# Patient Record
Sex: FEMALE | Race: WHITE | Employment: OTHER | URBAN - NONMETROPOLITAN AREA
[De-identification: names, ages, dates, MRNs, and addresses within clinical notes are randomized per-mention and may not be internally consistent; named-entity substitution may affect disease eponyms.]

---

## 2019-01-30 ENCOUNTER — OFFICE VISIT (OUTPATIENT)
Dept: CARDIOLOGY | Age: 74
End: 2019-01-30
Payer: MEDICARE

## 2019-01-30 VITALS
DIASTOLIC BLOOD PRESSURE: 64 MMHG | WEIGHT: 80 LBS | SYSTOLIC BLOOD PRESSURE: 132 MMHG | HEART RATE: 78 BPM | BODY MASS INDEX: 13.66 KG/M2 | HEIGHT: 64 IN

## 2019-01-30 DIAGNOSIS — R06.09 DOE (DYSPNEA ON EXERTION): ICD-10-CM

## 2019-01-30 DIAGNOSIS — R07.9 CHEST PAIN, UNSPECIFIED TYPE: Primary | ICD-10-CM

## 2019-01-30 DIAGNOSIS — R00.2 PALPITATIONS: ICD-10-CM

## 2019-01-30 DIAGNOSIS — I10 ESSENTIAL HYPERTENSION: ICD-10-CM

## 2019-01-30 DIAGNOSIS — Z98.890 HX OF MITRAL VALVE REPAIR: ICD-10-CM

## 2019-01-30 PROCEDURE — 3017F COLORECTAL CA SCREEN DOC REV: CPT | Performed by: NURSE PRACTITIONER

## 2019-01-30 PROCEDURE — 0296T PR EXT ECG > 48HR TO 21 DAY RCRD W/CONECT INTL RCRD: CPT | Performed by: NURSE PRACTITIONER

## 2019-01-30 PROCEDURE — 99214 OFFICE O/P EST MOD 30 MIN: CPT | Performed by: NURSE PRACTITIONER

## 2019-01-30 PROCEDURE — 4040F PNEUMOC VAC/ADMIN/RCVD: CPT | Performed by: NURSE PRACTITIONER

## 2019-01-30 PROCEDURE — G8484 FLU IMMUNIZE NO ADMIN: HCPCS | Performed by: NURSE PRACTITIONER

## 2019-01-30 PROCEDURE — G8427 DOCREV CUR MEDS BY ELIG CLIN: HCPCS | Performed by: NURSE PRACTITIONER

## 2019-01-30 PROCEDURE — G8400 PT W/DXA NO RESULTS DOC: HCPCS | Performed by: NURSE PRACTITIONER

## 2019-01-30 PROCEDURE — 1036F TOBACCO NON-USER: CPT | Performed by: NURSE PRACTITIONER

## 2019-01-30 PROCEDURE — 1090F PRES/ABSN URINE INCON ASSESS: CPT | Performed by: NURSE PRACTITIONER

## 2019-01-30 PROCEDURE — 1101F PT FALLS ASSESS-DOCD LE1/YR: CPT | Performed by: NURSE PRACTITIONER

## 2019-01-30 PROCEDURE — 1123F ACP DISCUSS/DSCN MKR DOCD: CPT | Performed by: NURSE PRACTITIONER

## 2019-01-30 PROCEDURE — G8419 CALC BMI OUT NRM PARAM NOF/U: HCPCS | Performed by: NURSE PRACTITIONER

## 2019-02-13 ENCOUNTER — HOSPITAL ENCOUNTER (OUTPATIENT)
Dept: NON INVASIVE DIAGNOSTICS | Age: 74
Discharge: HOME | End: 2019-02-13
Payer: MEDICARE

## 2019-02-13 VITALS
HEART RATE: 89 BPM | BODY MASS INDEX: 13.77 KG/M2 | WEIGHT: 80.25 LBS | SYSTOLIC BLOOD PRESSURE: 134 MMHG | DIASTOLIC BLOOD PRESSURE: 80 MMHG

## 2019-02-13 DIAGNOSIS — R06.09 DOE (DYSPNEA ON EXERTION): ICD-10-CM

## 2019-02-13 DIAGNOSIS — Z98.890 HX OF MITRAL VALVE REPAIR: ICD-10-CM

## 2019-02-13 DIAGNOSIS — R07.9 CHEST PAIN, UNSPECIFIED TYPE: ICD-10-CM

## 2019-02-13 LAB
LV EF: 60 %
LV EF: 60 %
LVEF MODALITY: NORMAL
LVEF MODALITY: NORMAL

## 2019-02-13 PROCEDURE — 6360000002 HC RX W HCPCS: Performed by: INTERNAL MEDICINE

## 2019-02-13 PROCEDURE — C8928 TTE W OR W/O FOL W/CON,STRES: HCPCS

## 2019-02-13 PROCEDURE — 93306 TTE W/DOPPLER COMPLETE: CPT

## 2019-02-13 PROCEDURE — 2580000003 HC RX 258: Performed by: INTERNAL MEDICINE

## 2019-02-13 PROCEDURE — 6360000004 HC RX CONTRAST MEDICATION: Performed by: INTERNAL MEDICINE

## 2019-02-13 RX ORDER — DOBUTAMINE HYDROCHLORIDE 200 MG/100ML
10 INJECTION INTRAVENOUS CONTINUOUS PRN
Status: ACTIVE | OUTPATIENT
Start: 2019-02-13 | End: 2019-02-14

## 2019-02-13 RX ORDER — SODIUM CHLORIDE 9 MG/ML
INJECTION, SOLUTION INTRAVENOUS
Status: COMPLETED | OUTPATIENT
Start: 2019-02-13 | End: 2019-02-13

## 2019-02-13 RX ADMIN — SODIUM CHLORIDE: 9 INJECTION, SOLUTION INTRAVENOUS at 10:00

## 2019-02-13 RX ADMIN — PERFLUTREN 1.65 MG: 6.52 INJECTION, SUSPENSION INTRAVENOUS at 10:00

## 2019-02-13 RX ADMIN — DOBUTAMINE HYDROCHLORIDE 10 MCG/KG/MIN: 200 INJECTION INTRAVENOUS at 10:00

## 2019-02-18 ENCOUNTER — TELEPHONE (OUTPATIENT)
Dept: CARDIOLOGY | Age: 74
End: 2019-02-18

## 2019-03-06 ENCOUNTER — OFFICE VISIT (OUTPATIENT)
Dept: CARDIOLOGY | Age: 74
End: 2019-03-06
Payer: MEDICARE

## 2019-03-06 VITALS
BODY MASS INDEX: 13.66 KG/M2 | HEIGHT: 64 IN | SYSTOLIC BLOOD PRESSURE: 162 MMHG | DIASTOLIC BLOOD PRESSURE: 82 MMHG | HEART RATE: 72 BPM | WEIGHT: 80 LBS

## 2019-03-06 DIAGNOSIS — I47.1 SVT (SUPRAVENTRICULAR TACHYCARDIA) (HCC): ICD-10-CM

## 2019-03-06 DIAGNOSIS — Z98.890 S/P MVR (MITRAL VALVE REPAIR): ICD-10-CM

## 2019-03-06 DIAGNOSIS — I34.1 MITRAL VALVE PROLAPSE: Primary | ICD-10-CM

## 2019-03-06 DIAGNOSIS — I10 ESSENTIAL HYPERTENSION: ICD-10-CM

## 2019-03-06 PROCEDURE — 4040F PNEUMOC VAC/ADMIN/RCVD: CPT | Performed by: NURSE PRACTITIONER

## 2019-03-06 PROCEDURE — 93000 ELECTROCARDIOGRAM COMPLETE: CPT | Performed by: NURSE PRACTITIONER

## 2019-03-06 PROCEDURE — G8427 DOCREV CUR MEDS BY ELIG CLIN: HCPCS | Performed by: NURSE PRACTITIONER

## 2019-03-06 PROCEDURE — 99213 OFFICE O/P EST LOW 20 MIN: CPT | Performed by: NURSE PRACTITIONER

## 2019-03-06 PROCEDURE — 1123F ACP DISCUSS/DSCN MKR DOCD: CPT | Performed by: NURSE PRACTITIONER

## 2019-03-06 PROCEDURE — 1101F PT FALLS ASSESS-DOCD LE1/YR: CPT | Performed by: NURSE PRACTITIONER

## 2019-03-06 PROCEDURE — 1090F PRES/ABSN URINE INCON ASSESS: CPT | Performed by: NURSE PRACTITIONER

## 2019-03-06 PROCEDURE — 1036F TOBACCO NON-USER: CPT | Performed by: NURSE PRACTITIONER

## 2019-03-06 PROCEDURE — G8484 FLU IMMUNIZE NO ADMIN: HCPCS | Performed by: NURSE PRACTITIONER

## 2019-03-06 PROCEDURE — G8419 CALC BMI OUT NRM PARAM NOF/U: HCPCS | Performed by: NURSE PRACTITIONER

## 2019-03-06 PROCEDURE — G8400 PT W/DXA NO RESULTS DOC: HCPCS | Performed by: NURSE PRACTITIONER

## 2019-03-06 PROCEDURE — 3017F COLORECTAL CA SCREEN DOC REV: CPT | Performed by: NURSE PRACTITIONER

## 2019-03-06 RX ORDER — LEVOTHYROXINE SODIUM 0.05 MG/1
50 TABLET ORAL DAILY
COMMUNITY
End: 2020-09-16

## 2019-03-26 SDOH — HEALTH STABILITY: MENTAL HEALTH: HOW OFTEN DO YOU HAVE A DRINK CONTAINING ALCOHOL?: NEVER

## 2019-03-29 ENCOUNTER — HOSPITAL ENCOUNTER (OUTPATIENT)
Age: 74
Setting detail: OUTPATIENT SURGERY
Discharge: HOME | End: 2019-03-29
Attending: ORTHOPAEDIC SURGERY | Admitting: ORTHOPAEDIC SURGERY
Payer: MEDICARE

## 2019-03-29 ENCOUNTER — ANESTHESIA (OUTPATIENT)
Dept: OPERATING ROOM | Age: 74
End: 2019-03-29
Payer: MEDICARE

## 2019-03-29 ENCOUNTER — ANESTHESIA EVENT (OUTPATIENT)
Dept: OPERATING ROOM | Age: 74
End: 2019-03-29
Payer: MEDICARE

## 2019-03-29 VITALS
RESPIRATION RATE: 23 BRPM | DIASTOLIC BLOOD PRESSURE: 51 MMHG | SYSTOLIC BLOOD PRESSURE: 121 MMHG | OXYGEN SATURATION: 100 % | TEMPERATURE: 98 F

## 2019-03-29 VITALS
TEMPERATURE: 97.1 F | HEART RATE: 58 BPM | SYSTOLIC BLOOD PRESSURE: 148 MMHG | WEIGHT: 80 LBS | BODY MASS INDEX: 13.66 KG/M2 | OXYGEN SATURATION: 96 % | DIASTOLIC BLOOD PRESSURE: 79 MMHG | HEIGHT: 64 IN | RESPIRATION RATE: 18 BRPM

## 2019-03-29 DIAGNOSIS — S63.439D TRAUMATIC RUPTURE OF VOLAR PLATE OF FINGER, SUBSEQUENT ENCOUNTER: Primary | ICD-10-CM

## 2019-03-29 PROBLEM — S63.439A: Status: ACTIVE | Noted: 2019-03-29

## 2019-03-29 PROCEDURE — 6360000002 HC RX W HCPCS: Performed by: NURSE ANESTHETIST, CERTIFIED REGISTERED

## 2019-03-29 PROCEDURE — 2580000003 HC RX 258: Performed by: NURSE ANESTHETIST, CERTIFIED REGISTERED

## 2019-03-29 PROCEDURE — 7100000010 HC PHASE II RECOVERY - FIRST 15 MIN: Performed by: ORTHOPAEDIC SURGERY

## 2019-03-29 PROCEDURE — 3700000000 HC ANESTHESIA ATTENDED CARE: Performed by: ORTHOPAEDIC SURGERY

## 2019-03-29 PROCEDURE — 7100000000 HC PACU RECOVERY - FIRST 15 MIN: Performed by: ORTHOPAEDIC SURGERY

## 2019-03-29 PROCEDURE — 6360000002 HC RX W HCPCS: Performed by: ORTHOPAEDIC SURGERY

## 2019-03-29 PROCEDURE — 2500000003 HC RX 250 WO HCPCS: Performed by: ORTHOPAEDIC SURGERY

## 2019-03-29 PROCEDURE — 7100000001 HC PACU RECOVERY - ADDTL 15 MIN: Performed by: ORTHOPAEDIC SURGERY

## 2019-03-29 PROCEDURE — 2500000003 HC RX 250 WO HCPCS: Performed by: NURSE ANESTHETIST, CERTIFIED REGISTERED

## 2019-03-29 PROCEDURE — 7100000011 HC PHASE II RECOVERY - ADDTL 15 MIN: Performed by: ORTHOPAEDIC SURGERY

## 2019-03-29 PROCEDURE — 6360000002 HC RX W HCPCS: Performed by: ANESTHESIOLOGY

## 2019-03-29 PROCEDURE — 2580000003 HC RX 258: Performed by: ANESTHESIOLOGY

## 2019-03-29 PROCEDURE — 3600000005 HC SURGERY LEVEL 5 BASE: Performed by: ORTHOPAEDIC SURGERY

## 2019-03-29 PROCEDURE — 2580000003 HC RX 258: Performed by: ORTHOPAEDIC SURGERY

## 2019-03-29 PROCEDURE — 2709999900 HC NON-CHARGEABLE SUPPLY: Performed by: ORTHOPAEDIC SURGERY

## 2019-03-29 PROCEDURE — 3700000001 HC ADD 15 MINUTES (ANESTHESIA): Performed by: ORTHOPAEDIC SURGERY

## 2019-03-29 PROCEDURE — 2780000010 HC IMPLANT OTHER: Performed by: ORTHOPAEDIC SURGERY

## 2019-03-29 PROCEDURE — 3600000015 HC SURGERY LEVEL 5 ADDTL 15MIN: Performed by: ORTHOPAEDIC SURGERY

## 2019-03-29 RX ORDER — APREPITANT 40 MG/1
40 CAPSULE ORAL ONCE
Status: COMPLETED | OUTPATIENT
Start: 2019-03-29 | End: 2019-03-29

## 2019-03-29 RX ORDER — SODIUM CHLORIDE, SODIUM LACTATE, POTASSIUM CHLORIDE, CALCIUM CHLORIDE 600; 310; 30; 20 MG/100ML; MG/100ML; MG/100ML; MG/100ML
INJECTION, SOLUTION INTRAVENOUS CONTINUOUS
Status: DISCONTINUED | OUTPATIENT
Start: 2019-03-29 | End: 2019-03-29 | Stop reason: HOSPADM

## 2019-03-29 RX ORDER — MORPHINE SULFATE 2 MG/ML
4 INJECTION, SOLUTION INTRAMUSCULAR; INTRAVENOUS EVERY 5 MIN PRN
Status: DISCONTINUED | OUTPATIENT
Start: 2019-03-29 | End: 2019-03-29 | Stop reason: HOSPADM

## 2019-03-29 RX ORDER — ONDANSETRON 2 MG/ML
INJECTION INTRAMUSCULAR; INTRAVENOUS PRN
Status: DISCONTINUED | OUTPATIENT
Start: 2019-03-29 | End: 2019-03-29 | Stop reason: SDUPTHER

## 2019-03-29 RX ORDER — DEXAMETHASONE SODIUM PHOSPHATE 10 MG/ML
INJECTION INTRAMUSCULAR; INTRAVENOUS PRN
Status: DISCONTINUED | OUTPATIENT
Start: 2019-03-29 | End: 2019-03-29 | Stop reason: SDUPTHER

## 2019-03-29 RX ORDER — MIDAZOLAM HYDROCHLORIDE 1 MG/ML
2 INJECTION INTRAMUSCULAR; INTRAVENOUS
Status: COMPLETED | OUTPATIENT
Start: 2019-03-29 | End: 2019-03-29

## 2019-03-29 RX ORDER — EPHEDRINE SULFATE 50 MG/ML
INJECTION, SOLUTION INTRAVENOUS PRN
Status: DISCONTINUED | OUTPATIENT
Start: 2019-03-29 | End: 2019-03-29 | Stop reason: SDUPTHER

## 2019-03-29 RX ORDER — PROPOFOL 10 MG/ML
INJECTION, EMULSION INTRAVENOUS PRN
Status: DISCONTINUED | OUTPATIENT
Start: 2019-03-29 | End: 2019-03-29 | Stop reason: SDUPTHER

## 2019-03-29 RX ORDER — LIDOCAINE HYDROCHLORIDE 10 MG/ML
1 INJECTION, SOLUTION EPIDURAL; INFILTRATION; INTRACAUDAL; PERINEURAL
Status: DISCONTINUED | OUTPATIENT
Start: 2019-03-29 | End: 2019-03-29 | Stop reason: HOSPADM

## 2019-03-29 RX ORDER — DIPHENHYDRAMINE HYDROCHLORIDE 50 MG/ML
12.5 INJECTION INTRAMUSCULAR; INTRAVENOUS
Status: DISCONTINUED | OUTPATIENT
Start: 2019-03-29 | End: 2019-03-29 | Stop reason: HOSPADM

## 2019-03-29 RX ORDER — MORPHINE SULFATE 2 MG/ML
2 INJECTION, SOLUTION INTRAMUSCULAR; INTRAVENOUS EVERY 5 MIN PRN
Status: DISCONTINUED | OUTPATIENT
Start: 2019-03-29 | End: 2019-03-29 | Stop reason: HOSPADM

## 2019-03-29 RX ORDER — HYDRALAZINE HYDROCHLORIDE 20 MG/ML
5 INJECTION INTRAMUSCULAR; INTRAVENOUS EVERY 10 MIN PRN
Status: DISCONTINUED | OUTPATIENT
Start: 2019-03-29 | End: 2019-03-29 | Stop reason: HOSPADM

## 2019-03-29 RX ORDER — BUPIVACAINE HYDROCHLORIDE 2.5 MG/ML
INJECTION, SOLUTION INFILTRATION; PERINEURAL PRN
Status: DISCONTINUED | OUTPATIENT
Start: 2019-03-29 | End: 2019-03-29 | Stop reason: ALTCHOICE

## 2019-03-29 RX ORDER — FENTANYL CITRATE 50 UG/ML
25 INJECTION, SOLUTION INTRAMUSCULAR; INTRAVENOUS
Status: DISCONTINUED | OUTPATIENT
Start: 2019-03-29 | End: 2019-03-29 | Stop reason: HOSPADM

## 2019-03-29 RX ORDER — HYDROCODONE BITARTRATE AND ACETAMINOPHEN 5; 325 MG/1; MG/1
1 TABLET ORAL PRN
Status: DISCONTINUED | OUTPATIENT
Start: 2019-03-29 | End: 2019-03-29 | Stop reason: HOSPADM

## 2019-03-29 RX ORDER — HYDROCODONE BITARTRATE AND ACETAMINOPHEN 10; 325 MG/1; MG/1
1 TABLET ORAL EVERY 4 HOURS PRN
Qty: 90 TABLET | Refills: 0 | Status: SHIPPED | OUTPATIENT
Start: 2019-03-29 | End: 2019-04-05

## 2019-03-29 RX ORDER — MEPERIDINE HYDROCHLORIDE 50 MG/ML
12.5 INJECTION INTRAMUSCULAR; INTRAVENOUS; SUBCUTANEOUS EVERY 5 MIN PRN
Status: DISCONTINUED | OUTPATIENT
Start: 2019-03-29 | End: 2019-03-29 | Stop reason: HOSPADM

## 2019-03-29 RX ORDER — SODIUM CHLORIDE, SODIUM LACTATE, POTASSIUM CHLORIDE, CALCIUM CHLORIDE 600; 310; 30; 20 MG/100ML; MG/100ML; MG/100ML; MG/100ML
INJECTION, SOLUTION INTRAVENOUS CONTINUOUS PRN
Status: DISCONTINUED | OUTPATIENT
Start: 2019-03-29 | End: 2019-03-29 | Stop reason: SDUPTHER

## 2019-03-29 RX ORDER — SODIUM CHLORIDE 0.9 % (FLUSH) 0.9 %
10 SYRINGE (ML) INJECTION EVERY 12 HOURS SCHEDULED
Status: DISCONTINUED | OUTPATIENT
Start: 2019-03-29 | End: 2019-03-29 | Stop reason: HOSPADM

## 2019-03-29 RX ORDER — LABETALOL HYDROCHLORIDE 5 MG/ML
5 INJECTION, SOLUTION INTRAVENOUS EVERY 10 MIN PRN
Status: DISCONTINUED | OUTPATIENT
Start: 2019-03-29 | End: 2019-03-29 | Stop reason: HOSPADM

## 2019-03-29 RX ORDER — FENTANYL CITRATE 50 UG/ML
50 INJECTION, SOLUTION INTRAMUSCULAR; INTRAVENOUS
Status: COMPLETED | OUTPATIENT
Start: 2019-03-29 | End: 2019-03-29

## 2019-03-29 RX ORDER — HYDROCODONE BITARTRATE AND ACETAMINOPHEN 5; 325 MG/1; MG/1
2 TABLET ORAL PRN
Status: DISCONTINUED | OUTPATIENT
Start: 2019-03-29 | End: 2019-03-29 | Stop reason: HOSPADM

## 2019-03-29 RX ORDER — LIDOCAINE HYDROCHLORIDE 10 MG/ML
INJECTION, SOLUTION INFILTRATION; PERINEURAL PRN
Status: DISCONTINUED | OUTPATIENT
Start: 2019-03-29 | End: 2019-03-29 | Stop reason: SDUPTHER

## 2019-03-29 RX ORDER — SODIUM CHLORIDE 0.9 % (FLUSH) 0.9 %
10 SYRINGE (ML) INJECTION PRN
Status: DISCONTINUED | OUTPATIENT
Start: 2019-03-29 | End: 2019-03-29 | Stop reason: HOSPADM

## 2019-03-29 RX ORDER — METOCLOPRAMIDE HYDROCHLORIDE 5 MG/ML
10 INJECTION INTRAMUSCULAR; INTRAVENOUS
Status: DISCONTINUED | OUTPATIENT
Start: 2019-03-29 | End: 2019-03-29 | Stop reason: HOSPADM

## 2019-03-29 RX ORDER — PROMETHAZINE HYDROCHLORIDE 25 MG/ML
6.25 INJECTION, SOLUTION INTRAMUSCULAR; INTRAVENOUS
Status: DISCONTINUED | OUTPATIENT
Start: 2019-03-29 | End: 2019-03-29 | Stop reason: HOSPADM

## 2019-03-29 RX ADMIN — SODIUM CHLORIDE, POTASSIUM CHLORIDE, SODIUM LACTATE AND CALCIUM CHLORIDE: 600; 310; 30; 20 INJECTION, SOLUTION INTRAVENOUS at 07:18

## 2019-03-29 RX ADMIN — APREPITANT 40 MG: 40 CAPSULE ORAL at 07:17

## 2019-03-29 RX ADMIN — DEXAMETHASONE SODIUM PHOSPHATE 10 MG: 10 INJECTION INTRAMUSCULAR; INTRAVENOUS at 08:15

## 2019-03-29 RX ADMIN — MIDAZOLAM 2 MG: 1 INJECTION INTRAMUSCULAR; INTRAVENOUS at 08:08

## 2019-03-29 RX ADMIN — FENTANYL CITRATE 25 MCG: 50 INJECTION INTRAMUSCULAR; INTRAVENOUS at 09:27

## 2019-03-29 RX ADMIN — ONDANSETRON HYDROCHLORIDE 4 MG: 2 INJECTION, SOLUTION INTRAMUSCULAR; INTRAVENOUS at 08:18

## 2019-03-29 RX ADMIN — FENTANYL CITRATE 25 MCG: 50 INJECTION INTRAMUSCULAR; INTRAVENOUS at 08:12

## 2019-03-29 RX ADMIN — LIDOCAINE HYDROCHLORIDE 50 MG: 10 INJECTION, SOLUTION INFILTRATION; PERINEURAL at 08:12

## 2019-03-29 RX ADMIN — SODIUM CHLORIDE, SODIUM LACTATE, POTASSIUM CHLORIDE, AND CALCIUM CHLORIDE: 600; 310; 30; 20 INJECTION, SOLUTION INTRAVENOUS at 08:38

## 2019-03-29 RX ADMIN — PROPOFOL 80 MG: 10 INJECTION, EMULSION INTRAVENOUS at 08:12

## 2019-03-29 RX ADMIN — FENTANYL CITRATE 25 MCG: 50 INJECTION INTRAMUSCULAR; INTRAVENOUS at 08:17

## 2019-03-29 RX ADMIN — SODIUM CHLORIDE, SODIUM LACTATE, POTASSIUM CHLORIDE, AND CALCIUM CHLORIDE: 600; 310; 30; 20 INJECTION, SOLUTION INTRAVENOUS at 08:08

## 2019-03-29 RX ADMIN — FENTANYL CITRATE 25 MCG: 50 INJECTION INTRAMUSCULAR; INTRAVENOUS at 08:37

## 2019-03-29 RX ADMIN — WATER 1 G: 1 INJECTION INTRAMUSCULAR; INTRAVENOUS; SUBCUTANEOUS at 08:21

## 2019-03-29 RX ADMIN — EPHEDRINE SULFATE 10 MG: 50 INJECTION, SOLUTION INTRAMUSCULAR; INTRAVENOUS; SUBCUTANEOUS at 08:32

## 2019-03-29 ASSESSMENT — ENCOUNTER SYMPTOMS: SHORTNESS OF BREATH: 0

## 2019-03-29 ASSESSMENT — PAIN - FUNCTIONAL ASSESSMENT: PAIN_FUNCTIONAL_ASSESSMENT: 0-10

## 2019-03-29 ASSESSMENT — LIFESTYLE VARIABLES: SMOKING_STATUS: 0

## 2019-04-19 ENCOUNTER — TELEPHONE (OUTPATIENT)
Dept: NEUROSURGERY | Age: 74
End: 2019-04-19

## 2019-04-19 NOTE — TELEPHONE ENCOUNTER
Neurosurgical pre apt questionnaire     Referring physician? Dr. Troy Trinidad    Reason for referral?      Leg weakness    Who is completing questionnaire? Patient     Has the patient had any previous spinal/brain surgeries? YES    If yes, where was the surgery preformed? [facility]     What was the surgery? Fusion to correct scoliosis     Who was the surgeon? Dr. Shima Palacios     When was this surgery? 1998      Is this a second opinion? NO    MRI images obtained? NO    If not, Is there any reason a MRI cannot be performed? NO         Is there metal anywhere in the body? Hardware from previous surgery. If yes,  where was the MRI preformed? CT was performed at [facility]    Note: If scan was performed at a facility other than [facility], the disk will need to be brought to appointment. Patient agreed to bring disk? YES     What part of the body? Lumbar spine     When was it preformed? 4/11/19    Physical Therapy? YES - it helped for awhile but     If yes, where was PT completed? [facility]     What is/was the duration of therapy? 21 sessions. Patient states it helped for a short time but as soon as she stopped the pain would start again. Pain Management? NO      Is the patient currently taking anything for pain control? NO     Employment Status ? Retired      Does the patient draw disability? NO    Symptoms? Bilateral leg weakness R>L. Patient states it is not a pain. She states her legs get tired very easily. She states that she has to take breaks often when ambulating.

## 2019-04-24 ENCOUNTER — TELEPHONE (OUTPATIENT)
Dept: NEUROSURGERY | Age: 74
End: 2019-04-24

## 2019-05-06 ENCOUNTER — OFFICE VISIT (OUTPATIENT)
Dept: NEUROSURGERY | Age: 74
End: 2019-05-06
Payer: MEDICARE

## 2019-05-06 VITALS
WEIGHT: 80.2 LBS | DIASTOLIC BLOOD PRESSURE: 75 MMHG | SYSTOLIC BLOOD PRESSURE: 171 MMHG | HEART RATE: 79 BPM | HEIGHT: 64 IN | BODY MASS INDEX: 13.69 KG/M2

## 2019-05-06 DIAGNOSIS — G89.29 CHRONIC MIDLINE LOW BACK PAIN WITHOUT SCIATICA: ICD-10-CM

## 2019-05-06 DIAGNOSIS — Z98.1 S/P LUMBAR FUSION: ICD-10-CM

## 2019-05-06 DIAGNOSIS — R29.898 WEAKNESS OF BOTH LOWER EXTREMITIES: Primary | ICD-10-CM

## 2019-05-06 DIAGNOSIS — M54.50 CHRONIC MIDLINE LOW BACK PAIN WITHOUT SCIATICA: ICD-10-CM

## 2019-05-06 DIAGNOSIS — M48.061 SPINAL STENOSIS OF LUMBAR REGION WITHOUT NEUROGENIC CLAUDICATION: ICD-10-CM

## 2019-05-06 DIAGNOSIS — M43.16 SPONDYLOLISTHESIS AT L4-L5 LEVEL: ICD-10-CM

## 2019-05-06 DIAGNOSIS — R29.898 WEAKNESS OF RIGHT LOWER EXTREMITY: ICD-10-CM

## 2019-05-06 PROCEDURE — 1123F ACP DISCUSS/DSCN MKR DOCD: CPT | Performed by: NURSE PRACTITIONER

## 2019-05-06 PROCEDURE — G8427 DOCREV CUR MEDS BY ELIG CLIN: HCPCS | Performed by: NURSE PRACTITIONER

## 2019-05-06 PROCEDURE — G8400 PT W/DXA NO RESULTS DOC: HCPCS | Performed by: NURSE PRACTITIONER

## 2019-05-06 PROCEDURE — 1036F TOBACCO NON-USER: CPT | Performed by: NURSE PRACTITIONER

## 2019-05-06 PROCEDURE — G8419 CALC BMI OUT NRM PARAM NOF/U: HCPCS | Performed by: NURSE PRACTITIONER

## 2019-05-06 PROCEDURE — 1090F PRES/ABSN URINE INCON ASSESS: CPT | Performed by: NURSE PRACTITIONER

## 2019-05-06 PROCEDURE — 99214 OFFICE O/P EST MOD 30 MIN: CPT | Performed by: NURSE PRACTITIONER

## 2019-05-06 PROCEDURE — 4040F PNEUMOC VAC/ADMIN/RCVD: CPT | Performed by: NURSE PRACTITIONER

## 2019-05-06 SDOH — HEALTH STABILITY: MENTAL HEALTH: HOW OFTEN DO YOU HAVE A DRINK CONTAINING ALCOHOL?: MONTHLY OR LESS

## 2019-05-06 ASSESSMENT — ENCOUNTER SYMPTOMS
COUGH: 1
GASTROINTESTINAL NEGATIVE: 1
BACK PAIN: 1
DOUBLE VISION: 1

## 2019-05-06 NOTE — PROGRESS NOTES
[facility] Neurosurgery  Office Visit      Chief Complaint   Patient presents with    New Patient     Ref. Dr Robyn Felty    Extremity Weakness     Bilat, worse on rt    Back Pain     LBP       HISTORY OF PRESENT ILLNESS:    Mattie Breaux is a 66 y.o. female with a history of previous scoliotic deformity correction per Dr. Jeff Guerra in [address] who presents today with low back pain and bilateral lower extremity weakness R>L. She reports that she has had chronic low back pain for many years, however, about 1 year ago she started to develop bilateral leg weakness R>L. She does state that about 5 years ago she was diagnosed with Lupus and being treated currently with steroids. The pain does not radiate. She only has mild back pain. The patient denies numbness. She states that it is \"very difficult\" to walk through [employer] or walking up steps. Going down steps is fine. She denies any recent falls. Prior to the scoliotic surgery she describes that she had major pain in her entire spine that she almost couldn't stand it any longer. After surgery she felt 100% better. The patient states that she can no longer walk up steps without weakness which has dramatically affected her quality of life. The patient has underwent a non-operative treatment course that has included:  Oral Steroids (for Lupus)  Physical Therapy with core strengthening (really helped but temporary)      Of note she does not use tobacco and does not take blood thinning medications. Of note she has a history of mitral valve repair and TIA.                  Past Medical History:   Diagnosis Date    Dizziness     H/O thyroidectomy 2015    2 non-malginant masses    History of mitral valve repair     Hypothyroidism     Migraine     Myasthenia gravis ([facility] [place] [address]     PONV (postoperative nausea and vomiting)     TIA (transient ischemic attack) 2006       Past Surgical History:   Procedure Laterality Date    BACK SURGERY      CARDIAC CATHETERIZATION  2006    MVP    CORONARY ARTERY BYPASS GRAFT  2006    HAND TENDON SURGERY Right 3/29/2019    MCP JOINT RIGHT HAND performed by Darshan Muro DO at [address] [place] [address]  2014    TONSILLECTOMY         Current Outpatient Medications   Medication Sig Dispense Refill    levothyroxine (SYNTHROID) 50 MCG tablet Take 50 mcg by mouth Daily       diltiazem (CARDIZEM) 30 MG tablet Take 1 tablet by mouth 2 times daily 120 tablet 3    predniSONE (DELTASONE) 10 MG tablet Take 10 mg by mouth daily       SUMAtriptan (IMITREX) 100 MG tablet Take 100 mg by mouth once as needed for Migraine.  multivitamin (THERAGRAN) per tablet Take 1 tablet by mouth daily. No current facility-administered medications for this visit. Allergies:  Lisinopril; Cellcept [mycophenolate]; Hydroxychloroquine sulfate; Maxalt [rizatriptan]; and Statins [statins]    Social History:   Social History     Tobacco Use   Smoking Status Former Smoker    Packs/day: 1.00    Years: 2.00    Pack years: 2.00    Types: Cigarettes   Smokeless Tobacco Never Used     Social History     Substance and Sexual Activity   Alcohol Use Not Currently    Frequency: Monthly or less         Family History:   Family History   Problem Relation Age of Onset    Diabetes Sister         passed away due to Diabetes    Heart Disease Brother     High Cholesterol Brother     High Blood Pressure Brother     High Blood Pressure Sister     High Blood Pressure Sister     High Blood Pressure Brother     High Cholesterol Brother     High Blood Pressure Brother     High Cholesterol Brother     Cancer Brother     High Blood Pressure Brother     High Cholesterol Brother     Cancer Brother     High Blood Pressure Brother     High Cholesterol Brother        REVIEW OF SYSTEMS:  Review of Systems   Constitutional: Positive for malaise/fatigue and weight loss. HENT: Negative. Eyes: Positive for double vision.    Respiratory: Positive for cough. Cardiovascular: Positive for palpitations. Gastrointestinal: Negative. Genitourinary: Negative. Musculoskeletal: Positive for back pain. Skin: Positive for rash. Neurological: Positive for weakness and headaches. Endo/Heme/Allergies: Positive for environmental allergies. Psychiatric/Behavioral: The patient has insomnia.             PHYSICAL EXAM:  Vitals:    05/06/19 1043   BP: (!) 171/75   Pulse: 79     Constitutional: appears well-developed and well-nourished. Eyes - conjunctiva normal.  Pupils react to light  Ear, nose, throat -hearing intact to finger rub, No scars, masses, or lesions over external nose or ears, no atrophy oftongue  Neck-symmetric, no masses noted, no jugular vein distension  Respiration- chest wall appears symmetric, good expansion, normal effort without use of accessory muscles  Musculoskeletal - no significantwasting of muscles noted, no bony deformities, gait no gross ataxia  Extremities-no clubbing, cyanosis oredema  Skin - warm, dry, and intact. No rash, erythema, or pallor. Psychiatric - mood, affect, and behavior appear normal.     Neurologic Examination  Awake, Alert and oriented x 4  Normal speech pattern, following commands    Motor:  RIGHT: hand grasp 5/5    finger extension 5/5    bicep 5/5    triceps 5/5    deltoid 5/5      iliopsoas 4-/5    knee flexor 4+/5    knee extension 4+/5    EHL/dorsiflexion 5/5    plantar flexion 5/5    LEFT:   hand grasp 5/5    finger extension 5/5    bicep 5/5    triceps 5/5    deltoid 5/5      iliopsoas 5/5    knee flexor 5/5    knee extension 5/5    EHL/dorsiflexion 5/5    plantar flexion 5/5    No deficits to light touch or pinprick sensation  Reflexes are 2+ and symmetric  No myofacial tenderness to palpation  Slight Antalgic Gait pattern        DATA and IMAGING:    Nursing/pcp notes, imaging, labs, and vitals reviewed.      PT,OT and/or speech notes reviewed    No results found for: WBC, HGB, HCT, MCV, PLTNo results found for: NA, K, CL, CO2, BUN, CREATININE, GLUCOSE, CALCIUM, PROT, LABALBU, BILITOT, ALKPHOS, AST, ALT, LABGLOM, GFRAA, AGRATIO, GLOBNo results found for: INR, PROTIME    CT Lumbar Spine (4/11/2019) Elijah Fuchs  I have personally reviewed the images and my interpretation is:  -There is evidence of an extensive scoliotic thoracolumbar construct. There is only the posterior portion of the construct visualized down to L4. There appears to be 2 screws at L4, 1 screw on the left of L3, and 1 screw on the right of L2  -There is also a spondylolisthesis of L4 on L5 that measures approximately 6-7mm    CT Chest 12/12/2016 appears to show hardware around T2 to L4       ASSESSMENT:    Annemarie Malhotra is a 66 y.o. female with a history of an extensive scoliotic deformity from approximately T2-L4 with complaints of bilateral lower extremity weakness. ICD-10-CM    1. Weakness of both lower extremities R29.898 MRI LUMBAR SPINE W WO CONTRAST   2. Weakness of right lower extremity R29.898 MRI LUMBAR SPINE W WO CONTRAST   3. Chronic midline low back pain without sciatica M54.5 MRI LUMBAR SPINE W WO CONTRAST    G89.29    4. Spondylolisthesis at L4-L5 level M43.16 MRI LUMBAR SPINE W WO CONTRAST   5. Spinal stenosis of lumbar region without neurogenic claudication M48.061 MRI LUMBAR SPINE W WO CONTRAST   6. S/P lumbar fusion Z98.1 XR LUMBAR SPINE FLEXION AND EXTENSION ONLY     MRI LUMBAR SPINE W WO CONTRAST       PLAN:  I have discussed and reviewed the results of the CT lumber spine with Ms. Tomasa Leon at length. I explained that she does have a spondylolisthesis at one level. I explained that I would like to obtain additional imaging to further grasp a diagnosis.   I am unsure if her bilateral lower extremity weakness is stemming from her lumbar spine given that she has no radicular pains.    -She does state that she has \"clips\" in her heart from a 2006 cardiac surgery at [facility] (may need to be cleared for MRI)  -Obtain XR lumbar flex/ex to rule out instability   -Obtain MRI lumbar spine   -Follow up after imaging     Note: A total of >50% (23 minutes) of 45 minutes was spent discussing the pathophysiology and treatment and/or coordination of care of the above diagnoses.       Missy Rios, APRN

## 2019-05-07 ENCOUNTER — TELEPHONE (OUTPATIENT)
Dept: NEUROSURGERY | Age: 74
End: 2019-05-07

## 2019-05-20 ENCOUNTER — HOSPITAL ENCOUNTER (OUTPATIENT)
Dept: MRI IMAGING | Age: 74
Discharge: HOME | End: 2019-05-20
Payer: MEDICARE

## 2019-05-20 ENCOUNTER — HOSPITAL ENCOUNTER (OUTPATIENT)
Dept: GENERAL RADIOLOGY | Age: 74
Discharge: HOME | End: 2019-05-20
Payer: MEDICARE

## 2019-05-20 DIAGNOSIS — M43.16 SPONDYLOLISTHESIS AT L4-L5 LEVEL: ICD-10-CM

## 2019-05-20 DIAGNOSIS — R29.898 WEAKNESS OF RIGHT LOWER EXTREMITY: ICD-10-CM

## 2019-05-20 DIAGNOSIS — M54.50 CHRONIC MIDLINE LOW BACK PAIN WITHOUT SCIATICA: ICD-10-CM

## 2019-05-20 DIAGNOSIS — R29.898 WEAKNESS OF BOTH LOWER EXTREMITIES: ICD-10-CM

## 2019-05-20 DIAGNOSIS — G89.29 CHRONIC MIDLINE LOW BACK PAIN WITHOUT SCIATICA: ICD-10-CM

## 2019-05-20 DIAGNOSIS — M48.061 SPINAL STENOSIS OF LUMBAR REGION WITHOUT NEUROGENIC CLAUDICATION: ICD-10-CM

## 2019-05-20 DIAGNOSIS — Z98.1 S/P LUMBAR FUSION: ICD-10-CM

## 2019-05-20 LAB
GFR NON-AFRICAN AMERICAN: >60
PERFORMED ON: NORMAL
POC CREATININE: 0.8 MG/DL (ref 0.3–1.3)
POC SAMPLE TYPE: NORMAL

## 2019-05-20 PROCEDURE — 72158 MRI LUMBAR SPINE W/O & W/DYE: CPT

## 2019-05-20 PROCEDURE — 82565 ASSAY OF CREATININE: CPT

## 2019-05-20 PROCEDURE — A9577 INJ MULTIHANCE: HCPCS | Performed by: NURSE PRACTITIONER

## 2019-05-20 PROCEDURE — 72120 X-RAY BEND ONLY L-S SPINE: CPT

## 2019-05-20 PROCEDURE — 6360000004 HC RX CONTRAST MEDICATION: Performed by: NURSE PRACTITIONER

## 2019-05-20 RX ADMIN — GADOBENATE DIMEGLUMINE 7 ML: 529 INJECTION, SOLUTION INTRAVENOUS at 10:26

## 2019-05-28 ENCOUNTER — OFFICE VISIT (OUTPATIENT)
Dept: NEUROSURGERY | Age: 74
End: 2019-05-28
Payer: MEDICARE

## 2019-05-28 VITALS
SYSTOLIC BLOOD PRESSURE: 160 MMHG | HEIGHT: 64 IN | DIASTOLIC BLOOD PRESSURE: 73 MMHG | HEART RATE: 67 BPM | WEIGHT: 81 LBS | BODY MASS INDEX: 13.83 KG/M2 | OXYGEN SATURATION: 100 %

## 2019-05-28 DIAGNOSIS — Z98.1 S/P LUMBAR FUSION: ICD-10-CM

## 2019-05-28 DIAGNOSIS — M48.061 LUMBAR FORAMINAL STENOSIS: ICD-10-CM

## 2019-05-28 DIAGNOSIS — R29.898 WEAKNESS OF BOTH LOWER EXTREMITIES: ICD-10-CM

## 2019-05-28 DIAGNOSIS — M51.37 DDD (DEGENERATIVE DISC DISEASE), LUMBOSACRAL: ICD-10-CM

## 2019-05-28 DIAGNOSIS — M54.6 CHRONIC MIDLINE THORACIC BACK PAIN: ICD-10-CM

## 2019-05-28 DIAGNOSIS — G89.29 CHRONIC MIDLINE THORACIC BACK PAIN: ICD-10-CM

## 2019-05-28 DIAGNOSIS — M43.16 SPONDYLOLISTHESIS AT L4-L5 LEVEL: Primary | ICD-10-CM

## 2019-05-28 PROCEDURE — 1123F ACP DISCUSS/DSCN MKR DOCD: CPT | Performed by: NURSE PRACTITIONER

## 2019-05-28 PROCEDURE — 1036F TOBACCO NON-USER: CPT | Performed by: NURSE PRACTITIONER

## 2019-05-28 PROCEDURE — 1090F PRES/ABSN URINE INCON ASSESS: CPT | Performed by: NURSE PRACTITIONER

## 2019-05-28 PROCEDURE — G8427 DOCREV CUR MEDS BY ELIG CLIN: HCPCS | Performed by: NURSE PRACTITIONER

## 2019-05-28 PROCEDURE — G8400 PT W/DXA NO RESULTS DOC: HCPCS | Performed by: NURSE PRACTITIONER

## 2019-05-28 PROCEDURE — G8419 CALC BMI OUT NRM PARAM NOF/U: HCPCS | Performed by: NURSE PRACTITIONER

## 2019-05-28 PROCEDURE — 4040F PNEUMOC VAC/ADMIN/RCVD: CPT | Performed by: NURSE PRACTITIONER

## 2019-05-28 PROCEDURE — 99213 OFFICE O/P EST LOW 20 MIN: CPT | Performed by: NURSE PRACTITIONER

## 2019-05-28 RX ORDER — BRIMONIDINE TARTRATE 2 MG/ML
1 SOLUTION/ DROPS OPHTHALMIC DAILY
COMMUNITY
End: 2021-05-11 | Stop reason: ALTCHOICE

## 2019-05-28 RX ORDER — DORZOLAMIDE HCL 20 MG/ML
1 SOLUTION/ DROPS OPHTHALMIC DAILY
COMMUNITY
End: 2019-09-11

## 2019-05-28 NOTE — PROGRESS NOTES
[facility]  Office Visit      Chief Complaint   Patient presents with    Follow-up     MRI/XR lumber review     5/28/2019: Ms. Stefany Ariza returns to clinic today to review the MRI lumbar spine. She continues to state that she has upper leg weakness. She states \"I tire easily\". She states when walking she just becomes tired, she cannot climb steps, however, she can descend. Her back pain is mainly in her thoracic spine. She states that she has lost weight over the past year and has gone from 113 - 81lbs. She denies pain anywhere else. HISTORY OF PRESENT ILLNESS:    Jahaira Khoury is a 66 y.o. female with a history of previous scoliotic deformity correction per Dr. Kevin Vega in [address] who presents today with low back pain and bilateral lower extremity weakness R>L. She reports that she has had chronic low back pain for many years, however, about 1 year ago she started to develop bilateral leg weakness R>L. She does state that about 5 years ago she was diagnosed with Lupus and being treated currently with steroids. The pain does not radiate. She only has mild back pain. The patient denies numbness. She states that it is \"very difficult\" to walk through [employer] or walking up steps. Going down steps is fine. She denies any recent falls. Prior to the scoliotic surgery she describes that she had major pain in her entire spine that she almost couldn't stand it any longer. After surgery she felt 100% better. The patient states that she can no longer walk up steps without weakness which has dramatically affected her quality of life. The patient has underwent a non-operative treatment course that has included:  Oral Steroids (for Lupus)  Physical Therapy with core strengthening (really helped but temporary)      Of note she does not use tobacco and does not take blood thinning medications. Of note she has a history of mitral valve repair and TIA.                  Past Medical History: Diagnosis Date    Dizziness     H/O thyroidectomy 2015    2 non-malginant masses    History of mitral valve repair     Hypothyroidism     Migraine     [facility]ast[facility]ia gravis ([facility] [address]     PONV (postoperative nausea and vomiting)     TIA (transient ischemic attack) 2006       Past Surgical History:   Procedure Laterality Date    BACK SURGERY      CARDIAC CATHETERIZATION  2006    MVP    CORONARY ARTERY BYPASS GRAFT  2006    HAND TENDON SURGERY Right 3/29/2019    MCP JOINT RIGHT HAND performed by Rita العراقي DO at [address] [place] [address]  2014    TONSILLECTOMY         Current Outpatient Medications   Medication Sig Dispense Refill    brimonidine (ALPHAGAN) 0.2 % ophthalmic solution Place 1 drop into both eyes daily      dorzolamide (TRUSOPT) 2 % ophthalmic solution Place 1 drop into both eyes daily      levothyroxine (SYNTHROID) 50 MCG tablet Take 50 mcg by mouth Daily       diltiazem (CARDIZEM) 30 MG tablet Take 1 tablet by mouth 2 times daily 120 tablet 3    predniSONE (DELTASONE) 10 MG tablet Take 10 mg by mouth daily       SUMAtriptan (IMITREX) 100 MG tablet Take 100 mg by mouth once as needed for Migraine.  multivitamin (THERAGRAN) per tablet Take 1 tablet by mouth daily. No current facility-administered medications for this visit. Allergies:  Lisinopril; Cellcept [mycophenolate];  Hydroxychloroquine sulfate; Maxalt [rizatriptan]; and Statins [statins]    Social History:   Social History     Tobacco Use   Smoking Status Former Smoker    Packs/day: 1.00    Years: 2.00    Pack years: 2.00    Types: Cigarettes   Smokeless Tobacco Never Used     Social History     Substance and Sexual Activity   Alcohol Use Not Currently    Frequency: Monthly or less         Family History:   Family History   Problem Relation Age of Onset    Diabetes Sister         passed away due to Diabetes    Heart Disease Brother     High Cholesterol Brother     High Blood Pressure Brother     High acute fracture. Cord: The conus medullaris terminates at the level of T12. Only the   inferior most aspect of the spinal cord is identified, with this   distal most segment appearing unremarkable. Soft tissues: The surrounding soft tissues are unremarkable. Levels:    L1-L2: No disc herniation or significant disc bulge. No significant   spinal stenosis. Facet arthropathy results in moderate right-sided   neuroforaminal narrowing. No significant foraminal narrowing on the   left. L2-L3: No disc herniation or significant disc bulge. No significant   spinal stenosis. Facet arthropathy results in a moderate right-sided   neuroforaminal narrowing. No significant foraminal narrowing on the   left. L3-L4: No disc herniation or significant disc bulge. No significant   spinal stenosis. No significant neuroforaminal narrowing. L4-L5: There is loss of intervertebral disc height with diffuse disc   bulging. Marked facet hypertrophy with mild ligamentum flavum   thickening. Changes result in a severe spinal stenosis. There is a   bilateral moderate to severe neuroforaminal narrowing. L5-S1: Minimal diffuse disc bulging. Bilateral facet arthropathy with   mild ligamentum flavum thickening. This does not result in a   significant spinal stenosis. Facet arthropathy results in a moderate   bilateral neuroforaminal narrowing.        Impression   1. Long segment posterior spinal fusion extending from the thoracic   spine down to the L4 level. Degenerative anterior listhesis below the   fusion at L4-5, with disc bulging and facet hypertrophy resulting in a   severe spinal stenosis at this L4-5 level. 2. Bilateral moderate-to-severe neuroforaminal narrowing at L4-5.   3. No visualized fracture, abnormal marrow infiltrate or pathologic   enhancement. Signed by Dr Edgar Quiñones on 5/20/2019 10:48 AM   I have personally reviewed the images and my interpretation is:   There is evidence of previous instrumented fusion that extends to L4  L4-5 there is a spondylolisthesis that measures 5-6mm, mild canal stenosis and mild bilateral foraminal stenosis        Narrative   XR LUMBAR SPINE FLEXION AND EXTENSION ONLY 5/20/2019 9:21 AM   HISTORY: Z98.1   Comparison: MRI dated 5/20/2019    Findings:    Lateral neutral and flexion-extension radiographs of the lumbar spine   were obtained. There is a long segment posterior spinal fusion construct which   extends from the thoracic spine down to the L4 level. The construct   appears to be intact. In the neutral position there is approximately   5.5 mm anterior listhesis at L4-5. With extension this measures   approximately 3.5 mm. With flexion this measures up to 9 mm. Vertebral body heights are maintained. No acute fracture appreciated. Advanced facet arthropathy at L4-5 and L5-S1. The bones are   osteopenic.       Impression   Impression:    1. Degenerative anterolisthesis at L4-5 with evidence for dynamic   instability. Signed by Dr Bronson Guadalupe on 5/20/2019 11:01 AM   I have personally reviewed the images and my interpretation is:  Upon flexion and extension the spondylolisthesis at L4-5 changes from 4mm to 9mm   She does have some radiographic evidence of osteoporosis         ASSESSMENT:    Chato Jaramillo is a 66 y.o. female with a history of an extensive scoliotic deformity from approximately T2-L4 with complaints of bilateral lower extremity weakness. ICD-10-CM    1. Spondylolisthesis at L4-L5 level M43.16    2. Lumbar foraminal stenosis M99.83    3. DDD (degenerative disc disease), lumbosacral M51.37    4. Chronic midline thoracic back pain M54.6     G89.29    5. Weakness of both lower extremities R29.898    6. S/P lumbar fusion Z98.1        PLAN:  -We have discussed and reviewed the results of the MRI lumbar spine review with Ms. Stacey Vidal at length.   We explained that she does have a spondylolisthesis at the level below her construct and per the XR she does have some instability noted. We also explained that her leg weakness is likely not related to the spine from what we can see per the most recent imaging. Given her extensive history of scoliosis and surgical history we could offer to send her to see Dr. Modesto Jones for his evaluation.   She would like to hold off at this time and would like us to follow her.    -Follow up in 3 months         JEREMY Rowell

## 2019-09-03 ENCOUNTER — OFFICE VISIT (OUTPATIENT)
Dept: NEUROSURGERY | Age: 74
End: 2019-09-03
Payer: MEDICARE

## 2019-09-03 VITALS
OXYGEN SATURATION: 95 % | BODY MASS INDEX: 13.15 KG/M2 | SYSTOLIC BLOOD PRESSURE: 156 MMHG | HEART RATE: 89 BPM | DIASTOLIC BLOOD PRESSURE: 75 MMHG | HEIGHT: 64 IN | WEIGHT: 77 LBS

## 2019-09-03 DIAGNOSIS — M51.37 DDD (DEGENERATIVE DISC DISEASE), LUMBOSACRAL: ICD-10-CM

## 2019-09-03 DIAGNOSIS — G89.29 CHRONIC MIDLINE THORACIC BACK PAIN: ICD-10-CM

## 2019-09-03 DIAGNOSIS — Z98.1 S/P LUMBAR FUSION: ICD-10-CM

## 2019-09-03 DIAGNOSIS — M54.6 CHRONIC MIDLINE THORACIC BACK PAIN: ICD-10-CM

## 2019-09-03 DIAGNOSIS — M43.16 SPONDYLOLISTHESIS AT L4-L5 LEVEL: Primary | ICD-10-CM

## 2019-09-03 DIAGNOSIS — M48.061 LUMBAR FORAMINAL STENOSIS: ICD-10-CM

## 2019-09-03 DIAGNOSIS — R29.898 WEAKNESS OF BOTH LOWER EXTREMITIES: ICD-10-CM

## 2019-09-03 PROCEDURE — 1090F PRES/ABSN URINE INCON ASSESS: CPT | Performed by: NURSE PRACTITIONER

## 2019-09-03 PROCEDURE — 1036F TOBACCO NON-USER: CPT | Performed by: NURSE PRACTITIONER

## 2019-09-03 PROCEDURE — G8400 PT W/DXA NO RESULTS DOC: HCPCS | Performed by: NURSE PRACTITIONER

## 2019-09-03 PROCEDURE — 99213 OFFICE O/P EST LOW 20 MIN: CPT | Performed by: NURSE PRACTITIONER

## 2019-09-03 PROCEDURE — G8419 CALC BMI OUT NRM PARAM NOF/U: HCPCS | Performed by: NURSE PRACTITIONER

## 2019-09-03 PROCEDURE — 1123F ACP DISCUSS/DSCN MKR DOCD: CPT | Performed by: NURSE PRACTITIONER

## 2019-09-03 PROCEDURE — 4040F PNEUMOC VAC/ADMIN/RCVD: CPT | Performed by: NURSE PRACTITIONER

## 2019-09-03 PROCEDURE — G8427 DOCREV CUR MEDS BY ELIG CLIN: HCPCS | Performed by: NURSE PRACTITIONER

## 2019-09-03 NOTE — PROGRESS NOTES
[facility]  Office Visit      Chief Complaint   Patient presents with    Follow-up     Reevaluate back pain     9/03/2019: Ms. Waggoner returns to clinic today re-evaluate her back pain. She states that her pain is about the same. She continues to state that her bilateral upper thighs are very weak. She states that she has lost about 2 more pounds. 5/28/2019: Ms. Waggoner returns to clinic today to review the MRI lumbar spine. She continues to state that she has upper leg weakness. She states \"I tire easily\". She states when walking she just becomes tired, she cannot climb steps, however, she can descend. Her back pain is mainly in her thoracic spine. She states that she has lost weight over the past year and has gone from 113 - 81lbs. She denies pain anywhere else. HISTORY OF PRESENT ILLNESS:    Neville Vivar is a 66 y.o. female with a history of previous scoliotic deformity correction per Dr. Elías Vu in [address] [place] [address] who presents today with low back pain and bilateral lower extremity weakness R>L. She reports that she has had chronic low back pain for many years, however, about 1 year ago she started to develop bilateral leg weakness R>L. She does state that about 5 years ago she was diagnosed with Lupus and being treated currently with steroids. The pain does not radiate. She only has mild back pain. The patient denies numbness. She states that it is \"very difficult\" to walk through [employer] or walking up steps. Going down steps is fine. She denies any recent falls. Prior to the scoliotic surgery she describes that she had major pain in her entire spine that she almost couldn't stand it any longer. After surgery she felt 100% better. The patient states that she can no longer walk up steps without weakness which has dramatically affected her quality of life.      The patient has underwent a non-operative treatment course that has included:  Oral Steroids (for Lupus)  Physical

## 2019-09-05 ENCOUNTER — TELEPHONE (OUTPATIENT)
Dept: CARDIOLOGY | Age: 74
End: 2019-09-05

## 2019-09-11 ENCOUNTER — OFFICE VISIT (OUTPATIENT)
Dept: CARDIOLOGY | Age: 74
End: 2019-09-11
Payer: MEDICARE

## 2019-09-11 VITALS
WEIGHT: 79 LBS | HEIGHT: 64 IN | DIASTOLIC BLOOD PRESSURE: 58 MMHG | HEART RATE: 70 BPM | SYSTOLIC BLOOD PRESSURE: 110 MMHG | BODY MASS INDEX: 13.49 KG/M2

## 2019-09-11 DIAGNOSIS — I10 ESSENTIAL HYPERTENSION: Primary | ICD-10-CM

## 2019-09-11 PROCEDURE — 4040F PNEUMOC VAC/ADMIN/RCVD: CPT | Performed by: INTERNAL MEDICINE

## 2019-09-11 PROCEDURE — 1090F PRES/ABSN URINE INCON ASSESS: CPT | Performed by: INTERNAL MEDICINE

## 2019-09-11 PROCEDURE — 99213 OFFICE O/P EST LOW 20 MIN: CPT | Performed by: INTERNAL MEDICINE

## 2019-09-11 PROCEDURE — 1036F TOBACCO NON-USER: CPT | Performed by: INTERNAL MEDICINE

## 2019-09-11 PROCEDURE — 93000 ELECTROCARDIOGRAM COMPLETE: CPT | Performed by: INTERNAL MEDICINE

## 2019-09-11 PROCEDURE — G8400 PT W/DXA NO RESULTS DOC: HCPCS | Performed by: INTERNAL MEDICINE

## 2019-09-11 PROCEDURE — G8427 DOCREV CUR MEDS BY ELIG CLIN: HCPCS | Performed by: INTERNAL MEDICINE

## 2019-09-11 PROCEDURE — 1123F ACP DISCUSS/DSCN MKR DOCD: CPT | Performed by: INTERNAL MEDICINE

## 2019-09-11 PROCEDURE — G8419 CALC BMI OUT NRM PARAM NOF/U: HCPCS | Performed by: INTERNAL MEDICINE

## 2019-09-11 RX ORDER — PREDNISONE 10 MG/1
TABLET ORAL
Status: ON HOLD | COMMUNITY
End: 2022-01-12 | Stop reason: HOSPADM

## 2020-03-17 ENCOUNTER — OFFICE VISIT (OUTPATIENT)
Dept: CARDIOLOGY | Age: 75
End: 2020-03-17
Payer: MEDICARE

## 2020-03-17 VITALS
WEIGHT: 82 LBS | HEART RATE: 78 BPM | SYSTOLIC BLOOD PRESSURE: 122 MMHG | DIASTOLIC BLOOD PRESSURE: 82 MMHG | HEIGHT: 64 IN | BODY MASS INDEX: 14 KG/M2

## 2020-03-17 PROCEDURE — G8427 DOCREV CUR MEDS BY ELIG CLIN: HCPCS | Performed by: NURSE PRACTITIONER

## 2020-03-17 PROCEDURE — G8419 CALC BMI OUT NRM PARAM NOF/U: HCPCS | Performed by: NURSE PRACTITIONER

## 2020-03-17 PROCEDURE — 1090F PRES/ABSN URINE INCON ASSESS: CPT | Performed by: NURSE PRACTITIONER

## 2020-03-17 PROCEDURE — 4040F PNEUMOC VAC/ADMIN/RCVD: CPT | Performed by: NURSE PRACTITIONER

## 2020-03-17 PROCEDURE — G8484 FLU IMMUNIZE NO ADMIN: HCPCS | Performed by: NURSE PRACTITIONER

## 2020-03-17 PROCEDURE — 99213 OFFICE O/P EST LOW 20 MIN: CPT | Performed by: NURSE PRACTITIONER

## 2020-03-17 PROCEDURE — 1123F ACP DISCUSS/DSCN MKR DOCD: CPT | Performed by: NURSE PRACTITIONER

## 2020-03-17 PROCEDURE — 1036F TOBACCO NON-USER: CPT | Performed by: NURSE PRACTITIONER

## 2020-03-17 PROCEDURE — G8400 PT W/DXA NO RESULTS DOC: HCPCS | Performed by: NURSE PRACTITIONER

## 2020-03-17 RX ORDER — POTASSIUM CHLORIDE 750 MG/1
10 TABLET, FILM COATED, EXTENDED RELEASE ORAL DAILY
COMMUNITY
End: 2020-09-16

## 2020-03-17 NOTE — PATIENT INSTRUCTIONS
treated, there are risks, including:   · Heart disease. · Stroke. · Kidney failure. · See your doctor as told. GET HELP RIGHT AWAY IF:  · You get a very bad headache. · You get blurred or changing vision. · You feel confused. · You feel weak, numb, or faint. · You get chest or belly (abdominal) pain. · You throw up (vomit). · You cannot breathe very well. If you have a blood pressure reading with a top number of 180 or higher, you need to see your doctor right away. This is especially true if you are having any of the problems listed above. Patient Education        Supraventricular Tachycardia: Care Instructions  Your Care Instructions    Having supraventricular tachycardia (SVT) means that from time to time your heart beats abnormally fast. This fast rhythm is caused by changes in the electrical system of your heart. You may feel a fluttering in your chest (palpitations) and have a fast pulse. When your heart is beating fast, you may feel anxious and lightheaded, be short of breath, and feel discomfort in the chest.  Your doctor may prescribe medicines to help slow down your heartbeat. Your doctor may also suggest you try vagal maneuvers when having an episode of SVT. These are things, like bearing down, that might help slow your heart rate. Bearing down means that you try to breathe out with your stomach muscles but you don't let air out of your nose or mouth. Your doctor can show you how to do vagal maneuvers. He or she may suggest you lie down on your back to do them. In some cases, either cardioversion treatment or a procedure called catheter ablation is done to correct SVT. Your doctor may ask you to wear a small electronic device for 1 or 2 days to monitor your heart. It is called a Holter monitor. Follow-up care is a key part of your treatment and safety. Be sure to make and go to all appointments, and call your doctor if you are having problems.  It's also a good idea to know anytime you think you may need emergency care. For example, call if:    · You passed out (lost consciousness).     · You are short of breath.    Call your doctor now or seek immediate medical care if:    · You have a fast heartbeat.     · You are dizzy or lightheaded, or feel like you may faint.    Watch closely for changes in your health, and be sure to contact your doctor if:    · You do not get better as expected. Where can you learn more? Go to https://Antavo.Equities.com. org and sign in to your ticketea account. Enter G244 in the [employer] box to learn more about \"Supraventricular Tachycardia: Care Instructions. \"     If you do not have an account, please click on the \"Sign Up Now\" link. Current as of: December 15, 2019Content Version: 12.4  © 2306-6244 Healthwise, Incorporated. Care instructions adapted under license by [facility] [place] ([facility]). If you have questions about a medical condition or this instruction, always ask your healthcare professional. [place] [address] any warranty or liability for your use of this information. [place] at the [facility] and [address] [facility] located on the first floor of [place] [address] through hospital main entrance and turn immediately to your left. Date/Time:     Patient's contact number:  [phone] (home)     Echocardiogram -  No prep. Takes approximately 30 min. An echocardiogram uses sound waves to produce images of your heart. This commonly used test allows your doctor to see how your heart is beating and pumping blood. Your doctor can use the images from an echocardiogram to identify various abnormalities in the heart muscle and valves. This test has 2 parts:   Ø You will be asked to disrobe from the waist up and given a gown to wear.  The technologist will then hook up an EKG monitor to you for the entire exam.   Ø You will then have an ultrasound of your heart (echocardiogram) to assess

## 2020-03-17 NOTE — PROGRESS NOTES
Dear Dr. Teo Nicholas MD,    Thank you for allowing me to participate in the care of Ms. Aron Gold. She presents today at the [address] [place] [address] in the [facility]. As you know, Ms. Ana Ceron is a 66 y.o. female with history of hypertension, hypothyroidism, migraines, myasthenia gravis, TIA,and s/p Mitral valve repair (2006) who presents with the chief complaint of follow-up for chronic cardiac conditions. She is a patient of Dr. Quincy Santos. Since last seen, she has been stable from a cardiac standpoint. She denies any exertional chest pain. She has noticed for the past 3 months that if she walks too far she does become short of breath. She denies any sustained fast or slow rhythms. She denies any syncopal spells. she is sleeping on 1 pillow at night. she is not waking gasping for air. she denies any swelling. she has been compliant with her medications. her BP has been controlled. PCP follows labs and lipids. She otherwise denies chest pain, PND, orthopnea, syncope or near syncope. She has no other complaints. Review of Systems   Constitutional: Negative for fever, chills, diaphoresis, activity change, appetite change, fatigue and unexpected weight change. Eyes: Negative for photophobia, pain, redness and visual disturbance. Respiratory: Positive for shortness of breath. Negative for apnea, cough, chest tightness,  wheezing and stridor. Cardiovascular: Negative for chest pain, palpitations, and leg swelling. Gastrointestinal: Negative for abdominal distention. Genitourinary: Negative for dysuria, urgency and frequency. Musculoskeletal: Negative for myalgias, arthralgias and gait problem. Skin: Negative for color change, pallor, rash and wound. Neurological: Negative for dizziness, tremors, speech difficulty, weakness and numbness. Hematological: Does not bruise/bleed easily. Psychiatric/Behavioral: Negative.         Past Medical History: activity     Days per week: Not on file     Minutes per session: Not on file    Stress: Not on file   Relationships    Social connections     Talks on phone: Not on file     Gets together: Not on file     Attends [employer] witness service: Not on file     Active member of club or organization: Not on file     Attends meetings of clubs or organizations: Not on file     Relationship status: Not on file    Intimate partner violence     Fear of current or ex partner: Not on file     Emotionally abused: Not on file     Physically abused: Not on file     Forced sexual activity: Not on file   Other Topics Concern    Not on file   Social History Narrative    Not on file       Allergies   Allergen Reactions    Lisinopril Anaphylaxis     Tongue swelling    Cellcept [Mycophenolate]     Hydroxychloroquine Sulfate     Maxalt [Rizatriptan] Hives    Statins [Statins]          Current Outpatient Medications:     diltiazem (CARDIZEM) 30 MG tablet, TAKE ONE TABLET TWICE DAILY, Disp: 120 tablet, Rfl: 3    predniSONE (DELTASONE) 10 MG tablet, Take 10 mg every other day; 15 mg days in between, Disp: , Rfl:     brimonidine (ALPHAGAN) 0.2 % ophthalmic solution, Place 1 drop into the right eye daily , Disp: , Rfl:     levothyroxine (SYNTHROID) 50 MCG tablet, Take 50 mcg by mouth Daily , Disp: , Rfl:     SUMAtriptan (IMITREX) 100 MG tablet, Take 100 mg by mouth once as needed for Migraine. , Disp: , Rfl:     multivitamin (THERAGRAN) per tablet, Take 1 tablet by mouth daily. , Disp: , Rfl:     PE:  Vitals:    03/17/20 0955   BP: 122/82   Pulse: 78       Estimated body mass index is 14.08 kg/m² as calculated from the following:    Height as of this encounter: 5' 4\" (1.626 m). Weight as of this encounter: 82 lb (37.2 kg). Constitutional: She is oriented to person, place, and time. She appears well-developed and well-nourished in no acute distress. Head: Normocephalic and atraumatic. Neck:  Neck supple without JVD present.

## 2020-06-10 ENCOUNTER — HOSPITAL ENCOUNTER (OUTPATIENT)
Dept: NON INVASIVE DIAGNOSTICS | Age: 75
Discharge: HOME | End: 2020-06-10
Payer: MEDICARE

## 2020-06-10 LAB
LV EF: 66 %
LVEF MODALITY: NORMAL

## 2020-06-10 PROCEDURE — 93306 TTE W/DOPPLER COMPLETE: CPT

## 2020-09-16 ENCOUNTER — TELEPHONE (OUTPATIENT)
Dept: CARDIOLOGY | Age: 75
End: 2020-09-16

## 2020-09-16 ENCOUNTER — OFFICE VISIT (OUTPATIENT)
Dept: CARDIOLOGY | Age: 75
End: 2020-09-16
Payer: MEDICARE

## 2020-09-16 VITALS
WEIGHT: 80 LBS | HEIGHT: 64 IN | SYSTOLIC BLOOD PRESSURE: 152 MMHG | BODY MASS INDEX: 13.66 KG/M2 | DIASTOLIC BLOOD PRESSURE: 80 MMHG

## 2020-09-16 PROCEDURE — 1036F TOBACCO NON-USER: CPT | Performed by: INTERNAL MEDICINE

## 2020-09-16 PROCEDURE — G8400 PT W/DXA NO RESULTS DOC: HCPCS | Performed by: INTERNAL MEDICINE

## 2020-09-16 PROCEDURE — 1090F PRES/ABSN URINE INCON ASSESS: CPT | Performed by: INTERNAL MEDICINE

## 2020-09-16 PROCEDURE — G8427 DOCREV CUR MEDS BY ELIG CLIN: HCPCS | Performed by: INTERNAL MEDICINE

## 2020-09-16 PROCEDURE — 4040F PNEUMOC VAC/ADMIN/RCVD: CPT | Performed by: INTERNAL MEDICINE

## 2020-09-16 PROCEDURE — 99213 OFFICE O/P EST LOW 20 MIN: CPT | Performed by: INTERNAL MEDICINE

## 2020-09-16 PROCEDURE — 1123F ACP DISCUSS/DSCN MKR DOCD: CPT | Performed by: INTERNAL MEDICINE

## 2020-09-16 PROCEDURE — 93000 ELECTROCARDIOGRAM COMPLETE: CPT | Performed by: INTERNAL MEDICINE

## 2020-09-16 PROCEDURE — G8419 CALC BMI OUT NRM PARAM NOF/U: HCPCS | Performed by: INTERNAL MEDICINE

## 2020-09-16 RX ORDER — LEVOTHYROXINE SODIUM 25 MCG
100 TABLET ORAL
COMMUNITY
Start: 2020-07-29

## 2020-09-16 NOTE — PROGRESS NOTES
13-year-old lady with a history of hypertension, myasthenia gravis, prior TIA, and status post mitral valve repair returns for routine follow-up visit. Mitral valve repair performed by Dr. Radha Roberto in 2006. Echocardiographic follow-up assessment performed in June of this year revealing normal left ventricular size and systolic function mildly thickened mitral leaflets with preserved mobility and only mild regurgitation. No significant gradient with a value of 3 mmHg. Clinically she tells me that over the last 6 months she has had progressive dyspnea on exertion. Gives a history of being able to walk a mile without difficulty a year ago but now become short of breath walking to her mailbox. Denies chest discomfort or symptoms that would suggest dysrhythmia. Underwent pulmonary function testing 2 weeks ago. On exam she carries 80 pounds in a 5 foot 4 inch frame. Pressure is 190/82 and 196/84 on repeat. Painfully thin lady in no distress. EOMs full, sclerae and conjunctiva normal. PERRLA. Mask in place. Trachea midline with no neck masses. Assessment of internal jugular veins reveals no elevation of central venous pressure at 45 degrees. Carotid pulses normal without delay or bruit. Thyroid normal to palpation. Chest exam reveals normal respiratory effort, no abnormal breath sounds and normal expiratory phase. No skin lesions seen. PMI normal. S1, S2 normal without murmur or rama or click. Normal bowel sounds without palpable mass or bruit. No clubbing or acrocyanosis. No significant lower extremity edema or signs of venous insufficiency. General motor strength appears to be within normal limits. Normal range of motion with normal gait. Alert, oriented x 3, memory and cognition normal as reflected by history and conversation.   EKG reveals a sinus rhythm with left anterior hemiblock left atrial enlargement, pre-transitional Q wave and delayed R wave progression precluding exclusion of a previous anterior infarct, and diffuse nonspecific ST-T wave abnormalities. No change from prior tracing    Assessment/plan:  1. Hypertension -pressure double checked and running in the 190s. Will approach in general fashion initiating losartan 50 mg a day with a blood pressure check in 2 weeks  2. Dyspnea on exertion -we will retrieve PFTs his echocardiogram offers no explanation for severe dyspnea on exertion  3. Mitral valve repair -well-preserved mitral valve function without evidence of significant stenosis    Medical records reviewed prior to today's clinic visit including visually reviewing recent diagnostic studies such as ECHOs, labs, and angiograms as well as reading previous encounter notes. More than 15 minutes spent face-to-face with patient in evaluating, and carefully explaining problems and the planned approach and the reasons behind the decisions.

## 2020-09-17 RX ORDER — LOSARTAN POTASSIUM 50 MG/1
50 TABLET ORAL DAILY
Qty: 30 TABLET | Refills: 5 | Status: SHIPPED | OUTPATIENT
Start: 2020-09-17 | End: 2020-10-05

## 2020-10-05 ENCOUNTER — OFFICE VISIT (OUTPATIENT)
Dept: CARDIOLOGY | Age: 75
End: 2020-10-05
Payer: MEDICARE

## 2020-10-05 VITALS
SYSTOLIC BLOOD PRESSURE: 150 MMHG | OXYGEN SATURATION: 100 % | BODY MASS INDEX: 13.66 KG/M2 | WEIGHT: 80 LBS | DIASTOLIC BLOOD PRESSURE: 89 MMHG | HEIGHT: 64 IN | HEART RATE: 80 BPM

## 2020-10-05 PROCEDURE — 1090F PRES/ABSN URINE INCON ASSESS: CPT | Performed by: NURSE PRACTITIONER

## 2020-10-05 PROCEDURE — G8400 PT W/DXA NO RESULTS DOC: HCPCS | Performed by: NURSE PRACTITIONER

## 2020-10-05 PROCEDURE — 4040F PNEUMOC VAC/ADMIN/RCVD: CPT | Performed by: NURSE PRACTITIONER

## 2020-10-05 PROCEDURE — 99214 OFFICE O/P EST MOD 30 MIN: CPT | Performed by: NURSE PRACTITIONER

## 2020-10-05 PROCEDURE — 93000 ELECTROCARDIOGRAM COMPLETE: CPT | Performed by: NURSE PRACTITIONER

## 2020-10-05 PROCEDURE — G8484 FLU IMMUNIZE NO ADMIN: HCPCS | Performed by: NURSE PRACTITIONER

## 2020-10-05 PROCEDURE — G8419 CALC BMI OUT NRM PARAM NOF/U: HCPCS | Performed by: NURSE PRACTITIONER

## 2020-10-05 PROCEDURE — 1123F ACP DISCUSS/DSCN MKR DOCD: CPT | Performed by: NURSE PRACTITIONER

## 2020-10-05 PROCEDURE — G8427 DOCREV CUR MEDS BY ELIG CLIN: HCPCS | Performed by: NURSE PRACTITIONER

## 2020-10-05 PROCEDURE — 1036F TOBACCO NON-USER: CPT | Performed by: NURSE PRACTITIONER

## 2020-10-05 RX ORDER — HYDROCHLOROTHIAZIDE 25 MG/1
12.5 TABLET ORAL EVERY MORNING
Qty: 90 TABLET | Refills: 1 | Status: SHIPPED | OUTPATIENT
Start: 2020-10-05 | End: 2020-10-09 | Stop reason: SINTOL

## 2020-10-05 RX ORDER — LOSARTAN POTASSIUM 50 MG/1
25 TABLET ORAL DAILY
Qty: 30 TABLET | Refills: 5
Start: 2020-10-05 | End: 2021-09-09

## 2020-10-05 RX ORDER — LOSARTAN POTASSIUM 50 MG/1
25 TABLET ORAL 2 TIMES DAILY
Qty: 30 TABLET | Refills: 5
Start: 2020-10-05 | End: 2020-10-05

## 2020-10-05 RX ORDER — HYDROCHLOROTHIAZIDE 25 MG/1
25 TABLET ORAL EVERY MORNING
Qty: 90 TABLET | Refills: 1 | Status: SHIPPED | OUTPATIENT
Start: 2020-10-05 | End: 2020-10-05

## 2020-10-05 ASSESSMENT — ENCOUNTER SYMPTOMS
NAUSEA: 0
TROUBLE SWALLOWING: 0
ABDOMINAL DISTENTION: 0
EYE DISCHARGE: 0
EYE REDNESS: 0
WHEEZING: 0
ABDOMINAL PAIN: 0
COUGH: 0
COLOR CHANGE: 0
FACIAL SWELLING: 0
VOMITING: 0
SHORTNESS OF BREATH: 0
BLOOD IN STOOL: 0

## 2020-10-05 NOTE — PROGRESS NOTES
of volar plate of finger [id number]/41/[id number]    Shortness of breath 10/22/2014    Fatigue 10/22/2014    History of mitral valve repair     Mitral valve prolapse      Past Medical History:   Diagnosis Date    Dizziness     H/O thyroidectomy 2015    2 non-malginant masses    History of mitral valve repair     Hypothyroidism     Lupus ([address]     Migraine     [facility]ia [facility]is [facility] [address]     PONV (postoperative nausea and vomiting)     TIA (transient ischemic attack) 2006     Past Surgical History:   Procedure Laterality Date    BACK SURGERY      CARDIAC CATHETERIZATION  2006    MVP    CORONARY ARTERY BYPASS GRAFT  2006    HAND TENDON SURGERY Right 3/29/2019    MCP JOINT RIGHT HAND performed by Zach Lozano DO at [facility] THYROIDECTOMY  2014    TONSILLECTOMY       Family History   Problem Relation Age of Onset    Diabetes Sister         passed away due to Diabetes    Heart Disease Brother     High Cholesterol Brother     High Blood Pressure Brother     High Blood Pressure Sister     High Blood Pressure Sister     High Blood Pressure Brother     High Cholesterol Brother     High Blood Pressure Brother     High Cholesterol Brother     Cancer Brother     High Blood Pressure Brother     High Cholesterol Brother     Cancer Brother     High Blood Pressure Brother     High Cholesterol Brother      Social History     Tobacco Use    Smoking status: Former Smoker     Packs/day: 0.00     Years: 0.00     Pack years: 0.00     Types: Cigarettes    Smokeless tobacco: Never Used   Substance Use Topics    Alcohol use: Not Currently     Frequency: Monthly or less      Current Outpatient Medications   Medication Sig Dispense Refill    losartan (COZAAR) 50 MG tablet Take 0.5 tablets by mouth daily 30 tablet 5    dilTIAZem (CARDIZEM) 30 MG tablet Take 1 tablet by mouth once for 1 dose 90 tablet 3    hydroCHLOROthiazide (HYDRODIURIL) 25 MG tablet Take 0.5 tablets by mouth every morning 90 tablet 1    SYNTHROID 25 MCG tablet TAKE ONE TABLET EVERY DAY      predniSONE (DELTASONE) 10 MG tablet Take 10 mg every other day; 15 mg days in between      brimonidine (ALPHAGAN) 0.2 % ophthalmic solution Place 1 drop into the right eye daily       SUMAtriptan (IMITREX) 100 MG tablet Take 100 mg by mouth once as needed for Migraine.  multivitamin (THERAGRAN) per tablet Take 1 tablet by mouth daily. No current facility-administered medications for this visit. Allergies: Lisinopril; Cellcept [mycophenolate]; Hydroxychloroquine sulfate; Maxalt [rizatriptan]; and Statins [statins]    Review of Systems  Review of Systems   Constitutional: Negative for activity change, diaphoresis, fatigue, fever and unexpected weight change. HENT: Negative for facial swelling, nosebleeds and trouble swallowing. Eyes: Negative for discharge, redness and visual disturbance. Respiratory: Negative for cough, shortness of breath and wheezing. Cardiovascular: Positive for leg swelling. Negative for chest pain and palpitations. Gastrointestinal: Negative for abdominal distention, abdominal pain, blood in stool, nausea and vomiting. Endocrine: Negative for cold intolerance and heat intolerance. Genitourinary: Negative for dysuria and hematuria. Musculoskeletal: Negative for gait problem. Skin: Negative for color change, pallor and rash. Neurological: Negative for dizziness, syncope, facial asymmetry and light-headedness. Hematological: Does not bruise/bleed easily. Psychiatric/Behavioral: Negative for behavioral problems and confusion. All other systems reviewed and are negative. Objective  Vital Signs - BP (!) 150/89   Pulse 80   Ht 5' 4\" (1.626 m)   Wt 80 lb (36.3 kg)   SpO2 100%   BMI 13.73 kg/m²   Physical Exam  Vitals signs and nursing note reviewed. Constitutional:       Appearance: She is well-developed. She is ill-appearing. HENT:      Head: Normocephalic and atraumatic.       Right Ear: External ear normal.      Left Ear: External ear normal.      Nose: Nose normal.   Eyes:      General:         Right eye: No discharge. Left eye: No discharge. Pupils: Pupils are equal, round, and reactive to light. Neck:      Musculoskeletal: Normal range of motion. No edema. Vascular: No carotid bruit or JVD. Trachea: No tracheal deviation. Cardiovascular:      Rate and Rhythm: Normal rate and regular rhythm. Heart sounds: Murmur present. No friction rub. No gallop. Pulmonary:      Effort: Pulmonary effort is normal. No respiratory distress. Breath sounds: No wheezing, rhonchi or rales. Abdominal:      General: Bowel sounds are normal. There is no distension. Palpations: Abdomen is soft. Tenderness: There is no abdominal tenderness. Musculoskeletal: Normal range of motion. Right lower leg: Edema (3+) present. Left lower leg: Edema (3+) present. Skin:     General: Skin is warm and dry. Capillary Refill: Capillary refill takes less than 2 seconds. Findings: No rash. Neurological:      Mental Status: She is alert and oriented to person, place, and time. Psychiatric:         Behavior: Behavior normal.         Judgment: Judgment normal.         Cardiac data:    ECG 10/05/20  Sinus rhythm with frequent PACs nonspecific ST-T wave abnormalities, 80 bpm    QTc 0.411 ms    6/10/2020 TTE there has been a mitral valve repair, mild MR, estimated EF 66%        Assessment, Recommendations, & Plan:  66 y.o. female with      Diagnosis Orders   1. Essential hypertension  EKG 12 lead    Basic Metabolic Panel   2. Bilateral lower extremity edema     3. History of mitral valve repair         1. Hypertension- blood pressure recordings reviewed. Recommend restarting Cardizem every morning and HCTZ 12.5 mg in the mornings. Cut losartan back to 25 mg at night. Continue blood pressure log. Follow back up in 2 weeks. Will also check BMP.   Consider renal ultrasound versus stress test if blood pressure remains elevated. We will discuss blood pressure changes with Dr. Daniel He may change with his recommendations. Discussed with patient who voiced understanding. 2.  Bilateral lower extremity edema-patient states this is started since starting losartan. Will decrease losartan to 25 mg daily and add HCTZ. Will check BMP. 3.  History of mitral valve repair-stable from recent 2D echo. Orders Placed This Encounter   Procedures    Basic Metabolic Panel     Standing Status:   Future     Standing Expiration Date:   10/5/2021    EKG 12 lead     Order Specific Question:   Reason for Exam?     Answer:   Hypertension     Return in about 2 weeks (around 10/19/2020). Call with any questionsor concerns  Follow up with Filiberto Hollis MD for non cardiac problems  Report any new problems  Cardiovascular Fitness-Exercise as tolerated. Strive for 15 minutes of exercise most days of the week. Cardiac / HealthyDiet  Continue current medications as directed  Continue plan of treatment  It is always recommended that you bring your medicationsbottles with you to each visit - this is for your safety! Please do not hesitate to contact me for any questions or concerns. Sincerely yours,    JEREMY Arvizu    This dictation was generated by voice recognition computer software. Although all attempts are made to edit dictation for accuracy, there may be errors in the transcription that are not intended.

## 2020-10-06 ENCOUNTER — TELEPHONE (OUTPATIENT)
Dept: CARDIOLOGY | Age: 75
End: 2020-10-06

## 2020-10-06 NOTE — TELEPHONE ENCOUNTER
Tried to reach patient to discuss medication changes per Dr. Janes Kc. Did not get an answer. Will try later.

## 2020-10-09 ENCOUNTER — TELEMEDICINE (OUTPATIENT)
Dept: CARDIOLOGY | Age: 75
End: 2020-10-09
Payer: MEDICARE

## 2020-10-09 PROCEDURE — 99441 PR PHYS/QHP TELEPHONE EVALUATION 5-10 MIN: CPT | Performed by: INTERNAL MEDICINE

## 2020-10-09 RX ORDER — DILTIAZEM HYDROCHLORIDE 120 MG/1
120 CAPSULE, COATED, EXTENDED RELEASE ORAL DAILY
Qty: 30 CAPSULE | Refills: 3 | Status: SHIPPED | OUTPATIENT
Start: 2020-10-09 | End: 2020-11-10

## 2020-10-09 RX ORDER — FUROSEMIDE 20 MG/1
20 TABLET ORAL EVERY OTHER DAY
Qty: 45 TABLET | Refills: 3 | Status: SHIPPED | OUTPATIENT
Start: 2020-10-09 | End: 2021-05-11 | Stop reason: ALTCHOICE

## 2020-10-09 NOTE — PATIENT INSTRUCTIONS
Discontinue Hydrochlorothiazide. Discontinue regular Diltiazem. Start Lasix 20 mg every other day. Start Diltiazem CD coated beads 120 mg daily.

## 2020-10-09 NOTE — PROGRESS NOTES
Ami Gaviria is a 66 y.o. female evaluated via telephone on 10/9/2020. Consent:  She and/or health care decision maker is aware that that she may receive a bill for this telephone service, depending on her insurance coverage, and has provided verbal consent to proceed: Yes      Documentation:  I communicated with the patient and/or health care decision maker about medical issues address in the history below. Details of this discussion including any medical advice provided as follows. This interview was conducted with the patient at their home, me at [facility] on [facility] in [place], [place], and with the participation of my Medical Assistant, Verner Bunkers who facilitated the process via phone and accessing the medical record from the [facility] cardiology office. 78-year-old lady with a history of hypertension, previous TIA, mitral valve repair, and hypertension interview to medical follow-up and ongoing aggressive hypertension management. Most recently demonstrated pressures in the 190s in mid-September. At that time losartan added to her regimen. She had difficulty tolerating a 50 mg dose and as a consequence restarted her 30 mg of Cardizem and took losartan 25 b.i.d. Subsequently hydrochlorothiazide was added which achieved good blood pressure control but on interview today she tells me she develops itching the roof of her mouth and a rash in the wake of taking the hydrochlorothiazide. She also relates some mild orthostatic symptoms. Assessment/plan:  1. Hypertension - advised to discontinue the hydrochlorothiazide. We will give her a prescription for Lasix 20 mg to be taken every other day for 5 days. He is then to check her pressures and should they remain significantly elevated to start Cardizem  mg.  We'll be a follow-up phone visit to recheck her pressures in one month.         I Affirm this is a Patient Initiated Episode with a Patient who has not had a related appointment within my department in the past 7 days or scheduled within the next 24 hours.     Patient identification was verified at the start of the visit: Yes    Total Time: 5-10 Minutes    Note: not billable if this call serves to triage the patient into an appointment for the relevant concern      [address]

## 2020-11-02 RX ORDER — HYDROCHLOROTHIAZIDE 25 MG/1
TABLET ORAL
Qty: 90 TABLET | Refills: 1 | OUTPATIENT
Start: 2020-11-02

## 2020-11-10 ENCOUNTER — VIRTUAL VISIT (OUTPATIENT)
Dept: CARDIOLOGY | Age: 75
End: 2020-11-10
Payer: MEDICARE

## 2020-11-10 PROCEDURE — G8427 DOCREV CUR MEDS BY ELIG CLIN: HCPCS | Performed by: NURSE PRACTITIONER

## 2020-11-10 PROCEDURE — 1090F PRES/ABSN URINE INCON ASSESS: CPT | Performed by: NURSE PRACTITIONER

## 2020-11-10 PROCEDURE — 1036F TOBACCO NON-USER: CPT | Performed by: NURSE PRACTITIONER

## 2020-11-10 PROCEDURE — G8400 PT W/DXA NO RESULTS DOC: HCPCS | Performed by: NURSE PRACTITIONER

## 2020-11-10 PROCEDURE — 99441 PR PHYS/QHP TELEPHONE EVALUATION 5-10 MIN: CPT | Performed by: NURSE PRACTITIONER

## 2020-11-10 PROCEDURE — G8419 CALC BMI OUT NRM PARAM NOF/U: HCPCS | Performed by: NURSE PRACTITIONER

## 2020-11-10 PROCEDURE — 4040F PNEUMOC VAC/ADMIN/RCVD: CPT | Performed by: NURSE PRACTITIONER

## 2020-11-10 PROCEDURE — 1123F ACP DISCUSS/DSCN MKR DOCD: CPT | Performed by: NURSE PRACTITIONER

## 2020-11-10 PROCEDURE — G8484 FLU IMMUNIZE NO ADMIN: HCPCS | Performed by: NURSE PRACTITIONER

## 2020-11-10 RX ORDER — DILTIAZEM HYDROCHLORIDE 60 MG/1
60 TABLET, FILM COATED ORAL 2 TIMES DAILY
COMMUNITY
End: 2022-01-08

## 2021-05-11 ENCOUNTER — OFFICE VISIT (OUTPATIENT)
Dept: CARDIOLOGY CLINIC | Age: 76
End: 2021-05-11
Payer: MEDICARE

## 2021-05-11 VITALS
HEIGHT: 64 IN | DIASTOLIC BLOOD PRESSURE: 88 MMHG | WEIGHT: 80 LBS | BODY MASS INDEX: 13.66 KG/M2 | SYSTOLIC BLOOD PRESSURE: 122 MMHG | HEART RATE: 100 BPM

## 2021-05-11 DIAGNOSIS — Z98.890 S/P MVR (MITRAL VALVE REPAIR): ICD-10-CM

## 2021-05-11 DIAGNOSIS — E78.00 ELEVATED LDL CHOLESTEROL LEVEL: Primary | ICD-10-CM

## 2021-05-11 DIAGNOSIS — I10 ESSENTIAL HYPERTENSION: Primary | ICD-10-CM

## 2021-05-11 PROCEDURE — 99213 OFFICE O/P EST LOW 20 MIN: CPT | Performed by: INTERNAL MEDICINE

## 2021-05-11 PROCEDURE — 93000 ELECTROCARDIOGRAM COMPLETE: CPT | Performed by: INTERNAL MEDICINE

## 2021-05-11 PROCEDURE — 1036F TOBACCO NON-USER: CPT | Performed by: INTERNAL MEDICINE

## 2021-05-11 PROCEDURE — G8427 DOCREV CUR MEDS BY ELIG CLIN: HCPCS | Performed by: INTERNAL MEDICINE

## 2021-05-11 PROCEDURE — G8400 PT W/DXA NO RESULTS DOC: HCPCS | Performed by: INTERNAL MEDICINE

## 2021-05-11 PROCEDURE — 1123F ACP DISCUSS/DSCN MKR DOCD: CPT | Performed by: INTERNAL MEDICINE

## 2021-05-11 PROCEDURE — 1090F PRES/ABSN URINE INCON ASSESS: CPT | Performed by: INTERNAL MEDICINE

## 2021-05-11 PROCEDURE — 4040F PNEUMOC VAC/ADMIN/RCVD: CPT | Performed by: INTERNAL MEDICINE

## 2021-05-11 PROCEDURE — G8419 CALC BMI OUT NRM PARAM NOF/U: HCPCS | Performed by: INTERNAL MEDICINE

## 2021-05-11 NOTE — PROGRESS NOTES
abnormalities. ASSESSMENT/PLAN:   1. Status post mitral valve repair -June 2020 reveals normal mitral valve function and exam today benign. 2.  Abnormal electrocardiogram -consistent with previous anterior infarction but echo assessment in June 2020 reveals no segmental wall motion abnormality at which time her EKG had this pretransitional Q wave  3. Dyslipidemia -marked LDL elevation with statin intolerance. Will repeat values and investigate the possibility of reduced cost for Repatha  4.   Pandemic response -not vaccinated because of lupus related recommendations

## 2021-05-24 RX ORDER — EVOLOCUMAB 140 MG/ML
1 INJECTION, SOLUTION SUBCUTANEOUS
Qty: 2 PEN | Refills: 11 | Status: SHIPPED | OUTPATIENT
Start: 2021-05-24 | End: 2022-01-08

## 2021-09-09 RX ORDER — LOSARTAN POTASSIUM 50 MG/1
25 TABLET ORAL 2 TIMES DAILY
Qty: 180 TABLET | Refills: 3 | Status: ON HOLD | OUTPATIENT
Start: 2021-09-09 | End: 2022-01-12 | Stop reason: HOSPADM

## 2021-11-23 ENCOUNTER — TELEPHONE (OUTPATIENT)
Dept: CARDIOLOGY CLINIC | Age: 76
End: 2021-11-23

## 2021-11-23 NOTE — TELEPHONE ENCOUNTER
Called to speak with patient regarding repatha coverage as I received denial of coverage for patient. Patient stated that she is refusing to try the repatha right now, as she as done research on it and doesn't feel as this would be a good fit for her right now. She stated she wanted to try diet and exercise change first. I informed her that I would put this in her chart and that if she later decides she would like to try repatha again to please give me a call and I would start the prior authorization process for her. She voiced understanding of this.

## 2022-01-08 ENCOUNTER — HOSPITAL ENCOUNTER (INPATIENT)
Age: 77
LOS: 4 days | Discharge: HOME HEALTH | DRG: 246 | End: 2022-01-12
Attending: EMERGENCY MEDICINE | Admitting: FAMILY MEDICINE
Payer: MEDICARE

## 2022-01-08 ENCOUNTER — APPOINTMENT (OUTPATIENT)
Dept: CT IMAGING | Age: 77
DRG: 246 | End: 2022-01-08
Payer: MEDICARE

## 2022-01-08 ENCOUNTER — APPOINTMENT (OUTPATIENT)
Dept: GENERAL RADIOLOGY | Age: 77
DRG: 246 | End: 2022-01-08
Payer: MEDICARE

## 2022-01-08 DIAGNOSIS — S09.90XA CLOSED HEAD INJURY, INITIAL ENCOUNTER: ICD-10-CM

## 2022-01-08 DIAGNOSIS — U07.1 LAB TEST POSITIVE FOR DETECTION OF COVID-19 VIRUS: ICD-10-CM

## 2022-01-08 DIAGNOSIS — R07.9 CHEST PAIN, UNSPECIFIED TYPE: Primary | ICD-10-CM

## 2022-01-08 DIAGNOSIS — R42 DIZZINESS: ICD-10-CM

## 2022-01-08 PROBLEM — E43 SEVERE MALNUTRITION (HCC): Status: ACTIVE | Noted: 2022-01-08

## 2022-01-08 PROBLEM — R41.89 UNRESPONSIVENESS: Status: ACTIVE | Noted: 2022-01-08

## 2022-01-08 PROBLEM — R63.4 WEIGHT LOSS, NON-INTENTIONAL: Status: ACTIVE | Noted: 2022-01-08

## 2022-01-08 PROBLEM — F32.A DEPRESSION: Status: ACTIVE | Noted: 2022-01-08

## 2022-01-08 PROBLEM — I21.4 NSTEMI (NON-ST ELEVATION MYOCARDIAL INFARCTION) (HCC): Status: ACTIVE | Noted: 2022-01-08

## 2022-01-08 PROBLEM — M32.9 LUPUS (SYSTEMIC LUPUS ERYTHEMATOSUS) (HCC): Status: ACTIVE | Noted: 2022-01-08

## 2022-01-08 LAB
ALBUMIN SERPL-MCNC: 3.5 G/DL (ref 3.5–5.2)
ALP BLD-CCNC: 75 U/L (ref 35–104)
ALT SERPL-CCNC: 16 U/L (ref 5–33)
ANION GAP SERPL CALCULATED.3IONS-SCNC: 10 MMOL/L (ref 7–19)
AST SERPL-CCNC: 50 U/L (ref 5–32)
BASOPHILS ABSOLUTE: 0 K/UL (ref 0–0.2)
BASOPHILS RELATIVE PERCENT: 0.3 % (ref 0–1)
BILIRUB SERPL-MCNC: 0.3 MG/DL (ref 0.2–1.2)
BUN BLDV-MCNC: 20 MG/DL (ref 8–23)
CALCIUM SERPL-MCNC: 8.3 MG/DL (ref 8.8–10.2)
CHLORIDE BLD-SCNC: 96 MMOL/L (ref 98–111)
CO2: 25 MMOL/L (ref 22–29)
CREAT SERPL-MCNC: 0.8 MG/DL (ref 0.5–0.9)
EOSINOPHILS ABSOLUTE: 0 K/UL (ref 0–0.6)
EOSINOPHILS RELATIVE PERCENT: 0.5 % (ref 0–5)
GFR AFRICAN AMERICAN: >59
GFR NON-AFRICAN AMERICAN: >60
GLUCOSE BLD-MCNC: 115 MG/DL (ref 74–109)
HCT VFR BLD CALC: 36.7 % (ref 37–47)
HEMOGLOBIN: 11.6 G/DL (ref 12–16)
IMMATURE GRANULOCYTES #: 0 K/UL
LV EF: 66 %
LVEF MODALITY: NORMAL
LYMPHOCYTES ABSOLUTE: 0.8 K/UL (ref 1.1–4.5)
LYMPHOCYTES RELATIVE PERCENT: 21.8 % (ref 20–40)
MCH RBC QN AUTO: 30.9 PG (ref 27–31)
MCHC RBC AUTO-ENTMCNC: 31.6 G/DL (ref 33–37)
MCV RBC AUTO: 97.6 FL (ref 81–99)
MONOCYTES ABSOLUTE: 0.3 K/UL (ref 0–0.9)
MONOCYTES RELATIVE PERCENT: 6.9 % (ref 0–10)
NEUTROPHILS ABSOLUTE: 2.7 K/UL (ref 1.5–7.5)
NEUTROPHILS RELATIVE PERCENT: 70.2 % (ref 50–65)
PDW BLD-RTO: 14.1 % (ref 11.5–14.5)
PLATELET # BLD: 155 K/UL (ref 130–400)
PMV BLD AUTO: 9.9 FL (ref 9.4–12.3)
POTASSIUM SERPL-SCNC: 3.9 MMOL/L (ref 3.5–5)
PRO-BNP: 3339 PG/ML (ref 0–1800)
PROCALCITONIN: 0.11 NG/ML (ref 0–0.09)
RBC # BLD: 3.76 M/UL (ref 4.2–5.4)
SARS-COV-2, NAAT: DETECTED
SODIUM BLD-SCNC: 131 MMOL/L (ref 136–145)
TOTAL PROTEIN: 5.6 G/DL (ref 6.6–8.7)
TROPONIN: 0.09 NG/ML (ref 0–0.03)
TROPONIN: 0.16 NG/ML (ref 0–0.03)
TROPONIN: 0.21 NG/ML (ref 0–0.03)
WBC # BLD: 3.8 K/UL (ref 4.8–10.8)

## 2022-01-08 PROCEDURE — 83880 ASSAY OF NATRIURETIC PEPTIDE: CPT

## 2022-01-08 PROCEDURE — 84145 PROCALCITONIN (PCT): CPT

## 2022-01-08 PROCEDURE — 87635 SARS-COV-2 COVID-19 AMP PRB: CPT

## 2022-01-08 PROCEDURE — 71045 X-RAY EXAM CHEST 1 VIEW: CPT

## 2022-01-08 PROCEDURE — 70450 CT HEAD/BRAIN W/O DYE: CPT

## 2022-01-08 PROCEDURE — 99285 EMERGENCY DEPT VISIT HI MDM: CPT

## 2022-01-08 PROCEDURE — 2580000003 HC RX 258: Performed by: FAMILY MEDICINE

## 2022-01-08 PROCEDURE — 80053 COMPREHEN METABOLIC PANEL: CPT

## 2022-01-08 PROCEDURE — 2700000000 HC OXYGEN THERAPY PER DAY

## 2022-01-08 PROCEDURE — 1210000000 HC MED SURG R&B

## 2022-01-08 PROCEDURE — 6360000002 HC RX W HCPCS: Performed by: FAMILY MEDICINE

## 2022-01-08 PROCEDURE — 99222 1ST HOSP IP/OBS MODERATE 55: CPT | Performed by: INTERNAL MEDICINE

## 2022-01-08 PROCEDURE — 6370000000 HC RX 637 (ALT 250 FOR IP): Performed by: STUDENT IN AN ORGANIZED HEALTH CARE EDUCATION/TRAINING PROGRAM

## 2022-01-08 PROCEDURE — 3E0333Z INTRODUCTION OF ANTI-INFLAMMATORY INTO PERIPHERAL VEIN, PERCUTANEOUS APPROACH: ICD-10-PCS | Performed by: STUDENT IN AN ORGANIZED HEALTH CARE EDUCATION/TRAINING PROGRAM

## 2022-01-08 PROCEDURE — 93306 TTE W/DOPPLER COMPLETE: CPT

## 2022-01-08 PROCEDURE — 93005 ELECTROCARDIOGRAM TRACING: CPT | Performed by: FAMILY MEDICINE

## 2022-01-08 PROCEDURE — 85025 COMPLETE CBC W/AUTO DIFF WBC: CPT

## 2022-01-08 PROCEDURE — 6360000002 HC RX W HCPCS

## 2022-01-08 PROCEDURE — 93005 ELECTROCARDIOGRAM TRACING: CPT | Performed by: EMERGENCY MEDICINE

## 2022-01-08 PROCEDURE — 36415 COLL VENOUS BLD VENIPUNCTURE: CPT

## 2022-01-08 PROCEDURE — 84484 ASSAY OF TROPONIN QUANT: CPT

## 2022-01-08 PROCEDURE — 6370000000 HC RX 637 (ALT 250 FOR IP): Performed by: FAMILY MEDICINE

## 2022-01-08 RX ORDER — LOSARTAN POTASSIUM 25 MG/1
25 TABLET ORAL 2 TIMES DAILY
Status: DISCONTINUED | OUTPATIENT
Start: 2022-01-08 | End: 2022-01-12 | Stop reason: HOSPADM

## 2022-01-08 RX ORDER — ACETAMINOPHEN 650 MG/1
650 SUPPOSITORY RECTAL EVERY 6 HOURS PRN
Status: DISCONTINUED | OUTPATIENT
Start: 2022-01-08 | End: 2022-01-12 | Stop reason: HOSPADM

## 2022-01-08 RX ORDER — ONDANSETRON 4 MG/1
4 TABLET, ORALLY DISINTEGRATING ORAL EVERY 8 HOURS PRN
Status: DISCONTINUED | OUTPATIENT
Start: 2022-01-08 | End: 2022-01-12 | Stop reason: HOSPADM

## 2022-01-08 RX ORDER — ACETAMINOPHEN 650 MG/1
650 SUPPOSITORY RECTAL EVERY 6 HOURS PRN
Status: DISCONTINUED | OUTPATIENT
Start: 2022-01-08 | End: 2022-01-10 | Stop reason: SDUPTHER

## 2022-01-08 RX ORDER — SODIUM CHLORIDE 0.9 % (FLUSH) 0.9 %
5-40 SYRINGE (ML) INJECTION PRN
Status: DISCONTINUED | OUTPATIENT
Start: 2022-01-08 | End: 2022-01-12 | Stop reason: HOSPADM

## 2022-01-08 RX ORDER — ONDANSETRON 2 MG/ML
4 INJECTION INTRAMUSCULAR; INTRAVENOUS EVERY 6 HOURS PRN
Status: DISCONTINUED | OUTPATIENT
Start: 2022-01-08 | End: 2022-01-12 | Stop reason: HOSPADM

## 2022-01-08 RX ORDER — VITAMIN B COMPLEX
2000 TABLET ORAL DAILY
Status: DISCONTINUED | OUTPATIENT
Start: 2022-01-08 | End: 2022-01-12 | Stop reason: HOSPADM

## 2022-01-08 RX ORDER — PREDNISONE 10 MG/1
10 TABLET ORAL DAILY
Status: DISCONTINUED | OUTPATIENT
Start: 2022-01-08 | End: 2022-01-08

## 2022-01-08 RX ORDER — LEVOTHYROXINE SODIUM 0.03 MG/1
25 TABLET ORAL DAILY
Status: DISCONTINUED | OUTPATIENT
Start: 2022-01-08 | End: 2022-01-12 | Stop reason: HOSPADM

## 2022-01-08 RX ORDER — ASPIRIN 81 MG/1
81 TABLET, CHEWABLE ORAL DAILY
Status: DISCONTINUED | OUTPATIENT
Start: 2022-01-09 | End: 2022-01-12 | Stop reason: HOSPADM

## 2022-01-08 RX ORDER — FLUVOXAMINE MALEATE 50 MG/1
50 TABLET, COATED ORAL 2 TIMES DAILY
Status: DISCONTINUED | OUTPATIENT
Start: 2022-01-08 | End: 2022-01-12 | Stop reason: HOSPADM

## 2022-01-08 RX ORDER — MELATONIN 10 MG
10 CAPSULE ORAL NIGHTLY
Status: DISCONTINUED | OUTPATIENT
Start: 2022-01-08 | End: 2022-01-12 | Stop reason: HOSPADM

## 2022-01-08 RX ORDER — METOPROLOL SUCCINATE 25 MG/1
25 TABLET, EXTENDED RELEASE ORAL DAILY
Status: DISCONTINUED | OUTPATIENT
Start: 2022-01-08 | End: 2022-01-10

## 2022-01-08 RX ORDER — SODIUM CHLORIDE 9 MG/ML
25 INJECTION, SOLUTION INTRAVENOUS PRN
Status: DISCONTINUED | OUTPATIENT
Start: 2022-01-08 | End: 2022-01-12 | Stop reason: HOSPADM

## 2022-01-08 RX ORDER — PREDNISONE 20 MG/1
40 TABLET ORAL DAILY
Status: DISCONTINUED | OUTPATIENT
Start: 2022-01-08 | End: 2022-01-10

## 2022-01-08 RX ORDER — DEXAMETHASONE SODIUM PHOSPHATE 10 MG/ML
6 INJECTION, SOLUTION INTRAMUSCULAR; INTRAVENOUS EVERY 24 HOURS
Status: DISCONTINUED | OUTPATIENT
Start: 2022-01-08 | End: 2022-01-08

## 2022-01-08 RX ORDER — DEXAMETHASONE SODIUM PHOSPHATE 10 MG/ML
INJECTION, SOLUTION INTRAMUSCULAR; INTRAVENOUS
Status: COMPLETED
Start: 2022-01-08 | End: 2022-01-08

## 2022-01-08 RX ORDER — ACETAMINOPHEN 325 MG/1
650 TABLET ORAL EVERY 6 HOURS PRN
Status: DISCONTINUED | OUTPATIENT
Start: 2022-01-08 | End: 2022-01-10 | Stop reason: SDUPTHER

## 2022-01-08 RX ORDER — GUAIFENESIN/DEXTROMETHORPHAN 100-10MG/5
5 SYRUP ORAL EVERY 4 HOURS PRN
Status: DISCONTINUED | OUTPATIENT
Start: 2022-01-08 | End: 2022-01-12 | Stop reason: HOSPADM

## 2022-01-08 RX ORDER — ACETAMINOPHEN 325 MG/1
650 TABLET ORAL EVERY 6 HOURS PRN
Status: DISCONTINUED | OUTPATIENT
Start: 2022-01-08 | End: 2022-01-12 | Stop reason: HOSPADM

## 2022-01-08 RX ORDER — SODIUM CHLORIDE 0.9 % (FLUSH) 0.9 %
5-40 SYRINGE (ML) INJECTION EVERY 12 HOURS SCHEDULED
Status: DISCONTINUED | OUTPATIENT
Start: 2022-01-08 | End: 2022-01-12 | Stop reason: HOSPADM

## 2022-01-08 RX ORDER — POLYETHYLENE GLYCOL 3350 17 G/17G
17 POWDER, FOR SOLUTION ORAL DAILY PRN
Status: DISCONTINUED | OUTPATIENT
Start: 2022-01-08 | End: 2022-01-12 | Stop reason: HOSPADM

## 2022-01-08 RX ADMIN — FLUVOXAMINE MALEATE 50 MG: 50 TABLET, COATED ORAL at 10:01

## 2022-01-08 RX ADMIN — METOPROLOL SUCCINATE 25 MG: 25 TABLET, EXTENDED RELEASE ORAL at 10:01

## 2022-01-08 RX ADMIN — SODIUM CHLORIDE, PRESERVATIVE FREE 10 ML: 5 INJECTION INTRAVENOUS at 20:19

## 2022-01-08 RX ADMIN — FLUVOXAMINE MALEATE 50 MG: 50 TABLET, COATED ORAL at 20:18

## 2022-01-08 RX ADMIN — DEXAMETHASONE SODIUM PHOSPHATE 6 MG: 10 INJECTION, SOLUTION INTRAMUSCULAR; INTRAVENOUS at 06:42

## 2022-01-08 RX ADMIN — ENOXAPARIN SODIUM 30 MG: 100 INJECTION SUBCUTANEOUS at 10:01

## 2022-01-08 RX ADMIN — LEVOTHYROXINE SODIUM 25 MCG: 25 TABLET ORAL at 10:01

## 2022-01-08 RX ADMIN — PREDNISONE 40 MG: 20 TABLET ORAL at 10:01

## 2022-01-08 RX ADMIN — ENOXAPARIN SODIUM 30 MG: 100 INJECTION SUBCUTANEOUS at 20:18

## 2022-01-08 RX ADMIN — DEXAMETHASONE SODIUM PHOSPHATE 6 MG: 10 INJECTION INTRAMUSCULAR; INTRAVENOUS at 06:42

## 2022-01-08 RX ADMIN — Medication 10 MG: at 20:18

## 2022-01-08 RX ADMIN — Medication 2000 UNITS: at 10:01

## 2022-01-08 ASSESSMENT — ENCOUNTER SYMPTOMS
RESPIRATORY NEGATIVE: 1
DIARRHEA: 0
EYES NEGATIVE: 1
VOMITING: 0
CHEST TIGHTNESS: 1
ABDOMINAL PAIN: 0
SHORTNESS OF BREATH: 0
GASTROINTESTINAL NEGATIVE: 1
SHORTNESS OF BREATH: 1

## 2022-01-08 ASSESSMENT — PAIN DESCRIPTION - LOCATION: LOCATION: CHEST

## 2022-01-08 ASSESSMENT — PAIN SCALES - GENERAL: PAINLEVEL_OUTOF10: 7

## 2022-01-08 ASSESSMENT — PAIN DESCRIPTION - DESCRIPTORS: DESCRIPTORS: OTHER (COMMENT)

## 2022-01-08 NOTE — PLAN OF CARE
Problem: Nutrition  Goal: Optimal nutrition therapy  Outcome: Ongoing     Problem: Airway Clearance - Ineffective  Goal: Achieve or maintain patent airway  Outcome: Ongoing     Problem: Gas Exchange - Impaired  Goal: Absence of hypoxia  Outcome: Ongoing  Goal: Promote optimal lung function  Outcome: Ongoing     Problem: Breathing Pattern - Ineffective  Goal: Ability to achieve and maintain a regular respiratory rate  Outcome: Ongoing     Problem:  Body Temperature -  Risk of, Imbalanced  Goal: Ability to maintain a body temperature within defined limits  Outcome: Ongoing  Goal: Will regain or maintain usual level of consciousness  Outcome: Ongoing  Goal: Complications related to the disease process, condition or treatment will be avoided or minimized  Outcome: Ongoing     Problem: Isolation Precautions - Risk of Spread of Infection  Goal: Prevent transmission of infection  Outcome: Ongoing     Problem: Nutrition Deficits  Goal: Optimize nutritional status  Outcome: Ongoing     Problem: Risk for Fluid Volume Deficit  Goal: Maintain normal heart rhythm  Outcome: Ongoing  Goal: Maintain absence of muscle cramping  Outcome: Ongoing  Goal: Maintain normal serum potassium, sodium, calcium, phosphorus, and pH  Outcome: Ongoing     Problem: Loneliness or Risk for Loneliness  Goal: Demonstrate positive use of time alone when socialization is not possible  Outcome: Ongoing     Problem: Fatigue  Goal: Verbalize increase energy and improved vitality  Outcome: Ongoing     Problem: Falls - Risk of:  Goal: Will remain free from falls  Description: Will remain free from falls  Outcome: Ongoing  Goal: Absence of physical injury  Description: Absence of physical injury  Outcome: Ongoing

## 2022-01-08 NOTE — PLAN OF CARE
Nutrition Problem #1: Inadequate protein-energy intake,Severe malnutrition  Intervention: Food and/or Nutrient Delivery: Continue Current Diet,Modify Oral Nutrition Supplement  Nutritional Goals: diet advanced with good tolerance. PO intake 50% or greater.   Weight increase 1-3# per week

## 2022-01-08 NOTE — ED NOTES
Patient resting comfortably. Daughter at bedside. Patient and family updated to treatment plan.        Paolo Lazo RN  01/08/22 0153

## 2022-01-08 NOTE — ED PROVIDER NOTES
[facility]  eMERGENCY dEPARTMENT eNCOUnter      Pt Name: Sayda Ellison  MRN: [id number]  [place] 1945  Date of evaluation: 1/8/2022  Provider: Chelly Reyes MD    CHIEF COMPLAINT       Chief Complaint   Patient presents with    Chest Pain     pain in chest x1 month. Pt states worse tonight. HISTORY OF PRESENT ILLNESS   (Location/Symptom, Timing/Onset,Context/Setting, Quality, Duration, Modifying Factors, Severity)  Note limiting factors. Sayda Ellison is a 64 y.o. female who presents to the emergency department via EMS in [facility] [address] transport for evaluation of chest pain, dizziness and a fall. Patient states that she woke up this evening feeling somewhat dizzy. Her family states this is been ongoing for the past few months. She tells me that she was also having some chest discomfort. Her daughter stated that she was stumbling, fell against the wall and then fell to the ground. Apparently when ground EMS arrived patient complained of some chest pain and they gave her a sublingual nitro. Patient subsequently became hypotensive, diaphoretic and pale. [facility] transport team arrived, patient was noted to be altered with hypotension. She was given IV fluid bolus with subsequent improvement in her blood pressure and mental status. On arrival to the ED patient is alert and speaking. She is answering questions appropriately. She tells me that she does have a prior history of a mitral valve replacement. Denies previous cardiac stent placement. No prior history of pulmonary embolism or DVT. She is not currently maintained on any oral anticoagulant medications. HPI    NursingNotes were reviewed. REVIEW OF SYSTEMS    (2-9 systems for level 4, 10 or more for level 5)     Review of Systems   Constitutional: Negative for chills and fever. HENT: Negative for congestion. Respiratory: Negative for shortness of breath.     Cardiovascular: Negative for chest pain and palpitations. Gastrointestinal: Negative for abdominal pain, diarrhea and vomiting. Neurological: Positive for dizziness and syncope. All other systems reviewed and are negative.            PAST MEDICALHISTORY     Past Medical History:   Diagnosis Date    Dizziness     H/O thyroidectomy 2015    2 non-malginant masses    History of mitral valve repair     Hypothyroidism     [facility]up[facility] ([facility] [address]     Migraine     Myasthenia gravis ([facility] [facility]     PONV (postoperative nausea and vomiting)     TIA (transient ischemic attack) 2006         SURGICAL HISTORY       Past Surgical History:   Procedure Laterality Date    BACK SURGERY      CARDIAC CATHETERIZATION  2006    MVP    CORONARY ARTERY BYPASS GRAFT  2006    HAND TENDON SURGERY Right 3/29/2019    MCP JOINT RIGHT HAND performed by Davie Pereira DO at [address] [place] [address] THYROIDECTOMY  2014    TONSILLECTOMY           CURRENT MEDICATIONS     Previous Medications    LOSARTAN (COZAAR) 50 MG TABLET    Take 0.5 tablets by mouth 2 times daily    PREDNISONE (DELTASONE) 10 MG TABLET    Take 10 mg every other day; 15 mg days in between    SYNTHROID 25 MCG TABLET    50 mcg        ALLERGIES     Lisinopril, Cellcept [mycophenolate], Hydrochlorothiazide, Hydroxychloroquine sulfate, Maxalt [rizatriptan], and Statins [statins]    FAMILY HISTORY       Family History   Problem Relation Age of Onset    Diabetes Sister         passed away due to Diabetes    Heart Disease Brother     High Cholesterol Brother     High Blood Pressure Brother     High Blood Pressure Sister     High Blood Pressure Sister     High Blood Pressure Brother     High Cholesterol Brother     High Blood Pressure Brother     High Cholesterol Brother     Cancer Brother     High Blood Pressure Brother     High Cholesterol Brother     Cancer Brother     High Blood Pressure Brother     High Cholesterol Brother           SOCIAL HISTORY       Social History     Socioeconomic History    Marital status:  Spouse name: None    Number of children: None    Years of education: None    Highest education level: None   Occupational History    None   Tobacco Use    Smoking status: Former Smoker     Packs/day: 0.00     Years: 0.00     Pack years: 0.00     Types: Cigarettes    Smokeless tobacco: Never Used   Vaping Use    Vaping Use: Never used   Substance and Sexual Activity    Alcohol use: Not Currently    Drug use: Never    Sexual activity: None   Other Topics Concern    None   Social History Narrative    None     Social Determinants of Health     Financial Resource Strain:     Difficulty of Paying Living Expenses: Not on file   Food Insecurity:     Worried About Running Out of Food in the Last Year: Not on file    Briseida of Food in the Last Year: Not on file   Transportation Needs:     Lack of Transportation (Medical): Not on file    Lack of Transportation (Non-Medical):  Not on file   Physical Activity:     Days of Exercise per Week: Not on file    Minutes of Exercise per Session: Not on file   Stress:     Feeling of Stress : Not on file   Social Connections:     Frequency of Communication with Friends and Family: Not on file    Frequency of Social Gatherings with Friends and Family: Not on file    Attends Rastafarian Services: Not on file    Active Member of [address] [place] [address] [employer] or Organizations: Not on file    Attends Club or Organization Meetings: Not on file    Marital Status: Not on file   Intimate Partner Violence:     Fear of Current or Ex-Partner: Not on file    Emotionally Abused: Not on file    Physically Abused: Not on file    Sexually Abused: Not on file   Housing Stability:     Unable to Pay for Housing in the Last Year: Not on file    Number of [place] in the Last Year: Not on file    Unstable Housing in the Last Year: Not on file       SCREENINGS             PHYSICAL EXAM    (up to 7 for level 4, 8 or more for level 5)     ED Triage Vitals [01/08/22 6571]   BP Temp Temp Source Pulse Resp SpO2 Height Weight   (!) 151/72 97.8 °F (36.6 °C) Oral 76 19 100 % 5' 4\" (1.626 m) 75 lb (34 kg)       Physical Exam  Vitals and nursing note reviewed. Constitutional:       General: She is not in acute distress. Appearance: She is underweight. HENT:      Head: Atraumatic. Mouth/Throat:      Mouth: Mucous membranes are moist. Mucous membranes are not dry. Eyes:      General: No scleral icterus. Pupils: Pupils are equal, round, and reactive to light. Neck:      Trachea: No tracheal deviation. Cardiovascular:      Rate and Rhythm: Normal rate and regular rhythm. Heart sounds: Normal heart sounds. No murmur heard. Pulmonary:      Effort: Pulmonary effort is normal. No respiratory distress. Breath sounds: Normal breath sounds. No stridor. Abdominal:      General: There is no distension. Palpations: Abdomen is soft. Tenderness: There is no abdominal tenderness. There is no guarding. Musculoskeletal:      Right shoulder: No tenderness. Left shoulder: No tenderness. Right wrist: No tenderness. Left wrist: No tenderness. Right hip: No tenderness. Left hip: No tenderness. Right lower leg: No edema. Left lower leg: No edema. Skin:     Capillary Refill: Capillary refill takes less than 2 seconds. Coloration: Skin is not pale. Findings: No rash. Neurological:      Mental Status: She is alert and oriented to person, place, and time. Psychiatric:         Behavior: Behavior is cooperative. DIAGNOSTIC RESULTS     EKG: All EKG's areinterpreted by the Emergency Department Physician who either signs or Co-signs this chart in the absence of a cardiologist.    0206: Normal sinus rhythm at a rate of 77, there is no evidence of acute ST elevation. There is noted to be T wave inversions in lead V5 and V6. QTc: 550 MS.     RADIOLOGY:  Non-plain film images such as CT, Ultrasound and MRI are read by the radiologist. Bismark Malone radiographic images are visualized and preliminarily interpreted bythe emergency physician with the below findings:    CT HEAD:    No acute intracranial hemorrhage, herniation, or hydrocephalus. No calvarial or skull base fractures. Chronic microvascular ischemic changes of the periventricular white matter. XR CHEST PORTABLE    (Results Pending)   CT HEAD WO CONTRAST    (Results Pending)       CT HEAD:    No acute intracranial hemorrhage, herniation, or hydrocephalus. No calvarial or skull base fractures. Chronic microvascular ischemic changes of the periventricular white matter.     CXR: No infiltrates    LABS:  Labs Reviewed   COVID-19, RAPID - Abnormal; Notable for the following components:       Result Value    SARS-CoV-2, NAAT DETECTED (*)     All other components within normal limits    Narrative:     CALL  [employer] ,  Microbiology results called to and read back by Celia Govea RN in ED,  01/08/2022 02:59, by [facility]   CBC WITH AUTO DIFFERENTIAL - Abnormal; Notable for the following components:    WBC 3.8 (*)     RBC 3.76 (*)     Hemoglobin 11.6 (*)     Hematocrit 36.7 (*)     MCHC 31.6 (*)     Neutrophils % 70.2 (*)     Lymphocytes Absolute 0.8 (*)     All other components within normal limits   COMPREHENSIVE METABOLIC PANEL - Abnormal; Notable for the following components:    Sodium 131 (*)     Chloride 96 (*)     Glucose 115 (*)     Calcium 8.3 (*)     Total Protein 5.6 (*)     AST 50 (*)     All other components within normal limits   TROPONIN - Abnormal; Notable for the following components:    Troponin 0.21 (*)     All other components within normal limits    Narrative:     CALL  [employer] tel. ,  Chemistry results called to and read back by jose milan rn/er, 01/08/2022  02:43, by [facility] [place]  Chemistry results called to and read back by jose milan rn/er, 01/08/2022  02:42, by [employer] - Abnormal; Notable for the following components:    Pro-BNP 3,339 (*)     All other components within normal limits   TROPONIN   TROPONIN   TROPONIN       All other labs were within normal range or not returned as of this dictation. EMERGENCY DEPARTMENT COURSE and DIFFERENTIAL DIAGNOSIS/MDM:   Vitals:    Vitals:    01/08/22 0204   BP: (!) 151/72   Pulse: 76   Resp: 19   Temp: 97.8 °F (36.6 °C)   TempSrc: Oral   SpO2: 100%   Weight: 75 lb (34 kg)   Height: 5' 4\" (1.626 m)       MDM    Reassessment    Patient's initial cardiac biomarker is slightly elevated at 0.21. She is not currently having any acute chest pain. Her EKG does not reveal any evidence of ST elevation. CT scan of the brain is unremarkable. Patient did test positive for COVID-19 infection. States that she is previously unvaccinated. She has not had any symptoms of Covid infection such as cough or shortness of breath, fever or loss of taste or smell. CONSULTS:    Case was discussed with Dr. Carlisle Bosworth regarding patient admission to the hospitalist service. PROCEDURES:  Unless otherwise noted below, none     Procedures    FINAL IMPRESSION      1. Chest pain, unspecified type    2. Lab test positive for detection of COVID-19 virus    3. Dizziness    4.  Closed head injury, initial encounter          DISPOSITION/PLAN   DISPOSITION Admitted 01/08/2022 05:55:24 AM      (Please note that portions of this note were completed with a voice recognition program.  Efforts were made to edit thedictations but occasionally words are mis-transcribed.)    Rhianna Fowler MD (electronically signed)  Attending Emergency Physician          Rhianna Fowler MD  01/08/22 2635

## 2022-01-08 NOTE — CONSULTS
[facility]) Cardiology   Consult Note   Rachana Kendall       Requesting MD:  Mellissa Alexandra MD   Admit Status:  Inpatient [101]       History obtained from:   [x] Patient  [] Other (specify):     Patient:  Abby Serrano  [id number]     Chief Complaint:   Chief Complaint   Patient presents with    Chest Pain     pain in chest x1 month. Pt states worse tonight. HPI: Ms. Mone Ontiveros is a 64 y.o. female with a history of valvular disease status post mitral valve surgical repair, history of hypothyroidism, history of systemic lupus erythematosus, is migraine, significance, history of TIA, history of CABG in 2006     Admit hospital after she is having episodes of chest pain, Chest Pain Relieved by nitroglycerin, patient surgical history dizziness and syncope    Her COVID test came positive, she reports feeling flue like symptoms for one week   EKG came back abnormal with T wave inversion and ST depression, troponin elevated    Patient was diagnosed with NSTEMI and cardiology consult for further management      Review of Systems:  Review of Systems   Constitutional: Positive for fatigue. Respiratory: Positive for chest tightness and shortness of breath. Cardiovascular: Positive for chest pain. Gastrointestinal: Negative. Endocrine: Negative. Genitourinary: Negative. Musculoskeletal: Negative. Skin: Negative. Neurological: Positive for dizziness and syncope. Hematological: Negative. All other systems reviewed and are negative.       Cardiac Specific Data:  Specialty Problems        Cardiology Problems    Mitral valve prolapse        * (Principal) Chest pain due to coronary artery disease (HCC)        Chest pain              Past Medical History:  Past Medical History:   Diagnosis Date    Dizziness     H/O thyroidectomy 2015    2 non-malginant masses    History of mitral valve repair     Hypothyroidism     Lupus (HCC)     Migraine     Myasthenia gravis ([address]     PONV (postoperative nausea and vomiting)     TIA (transient ischemic attack) 2006        Past Surgical History:  Past Surgical History:   Procedure Laterality Date    BACK SURGERY      CARDIAC CATHETERIZATION  2006    MVP    CORONARY ARTERY BYPASS GRAFT  2006    HAND TENDON SURGERY Right 3/29/2019    MCP JOINT RIGHT HAND [facility]t DO [facility] THYROIDECTOMY  2014    TONSILLECTOMY         Past Family History:  Family History   Problem Relation Age of Onset    Diabetes Sister         passed away due to Diabetes    Heart Disease Brother     High Cholesterol Brother     High Blood Pressure Brother     High Blood Pressure Sister     High Blood Pressure Sister     High Blood Pressure Brother     High Cholesterol Brother     High Blood Pressure Brother     High Cholesterol Brother     Cancer Brother     High Blood Pressure Brother     High Cholesterol Brother     Cancer Brother     High Blood Pressure Brother     High Cholesterol Brother        Past Social History:  Social History     Socioeconomic History    Marital status:      Spouse name: Not on file    Number of children: Not on file    Years of education: Not on file    Highest education level: Not on file   Occupational History    Not on file   Tobacco Use    Smoking status: Former Smoker     Packs/day: 0.00     Years: 0.00     Pack years: 0.00     Types: Cigarettes    Smokeless tobacco: Never Used   Vaping Use    Vaping Use: Never used   Substance and Sexual Activity    Alcohol use: Not Currently    Drug use: Never    Sexual activity: Not on file   Other Topics Concern    Not on file   Social History Narrative    Not on file     Social Determinants of Health     Financial Resource Strain:     Difficulty of Paying Living Expenses: Not on file   Food Insecurity:     Worried About Running Out of Food in the Last Year: Not on file    Briseida of Food in the Last Year: Not on file   Transportation Needs:     Lack of Transportation (Medical): Not on file    Lack of Transportation (Non-Medical): Not on file   Physical Activity:     Days of Exercise per Week: Not on file    Minutes of Exercise per Session: Not on file   Stress:     Feeling of Stress : Not on file   Social Connections:     Frequency of Communication with Friends and Family: Not on file    Frequency of Social Gatherings with Friends and Family: Not on file    Attends Judaism Services: Not on file    Active Member of [address] [place] [address] [employer] or Organizations: Not on file    Attends Club or Organization Meetings: Not on file    Marital Status: Not on file   Intimate Partner Violence:     Fear of Current or Ex-Partner: Not on file    Emotionally Abused: Not on file    Physically Abused: Not on file    Sexually Abused: Not on file   Housing Stability:     Unable to Pay for Housing in the Last Year: Not on file    Number of [place] in the Last Year: Not on file    Unstable Housing in the Last Year: Not on file       Allergies: Allergies   Allergen Reactions    Lisinopril Anaphylaxis     Tongue swelling    Cellcept [Mycophenolate]     Hydrochlorothiazide     Hydroxychloroquine Sulfate     Maxalt [Rizatriptan] Hives    Statins [Statins]        Home Meds:  Prior to Admission medications    Medication Sig Start Date End Date Taking?  Authorizing Provider   losartan (COZAAR) 50 MG tablet Take 0.5 tablets by mouth 2 times daily 9/9/21  Yes Ede Shultz MD   SYNTHROID 25 MCG tablet 50 mcg  7/29/20  Yes Historical Provider, MD   predniSONE (DELTASONE) 10 MG tablet Take 10 mg every other day; 15 mg days in between   Yes Historical Provider, MD       Current Meds:  Gema Sissy by provider] losartan  25 mg Oral BID    sodium chloride flush  5-40 mL IntraVENous 2 times per day    [START ON 1/9/2022] aspirin  81 mg Oral Daily    metoprolol succinate  25 mg Oral Daily    enoxaparin  1 mg/kg SubCUTAneous BID    Vitamin D  2,000 Units Oral Daily    levothyroxine  25 mcg Oral Daily    predniSONE  40 mg Oral Daily    fluvoxaMINE  50 mg Oral BID    melatonin  10 mg Oral Nightly       Current Infused Meds:   sodium chloride         Physical Exam:  Vitals:    01/08/22 0754   BP: (!) 144/74   Pulse: 66   Resp: 21   Temp: 97.6 °F (36.4 °C)   SpO2: 99%     No intake or output data in the 24 hours ending 01/08/22 1155  Estimated body mass index is 12.87 kg/m² as calculated from the following:    Height as of this encounter: 5' 4\" (1.626 m). Weight as of this encounter: 75 lb (34 kg). Physical Exam  Vitals and nursing note reviewed. Constitutional:       Appearance: Normal appearance. HENT:      Head: Normocephalic and atraumatic. Mouth/Throat:      Mouth: Mucous membranes are moist.   Cardiovascular:      Rate and Rhythm: Normal rate and regular rhythm. Pulses: Normal pulses. Heart sounds: Normal heart sounds. No murmur heard. Pulmonary:      Effort: Pulmonary effort is normal.      Breath sounds: Normal breath sounds. Abdominal:      General: Bowel sounds are normal.      Palpations: Abdomen is soft. Tenderness: There is no abdominal tenderness. Musculoskeletal:         General: Normal range of motion. Right lower leg: No edema. Left lower leg: No edema. Skin:     General: Skin is warm. Neurological:      General: No focal deficit present. Mental Status: She is alert and oriented to person, place, and time.    Psychiatric:         Mood and Affect: Mood normal.         Behavior: Behavior normal.         Labs:  Recent Labs     01/08/22 0212   WBC 3.8*   HGB 11.6*          Recent Labs     01/08/22 0212   *   K 3.9   CL 96*   CO2 25   BUN 20   CREATININE 0.8   LABGLOM >60   CALCIUM 8.3*       CK, CKMB, Troponin:   Recent Labs     01/08/22 0212 01/08/22  0609   TROPONINI 0.21* 0.16*       Last 3 BNP:  Recent Labs     01/08/22 0212   PROBNP 3,339*         IMAGING:    EKG -    Narrative & Impression    63 BPM  Sinus rhythm with PACs  Possible left atrial abnormality  Cannot rule out anteroseptal infarct - age undetermined  Serial changes compared to previous ECG  LVH with secondary repolarization abnormality  Inferior/lateral ST-T abnormality may be due to hypertrophy and/or ischemia  Comparison Summary: Significant changes     ECHO -pending      CT HEAD WO CONTRAST    Result Date: 1/8/2022  Mild cerebral and cerebellar volume loss with chronic microvascular disease but no evidence of acute intracranial process. These images initially reviewed by stat rad 3:27 AM Signed by Dr Chris Danielle    XR CHEST PORTABLE    Result Date: 1/8/2022  1. Moderate cardiomegaly no evidence pulmonary vascular congestion. Signed by Dr Isabel Luis and Plan:  1. NSTEMI -clinically stable as her chest pain has improved, she has h/o CABG and MVR, will continue medical therapy and plan for cardiac cath tomorrow morning, patient agreed to proceed   2. COVID-19 infection per primary team      Risks, benefits, alternatives of Heart cath/PCI discussed with the patient   I explained the procedure to the patient in detail and fully informed consent obtained. Acceptable Mallampati score  Consent for moderate conscious sedation  ASA 3      Thank you for the consult, we appreciate the opportunity to provide care to your patients. Feel free to contact me if I can be of any further assistance.       Electronically signed by Archana Francois MD on 1/8/2022 at 11:55 AM    Archana Francois MD, [facility], [facility]  Interventional Cardiologist, Endovascular Specialist   Medical Director, Structural Heart Program   [facility]       (Please note that portions of this note were completed with a voice recognition program.  Effortswere made to edit the dictations but occasionally words are mis-transcribed.)

## 2022-01-08 NOTE — ED TRIAGE NOTES
Pt presents by Bridget Rees. Per [employer] they were called for a scene flight for chest pain. Ground EMS had given pt 1 Nitro prior to their arrival. Upon their arrival pt was unresponsive with a GCS of 5. [facility] gave pt 300 mL fluid bolus and pt became responsive and stated that she felt dizzy. [facility] gave pt 4 81mg Aspirin. Pt reports chest pain x1 month but worsened tonight. Pt states that she has a cough.

## 2022-01-08 NOTE — CONSULTS
Comprehensive Nutrition Assessment    Type and Reason for Visit:  Initial,Consult    Nutrition Recommendations/Plan: modify current ONS to Ensure Clear d/t clear liquid diet order    Nutrition Assessment:  Received consult for decreased weight. Did not visit pt as pt is still in ER and COVID isolation. As per pt's weight--pt meets criteria for severe malnutrition. Receiving clear liquid diet. Modified ONS to agree with current diet order    Malnutrition Assessment:  Malnutrition Status:  Severe malnutrition    Context:  Chronic Illness     Findings of the 6 clinical characteristics of malnutrition:  Energy Intake:  Unable to assess  Weight Loss:  No significant weight loss     Body Fat Loss:  Unable to assess     Muscle Mass Loss:  Unable to assess    Fluid Accumulation:  No significant fluid accumulation     Strength:  Not Performed    Estimated Daily Nutrient Needs:  Energy (kcal):  8805-8046 kcals (30-35 kcals/kg); Weight Used for Energy Requirements:  Current     Protein (g):  51g; Weight Used for Protein Requirements:  Current        Fluid (ml/day):  0892-7314 ml; Method Used for Fluid Requirements:  1 ml/kcal      Nutrition Related Findings:  very thin      Wounds:  None       Current Nutrition Therapies:    ADULT DIET; Clear Liquid;  No Caffeine  ADULT ORAL NUTRITION SUPPLEMENT; Breakfast, AM Snack, Lunch, PM Snack, Dinner, HS Snack; Clear Liquid Oral Supplement    Anthropometric Measures:  · Height: 5' 4\" (162.6 cm)  · Current Body Weight: 75 lb (34 kg)   · Admission Body Weight: 75 lb (34 kg)    · Usual Body Weight: 78 lb (35.4 kg) (7/2021)     · Ideal Body Weight: 120 lbs; % Ideal Body Weight 62.5 %   · BMI: 12.9  · Adjusted Body Weight:  ; No Adjustment   · BMI Categories: Underweight (BMI less than 22) age over 72       Nutrition Diagnosis:   · Inadequate protein-energy intake,Severe malnutrition related to early satiety as evidenced by BMI,NPO or clear liquid status due to medical condition      Nutrition Interventions:   Food and/or Nutrient Delivery:  Continue Current Diet,Modify Oral Nutrition Supplement  Nutrition Education/Counseling:  No recommendation at this time   Coordination of Nutrition Care:  Continue to monitor while inpatient    Goals:  diet advanced with good tolerance. PO intake 50% or greater. Weight increase 1-3# per week       Nutrition Monitoring and Evaluation:   Behavioral-Environmental Outcomes:      Food/Nutrient Intake Outcomes:  Diet Advancement/Tolerance,Food and Nutrient Intake,Supplement Intake  Physical Signs/Symptoms Outcomes:  Biochemical Data,Chewing or Swallowing,Weight,Skin,Nutrition Focused Physical Findings,Fluid Status or Edema     Discharge Planning:     Too soon to determine     Electronically signed by Shirley Lester MS, RD, LD on 1/8/22 at 7:39 AM CST    Contact: [phone]

## 2022-01-08 NOTE — ED NOTES
Patient placed on cardiac monitor, continuous pulse oximeter, and NIBP monitor. Monitor alarms on.          Jun Soto RN  01/08/22 [id number]

## 2022-01-08 NOTE — H&P
[place], [place], [address]    DEPARTMENT OF HOSPITALIST MEDICINE    HISTORY AND PHYSICAL             Date: 1/8/2022        Patient Name: Isidra Wagner     YOB: 1945      Age:  64 y.o. Chief Complaint     Chief Complaint   Patient presents with    Chest Pain     pain in chest x1 month. Pt states worse tonight. History Obtained From   ER MD, EHR, daughter    History of Present Illness   64 F with history of multiple co-morbidities unvaccinated and now with COVID-19. Pt presents via EMS in [facility] medical transport for evaluation of chest pain, dizziness and a fall. Patient states that she woke up this evening feeling somewhat dizzy. Family apparently stated she was stumbling, fell against the wall and then fell to the ground. Daughter reports this is been ongoing for the past few months and that she has not been eating. When ground EMS arrived they gave her a sublingual nitro. Patient subsequently became hypotensive, diaphoretic and pale. Aeromedical transport team arrived, patient was noted to be altered with hypotension. She was given IV fluid bolus with subsequent improvement in her blood pressure and mental status. On arrival to the ED patient is alert and speaking.       Past Medical History     Past Medical History:   Diagnosis Date    Dizziness     H/O thyroidectomy 2015    2 non-malginant masses    History of mitral valve repair     Hypothyroidism     Lupus ([address]     Migraine     Myasthenia gravis ([address]     PONV (postoperative nausea and vomiting)     TIA (transient ischemic attack) 2006        Past Surgical History     Past Surgical History:   Procedure Laterality Date    BACK SURGERY      CARDIAC CATHETERIZATION  2006    MVP    CORONARY ARTERY BYPASS GRAFT  2006    HAND TENDON SURGERY Right 3/29/2019    MCP JOINT RIGHT HAND performed by Gosia Burks DO at [address] [place] [address]  2014    TONSILLECTOMY          Medications     Prior to Admission medications    Medication Sig Start Date End Date Taking? Authorizing Provider   losartan (COZAAR) 50 MG tablet Take 0.5 tablets by mouth 2 times daily 9/9/21  Yes Jimmy Rivera MD   SYNTHROID 25 MCG tablet 50 mcg  7/29/20  Yes Historical Provider, MD   predniSONE (DELTASONE) 10 MG tablet Take 10 mg every other day; 15 mg days in between   Yes Historical Provider, MD        Allergies   Lisinopril, Cellcept [mycophenolate], Hydrochlorothiazide, Hydroxychloroquine sulfate, Maxalt [rizatriptan], and Statins [statins]    Social History     Social History     Tobacco History     Smoking Status  Former Smoker Smoking Frequency  0 packs/day for 0 years (0 pk yrs) Smoking Tobacco Type  Cigarettes    Smokeless Tobacco Use  Never Used          Alcohol History     Alcohol Use Status  Not Currently          Drug Use     Drug Use Status  Never          Sexual Activity     Sexually Active  Not Asked                Family History     Family History   Problem Relation Age of Onset    Diabetes Sister         passed away due to Diabetes    Heart Disease Brother     High Cholesterol Brother     High Blood Pressure Brother     High Blood Pressure Sister     High Blood Pressure Sister     High Blood Pressure Brother     High Cholesterol Brother     High Blood Pressure Brother     High Cholesterol Brother     Cancer Brother     High Blood Pressure Brother     High Cholesterol Brother     Cancer Brother     High Blood Pressure Brother     High Cholesterol Brother        Review of Systems   Review of Systems   Constitutional: Positive for appetite change and fatigue. Negative for fever. HENT: Negative. Eyes: Negative. Respiratory: Negative. Cardiovascular: Positive for chest pain. Negative for palpitations and leg swelling. Gastrointestinal: Negative. Genitourinary: Negative. Musculoskeletal: Negative. Skin: Negative. Neurological: Positive for dizziness, syncope and light-headedness. All other systems reviewed and are negative. Physical Exam   BP (!) 151/72   Pulse 76   Temp 97.8 °F (36.6 °C) (Oral)   Resp 19   Ht 5' 4\" (1.626 m)   Wt 75 lb (34 kg)   SpO2 100%   BMI 12.87 kg/m²     Physical Exam  Vitals and nursing note reviewed. Exam conducted with a chaperone present. Constitutional:       General: She is not in acute distress. Appearance: She is ill-appearing. Comments: Severely underweight and malnourished appearing. Cardiovascular:      Rate and Rhythm: Normal rate and regular rhythm. Pulses: Normal pulses. Pulmonary:      Effort: Pulmonary effort is normal.      Breath sounds: Normal breath sounds. Abdominal:      General: Abdomen is flat. Palpations: Abdomen is soft. Musculoskeletal:         General: No tenderness. Normal range of motion. Cervical back: Neck supple. Skin:     General: Skin is warm. Capillary Refill: Capillary refill takes 2 to 3 seconds. Neurological:      General: No focal deficit present. Mental Status: She is alert and oriented to person, place, and time.    Psychiatric:      Comments: Depressed mood          Labs      Recent Results (from the past 24 hour(s))   CBC Auto Differential    Collection Time: 01/08/22  2:12 AM   Result Value Ref Range    WBC 3.8 (L) 4.8 - 10.8 K/uL    RBC 3.76 (L) 4.20 - 5.40 M/uL    Hemoglobin 11.6 (L) 12.0 - 16.0 g/dL    Hematocrit 36.7 (L) 37.0 - 47.0 %    MCV 97.6 81.0 - 99.0 fL    MCH 30.9 27.0 - 31.0 pg    MCHC 31.6 (L) 33.0 - 37.0 g/dL    RDW 14.1 11.5 - 14.5 %    Platelets 099 701 - 152 K/uL    MPV 9.9 9.4 - 12.3 fL    Neutrophils % 70.2 (H) 50.0 - 65.0 %    Lymphocytes % 21.8 20.0 - 40.0 %    Monocytes % 6.9 0.0 - 10.0 %    Eosinophils % 0.5 0.0 - 5.0 %    Basophils % 0.3 0.0 - 1.0 %    Neutrophils Absolute 2.7 1.5 - 7.5 K/uL    Immature Granulocytes # 0.0 K/uL    Lymphocytes Absolute 0.8 (L) 1.1 - 4.5 K/uL    Monocytes Absolute 0.30 0.00 - 0.90 K/uL    Eosinophils Absolute 0.00 0.00 - 0.60 K/uL    Basophils Absolute 0.00 0.00 - 0.20 K/uL   Comprehensive Metabolic Panel    Collection Time: 01/08/22  2:12 AM   Result Value Ref Range    Sodium 131 (L) 136 - 145 mmol/L    Potassium 3.9 3.5 - 5.0 mmol/L    Chloride 96 (L) 98 - 111 mmol/L    CO2 25 22 - 29 mmol/L    Anion Gap 10 7 - 19 mmol/L    Glucose 115 (H) 74 - 109 mg/dL    BUN 20 8 - 23 mg/dL    CREATININE 0.8 0.5 - 0.9 mg/dL    GFR Non-African American >60 >60    GFR African American >59 >59    Calcium 8.3 (L) 8.8 - 10.2 mg/dL    Total Protein 5.6 (L) 6.6 - 8.7 g/dL    Albumin 3.5 3.5 - 5.2 g/dL    Total Bilirubin 0.3 0.2 - 1.2 mg/dL    Alkaline Phosphatase 75 35 - 104 U/L    ALT 16 5 - 33 U/L    AST 50 (H) 5 - 32 U/L   Troponin    Collection Time: 01/08/22  2:12 AM   Result Value Ref Range    Troponin 0.21 (HH) 0.00 - 0.03 ng/mL   Brain Natriuretic Peptide    Collection Time: 01/08/22  2:12 AM   Result Value Ref Range    Pro-BNP 3,339 (H) 0 - 1,800 pg/mL   COVID-19, Rapid    Collection Time: 01/08/22  2:12 AM    Specimen: Nasopharyngeal Swab   Result Value Ref Range    SARS-CoV-2, NAAT DETECTED (AA) Not Detected        Imaging/Diagnostics Last 24 Hours   EKG 0206: Normal sinus rhythm  rate of 77, there is no acute ST elevation. There is noted to be T wave inversions in lead V5 and V6.       RADIOLOGY:    CT HEAD:     No acute intracranial hemorrhage, herniation, or hydrocephalus. No calvarial or skull base fractures. Chronic microvascular ischemic changes of the periventricular white matter.     CXR: No infiltrates      Assessment    Principal Problem:    Unresponsiveness    COVID w multiple comorbidities  Active Problems:    Chest pain due to coronary artery disease (HCC)    Shortness of breath    Depression    Severe malnutrition (HCC)    Weight loss, non-intentional    Lupus (systemic lupus erythematosus) ([facility]    Plan   1. COVID treatment and isolation  2. Patient wishes to be DNR  3.  Nutritional supplementation  4. Consults for chronic disease management   5. Chest pain monitoring and management  6.  Echo, EKG, Troponin    Consultations Ordered:  IP CONSULT TO CARDIOLOGY  IP CONSULT TO PALLIATIVE CARE  IP CONSULT TO DIETITIAN  IP CONSULT TO RHEUMATOLOGY    Electronically signed by     Shakila Russell MD, FAAFP  Hospitalist    on 1/8/2022 6:03 AM

## 2022-01-08 NOTE — ED NOTES
Bed: 05  Expected date:   Expected time:   Means of arrival:   Comments:  Niall Avila RN  01/08/22 6472

## 2022-01-09 ENCOUNTER — APPOINTMENT (OUTPATIENT)
Dept: CT IMAGING | Age: 77
DRG: 246 | End: 2022-01-09
Payer: MEDICARE

## 2022-01-09 PROBLEM — R57.8 HEMORRHAGIC SHOCK (HCC): Status: ACTIVE | Noted: 2022-01-09

## 2022-01-09 LAB
ABO/RH: NORMAL
ADENOVIRUS BY PCR: NOT DETECTED
ALBUMIN SERPL-MCNC: 2.7 G/DL (ref 3.5–5.2)
ALBUMIN SERPL-MCNC: 2.9 G/DL (ref 3.5–5.2)
ALP BLD-CCNC: 54 U/L (ref 35–104)
ALP BLD-CCNC: 61 U/L (ref 35–104)
ALT SERPL-CCNC: 11 U/L (ref 5–33)
ALT SERPL-CCNC: 11 U/L (ref 5–33)
ANION GAP SERPL CALCULATED.3IONS-SCNC: 10 MMOL/L (ref 7–19)
ANION GAP SERPL CALCULATED.3IONS-SCNC: 9 MMOL/L (ref 7–19)
ANTIBODY SCREEN: NORMAL
APTT: >200 SEC (ref 26–36.2)
AST SERPL-CCNC: 27 U/L (ref 5–32)
AST SERPL-CCNC: 32 U/L (ref 5–32)
BILIRUB SERPL-MCNC: 0.3 MG/DL (ref 0.2–1.2)
BILIRUB SERPL-MCNC: <0.2 MG/DL (ref 0.2–1.2)
BLOOD BANK DISPENSE STATUS: NORMAL
BLOOD BANK PRODUCT CODE: NORMAL
BORDETELLA PARAPERTUSSIS BY PCR: NOT DETECTED
BORDETELLA PERTUSSIS BY PCR: NOT DETECTED
BPU ID: NORMAL
BUN BLDV-MCNC: 31 MG/DL (ref 8–23)
BUN BLDV-MCNC: 32 MG/DL (ref 8–23)
C-REACTIVE PROTEIN: 1.02 MG/DL (ref 0–0.5)
CALCIUM SERPL-MCNC: 8.1 MG/DL (ref 8.8–10.2)
CALCIUM SERPL-MCNC: 8.1 MG/DL (ref 8.8–10.2)
CHLAMYDOPHILIA PNEUMONIAE BY PCR: NOT DETECTED
CHLORIDE BLD-SCNC: 96 MMOL/L (ref 98–111)
CHLORIDE BLD-SCNC: 98 MMOL/L (ref 98–111)
CO2: 23 MMOL/L (ref 22–29)
CO2: 24 MMOL/L (ref 22–29)
CORONAVIRUS 229E BY PCR: NOT DETECTED
CORONAVIRUS HKU1 BY PCR: NOT DETECTED
CORONAVIRUS NL63 BY PCR: NOT DETECTED
CORONAVIRUS OC43 BY PCR: NOT DETECTED
CREAT SERPL-MCNC: 1 MG/DL (ref 0.5–0.9)
CREAT SERPL-MCNC: 1 MG/DL (ref 0.5–0.9)
D DIMER: 2.24 UG/ML FEU (ref 0–0.48)
DESCRIPTION BLOOD BANK: NORMAL
GFR AFRICAN AMERICAN: >59
GFR AFRICAN AMERICAN: >59
GFR NON-AFRICAN AMERICAN: 54
GFR NON-AFRICAN AMERICAN: 54
GLUCOSE BLD-MCNC: 127 MG/DL (ref 74–109)
GLUCOSE BLD-MCNC: 98 MG/DL (ref 74–109)
HCT VFR BLD CALC: 28.5 % (ref 37–47)
HCT VFR BLD CALC: 32.6 % (ref 37–47)
HCT VFR BLD CALC: 35.1 % (ref 37–47)
HCT VFR BLD CALC: 37.8 % (ref 37–47)
HEMOGLOBIN: 10.6 G/DL (ref 12–16)
HEMOGLOBIN: 10.8 G/DL (ref 12–16)
HEMOGLOBIN: 11.3 G/DL (ref 12–16)
HEMOGLOBIN: 9.2 G/DL (ref 12–16)
HUMAN METAPNEUMOVIRUS BY PCR: NOT DETECTED
HUMAN RHINOVIRUS/ENTEROVIRUS BY PCR: NOT DETECTED
INFLUENZA A BY PCR: NOT DETECTED
INFLUENZA B BY PCR: NOT DETECTED
INR BLD: 1.13 (ref 0.88–1.18)
LACTIC ACID: 2.4 MMOL/L (ref 0.5–1.9)
MCH RBC QN AUTO: 31.5 PG (ref 27–31)
MCH RBC QN AUTO: 31.6 PG (ref 27–31)
MCHC RBC AUTO-ENTMCNC: 32.3 G/DL (ref 33–37)
MCHC RBC AUTO-ENTMCNC: 32.5 G/DL (ref 33–37)
MCV RBC AUTO: 96.7 FL (ref 81–99)
MCV RBC AUTO: 97.9 FL (ref 81–99)
MYCOPLASMA PNEUMONIAE BY PCR: NOT DETECTED
PARAINFLUENZA VIRUS 1 BY PCR: NOT DETECTED
PARAINFLUENZA VIRUS 2 BY PCR: NOT DETECTED
PARAINFLUENZA VIRUS 3 BY PCR: NOT DETECTED
PARAINFLUENZA VIRUS 4 BY PCR: NOT DETECTED
PDW BLD-RTO: 13.8 % (ref 11.5–14.5)
PDW BLD-RTO: 13.9 % (ref 11.5–14.5)
PLATELET # BLD: 150 K/UL (ref 130–400)
PLATELET # BLD: 150 K/UL (ref 130–400)
PMV BLD AUTO: 10.3 FL (ref 9.4–12.3)
PMV BLD AUTO: 10.3 FL (ref 9.4–12.3)
POC ACT LR: 385 SEC
POTASSIUM REFLEX MAGNESIUM: 4.3 MMOL/L (ref 3.5–5)
POTASSIUM REFLEX MAGNESIUM: 4.4 MMOL/L (ref 3.5–5)
PROTHROMBIN TIME: 14.7 SEC (ref 12–14.6)
RBC # BLD: 2.91 M/UL (ref 4.2–5.4)
RBC # BLD: 3.37 M/UL (ref 4.2–5.4)
RESPIRATORY SYNCYTIAL VIRUS BY PCR: NOT DETECTED
SARS-COV-2, PCR: DETECTED
SODIUM BLD-SCNC: 129 MMOL/L (ref 136–145)
SODIUM BLD-SCNC: 131 MMOL/L (ref 136–145)
TOTAL PROTEIN: 4.6 G/DL (ref 6.6–8.7)
TOTAL PROTEIN: 4.6 G/DL (ref 6.6–8.7)
WBC # BLD: 3.3 K/UL (ref 4.8–10.8)
WBC # BLD: 5.9 K/UL (ref 4.8–10.8)

## 2022-01-09 PROCEDURE — 85018 HEMOGLOBIN: CPT

## 2022-01-09 PROCEDURE — B2151ZZ FLUOROSCOPY OF LEFT HEART USING LOW OSMOLAR CONTRAST: ICD-10-PCS | Performed by: INTERNAL MEDICINE

## 2022-01-09 PROCEDURE — 92929 PR PRQ TRLUML CORONARY STENT W/ANGIO ADDL ART/BRNCH: CPT | Performed by: INTERNAL MEDICINE

## 2022-01-09 PROCEDURE — 2100000000 HC CCU R&B

## 2022-01-09 PROCEDURE — 86901 BLOOD TYPING SEROLOGIC RH(D): CPT

## 2022-01-09 PROCEDURE — C1894 INTRO/SHEATH, NON-LASER: HCPCS

## 2022-01-09 PROCEDURE — 6360000004 HC RX CONTRAST MEDICATION: Performed by: STUDENT IN AN ORGANIZED HEALTH CARE EDUCATION/TRAINING PROGRAM

## 2022-01-09 PROCEDURE — P9016 RBC LEUKOCYTES REDUCED: HCPCS

## 2022-01-09 PROCEDURE — 36430 TRANSFUSION BLD/BLD COMPNT: CPT

## 2022-01-09 PROCEDURE — 99152 MOD SED SAME PHYS/QHP 5/>YRS: CPT

## 2022-01-09 PROCEDURE — 93454 CORONARY ARTERY ANGIO S&I: CPT

## 2022-01-09 PROCEDURE — B2111ZZ FLUOROSCOPY OF MULTIPLE CORONARY ARTERIES USING LOW OSMOLAR CONTRAST: ICD-10-PCS | Performed by: INTERNAL MEDICINE

## 2022-01-09 PROCEDURE — 92929 HC PRQ CARD STENT W/ANGIO ADDL: CPT

## 2022-01-09 PROCEDURE — 99221 1ST HOSP IP/OBS SF/LOW 40: CPT | Performed by: SURGERY

## 2022-01-09 PROCEDURE — 2500000003 HC RX 250 WO HCPCS

## 2022-01-09 PROCEDURE — 2580000003 HC RX 258: Performed by: FAMILY MEDICINE

## 2022-01-09 PROCEDURE — 6370000000 HC RX 637 (ALT 250 FOR IP): Performed by: STUDENT IN AN ORGANIZED HEALTH CARE EDUCATION/TRAINING PROGRAM

## 2022-01-09 PROCEDURE — C1874 STENT, COATED/COV W/DEL SYS: HCPCS

## 2022-01-09 PROCEDURE — C1769 GUIDE WIRE: HCPCS

## 2022-01-09 PROCEDURE — 86140 C-REACTIVE PROTEIN: CPT

## 2022-01-09 PROCEDURE — 2700000000 HC OXYGEN THERAPY PER DAY

## 2022-01-09 PROCEDURE — 74176 CT ABD & PELVIS W/O CONTRAST: CPT

## 2022-01-09 PROCEDURE — 92928 PRQ TCAT PLMT NTRAC ST 1 LES: CPT

## 2022-01-09 PROCEDURE — 80053 COMPREHEN METABOLIC PANEL: CPT

## 2022-01-09 PROCEDURE — 85379 FIBRIN DEGRADATION QUANT: CPT

## 2022-01-09 PROCEDURE — 6360000002 HC RX W HCPCS

## 2022-01-09 PROCEDURE — 6370000000 HC RX 637 (ALT 250 FOR IP)

## 2022-01-09 PROCEDURE — 86923 COMPATIBILITY TEST ELECTRIC: CPT

## 2022-01-09 PROCEDURE — 85610 PROTHROMBIN TIME: CPT

## 2022-01-09 PROCEDURE — 027135Z DILATION OF CORONARY ARTERY, TWO ARTERIES WITH TWO DRUG-ELUTING INTRALUMINAL DEVICES, PERCUTANEOUS APPROACH: ICD-10-PCS | Performed by: INTERNAL MEDICINE

## 2022-01-09 PROCEDURE — 2709999900 HC NON-CHARGEABLE SUPPLY

## 2022-01-09 PROCEDURE — 93454 CORONARY ARTERY ANGIO S&I: CPT | Performed by: INTERNAL MEDICINE

## 2022-01-09 PROCEDURE — 85014 HEMATOCRIT: CPT

## 2022-01-09 PROCEDURE — 86900 BLOOD TYPING SEROLOGIC ABO: CPT

## 2022-01-09 PROCEDURE — C1887 CATHETER, GUIDING: HCPCS

## 2022-01-09 PROCEDURE — B2131ZZ FLUOROSCOPY OF MULTIPLE CORONARY ARTERY BYPASS GRAFTS USING LOW OSMOLAR CONTRAST: ICD-10-PCS | Performed by: INTERNAL MEDICINE

## 2022-01-09 PROCEDURE — 92928 PRQ TCAT PLMT NTRAC ST 1 LES: CPT | Performed by: INTERNAL MEDICINE

## 2022-01-09 PROCEDURE — C1760 CLOSURE DEV, VASC: HCPCS

## 2022-01-09 PROCEDURE — 0202U NFCT DS 22 TRGT SARS-COV-2: CPT

## 2022-01-09 PROCEDURE — 4A023N7 MEASUREMENT OF CARDIAC SAMPLING AND PRESSURE, LEFT HEART, PERCUTANEOUS APPROACH: ICD-10-PCS | Performed by: INTERNAL MEDICINE

## 2022-01-09 PROCEDURE — 99153 MOD SED SAME PHYS/QHP EA: CPT

## 2022-01-09 PROCEDURE — 85347 COAGULATION TIME ACTIVATED: CPT

## 2022-01-09 PROCEDURE — 85730 THROMBOPLASTIN TIME PARTIAL: CPT

## 2022-01-09 PROCEDURE — 93005 ELECTROCARDIOGRAM TRACING: CPT | Performed by: FAMILY MEDICINE

## 2022-01-09 PROCEDURE — 36415 COLL VENOUS BLD VENIPUNCTURE: CPT

## 2022-01-09 PROCEDURE — 2580000003 HC RX 258: Performed by: STUDENT IN AN ORGANIZED HEALTH CARE EDUCATION/TRAINING PROGRAM

## 2022-01-09 PROCEDURE — 85027 COMPLETE CBC AUTOMATED: CPT

## 2022-01-09 PROCEDURE — 6360000002 HC RX W HCPCS: Performed by: STUDENT IN AN ORGANIZED HEALTH CARE EDUCATION/TRAINING PROGRAM

## 2022-01-09 PROCEDURE — 86850 RBC ANTIBODY SCREEN: CPT

## 2022-01-09 PROCEDURE — 83605 ASSAY OF LACTIC ACID: CPT

## 2022-01-09 RX ORDER — 0.9 % SODIUM CHLORIDE 0.9 %
1000 INTRAVENOUS SOLUTION INTRAVENOUS ONCE
Status: COMPLETED | OUTPATIENT
Start: 2022-01-09 | End: 2022-01-09

## 2022-01-09 RX ORDER — NOREPINEPHRINE BIT/0.9 % NACL 16MG/250ML
2-100 INFUSION BOTTLE (ML) INTRAVENOUS CONTINUOUS
Status: DISCONTINUED | OUTPATIENT
Start: 2022-01-09 | End: 2022-01-10

## 2022-01-09 RX ORDER — ATROPINE SULFATE 1 MG/ML
1 INJECTION, SOLUTION INTRAMUSCULAR; INTRAVENOUS; SUBCUTANEOUS ONCE
Status: COMPLETED | OUTPATIENT
Start: 2022-01-09 | End: 2022-01-09

## 2022-01-09 RX ORDER — SODIUM CHLORIDE 9 MG/ML
INJECTION, SOLUTION INTRAVENOUS PRN
Status: COMPLETED | OUTPATIENT
Start: 2022-01-09 | End: 2022-01-10

## 2022-01-09 RX ORDER — HYDROMORPHONE HYDROCHLORIDE 1 MG/ML
0.5 INJECTION, SOLUTION INTRAMUSCULAR; INTRAVENOUS; SUBCUTANEOUS EVERY 4 HOURS PRN
Status: DISCONTINUED | OUTPATIENT
Start: 2022-01-09 | End: 2022-01-12 | Stop reason: HOSPADM

## 2022-01-09 RX ADMIN — SODIUM CHLORIDE, PRESERVATIVE FREE 10 ML: 5 INJECTION INTRAVENOUS at 20:18

## 2022-01-09 RX ADMIN — LEVOTHYROXINE SODIUM 25 MCG: 25 TABLET ORAL at 06:01

## 2022-01-09 RX ADMIN — IOPAMIDOL 200 ML: 612 INJECTION, SOLUTION INTRAVENOUS at 11:04

## 2022-01-09 RX ADMIN — ATROPINE SULFATE 1 MG: 1 INJECTION, SOLUTION INTRAMUSCULAR; INTRAVENOUS; SUBCUTANEOUS at 15:06

## 2022-01-09 RX ADMIN — SODIUM CHLORIDE, PRESERVATIVE FREE 10 ML: 5 INJECTION INTRAVENOUS at 09:02

## 2022-01-09 RX ADMIN — FLUVOXAMINE MALEATE 50 MG: 50 TABLET, COATED ORAL at 20:18

## 2022-01-09 RX ADMIN — Medication 10 MG: at 20:18

## 2022-01-09 RX ADMIN — HYDROMORPHONE HYDROCHLORIDE 0.5 MG: 1 INJECTION, SOLUTION INTRAMUSCULAR; INTRAVENOUS; SUBCUTANEOUS at 15:53

## 2022-01-09 RX ADMIN — SODIUM CHLORIDE 1000 ML: 9 INJECTION, SOLUTION INTRAVENOUS at 14:06

## 2022-01-09 ASSESSMENT — PAIN SCALES - GENERAL
PAINLEVEL_OUTOF10: 9
PAINLEVEL_OUTOF10: 0
PAINLEVEL_OUTOF10: 0

## 2022-01-09 NOTE — PROGRESS NOTES
Telemetry called that patient heart rate 26. Ran to room to find patient laying in bed with hand over forehead. Says she is very dizzy and weak. Pt pale. Scant amount of blood noted to right groin dressing. Rapid response called.

## 2022-01-09 NOTE — PROGRESS NOTES
Daily Progress Note    Date:2022  Patient: Edna Millard  : 1945  ITW:[id number]  CODE:[id number] No additional code details  María Elena Terry MD    Admit Date: 2022  2:00 AM   LOS: 1 day       Subjective:     Events noted. Rapid response called this afternoon hours after cardiac catheterization via right femoral approach, due to heart rate dropping to 20s with hypotension systolic and 04K with associated lightheadedness. Patient denied any chest pain, shortness of breath or palpitations. Summary: 13-year-old female with lupus, history of thyroidectomy with hypothyroidism, history of mitral valve repair, presented via EMS due to worsening chest pain with associated dizziness and fall, EKG changes noted with elevated troponin concerning for NSTEMI. Evaluated by cardiology. Also screen positive for COVID-19, however no clear evidence of pneumonia or pulmonary edema on initial chest x-ray. Day following admission taken for cardiac catheterization via right femoral artery approach due to difficulty accessing radial artery and noted to have obstructive CAD requiring stents x2. Noted to have right groin hematoma following procedure. Hours later, patient became bradycardic with heart rate down to 20s and hypotensive with systolic down to 05O with associated lightheadedness. Given atropine with improvement in HR, remained in sinus rhythm. Transferred to CCU, hypotension responded to fluid resuscitation with 2 L, drop in hemoglobin noted and transfused PRBCs. CT abdomen/pelvis obtained showed significant extension of hematoma with intramuscular hemorrhage and abdominis rectus, right groin and and cul-de-sac. Arterial Doppler to further evaluate groin/femoral artery. Vascular surgery consulted.         Review of Systems  Comprehensive ROS completed and is negative except as otherwise noted        Objective:      Vital signs in last 24 hours:  Patient Vitals for the past 24 hrs:   BP Temp Temp src Pulse Resp SpO2   01/09/22 1317 (!) 84/45 -- -- 56 -- 92 %   01/09/22 1258 (!) 81/44 97.2 °F (36.2 °C) Temporal 50 14 94 %   01/09/22 1227 (!) 87/47 -- -- 68 14 94 %   01/09/22 1200 (!) 97/47 -- -- 50 14 92 %   01/09/22 1145 (!) 97/55 -- -- 60 14 94 %   01/09/22 1130 (!) 98/53 -- -- 52 16 94 %   01/09/22 1115 (!) 124/56 -- -- (!) 48 16 95 %   01/09/22 0631 (!) 101/46 97 °F (36.1 °C) -- 54 16 98 %   01/09/22 0013 (!) 96/50 97.3 °F (36.3 °C) -- 54 16 98 %       Physical exam    General: Ill-appearing  HEENT: Appears pale, NC/AT  Psych: Anxious  Cardiovascular: Bradycardic, regular rhythm, pulses equal  Respiratory: CTA B, no wheezes no rales  Abdomen: Soft, nontender, bowel sounds present  Neurologic: Conversant, follows commands, normal speech  Extremities: Large right groin hematoma noted, no edema  Skin: Warm and dry, well perfused      Lab Review   Recent Results (from the past 24 hour(s))   Troponin    Collection Time: 01/08/22  4:07 PM   Result Value Ref Range    Troponin 0.09 (H) 0.00 - 0.03 ng/mL   CBC    Collection Time: 01/09/22  4:13 AM   Result Value Ref Range    WBC 3.3 (L) 4.8 - 10.8 K/uL    RBC 3.37 (L) 4.20 - 5.40 M/uL    Hemoglobin 10.6 (L) 12.0 - 16.0 g/dL    Hematocrit 32.6 (L) 37.0 - 47.0 %    MCV 96.7 81.0 - 99.0 fL    MCH 31.5 (H) 27.0 - 31.0 pg    MCHC 32.5 (L) 33.0 - 37.0 g/dL    RDW 13.9 11.5 - 14.5 %    Platelets 860 960 - 810 K/uL    MPV 10.3 9.4 - 12.3 fL   Comprehensive Metabolic Panel w/ Reflex to MG    Collection Time: 01/09/22  4:13 AM   Result Value Ref Range    Sodium 131 (L) 136 - 145 mmol/L    Potassium reflex Magnesium 4.4 3.5 - 5.0 mmol/L    Chloride 98 98 - 111 mmol/L    CO2 23 22 - 29 mmol/L    Anion Gap 10 7 - 19 mmol/L    Glucose 127 (H) 74 - 109 mg/dL    BUN 31 (H) 8 - 23 mg/dL    CREATININE 1.0 (H) 0.5 - 0.9 mg/dL    GFR Non-African American 54 (A) >60    GFR African American >59 >59    Calcium 8.1 (L) 8.8 - 10.2 mg/dL    Total Protein 4.6 (L) 6.6 - 8.7 g/dL    Albumin 2.9 (L) 3.5 - 5.2 g/dL    Total Bilirubin 0.3 0.2 - 1.2 mg/dL    Alkaline Phosphatase 61 35 - 104 U/L    ALT 11 5 - 33 U/L    AST 32 5 - 32 U/L   D-Dimer, Quantitative    Collection Time: 01/09/22  4:13 AM   Result Value Ref Range    D-Dimer, Quant 2.24 (H) 0.00 - 0.48 ug/mL FEU   C-REACTIVE PROTEIN    Collection Time: 01/09/22  4:13 AM   Result Value Ref Range    CRP 1.02 (H) 0.00 - 0.50 mg/dL   EKG 12 lead    Collection Time: 01/09/22  7:06 AM   Result Value Ref Range    P-R Interval 180 ms    QRS Duration 86 ms    Q-T Interval 514 ms    QTc Calculation (Bazett) 493 ms    P Axis 85 degrees    T Axis -119 degrees       I/O last 3 completed shifts: In: 5 [P.O.:420]  Out: -   No intake/output data recorded.       Current Facility-Administered Medications:     [Held by provider] enoxaparin (LOVENOX) injection 30 mg, 30 mg, SubCUTAneous, Q24H, Sourav Eduardo MD    0.9 % sodium chloride bolus, 1,000 mL, IntraVENous, Once, Baldomero Moreno MD    norepinephrine (LEVOPHED) 16 mg in dextrose 5% 250 mL infusion, 2-100 mcg/min, IntraVENous, Continuous, MD Aileen Bal  [facility] by provider] losartan (COZAAR) tablet 25 mg, 25 mg, Oral, BID, Sourav Eduardo MD    sodium chloride flush 0.9 % injection 5-40 mL, 5-40 mL, IntraVENous, 2 times per day, Sourav Eduardo MD, 10 mL at 01/09/22 0902    sodium chloride flush 0.9 % injection 5-40 mL, 5-40 mL, IntraVENous, PRN, Sourav Eduardo MD    0.9 % sodium chloride infusion, 25 mL, IntraVENous, PRN, Sourav Eduardo MD    ondansetron (ZOFRAN-ODT) disintegrating tablet 4 mg, 4 mg, Oral, Q8H PRN **OR** ondansetron (ZOFRAN) injection 4 mg, 4 mg, IntraVENous, Q6H PRN, Sourav Eduardo MD    acetaminophen (TYLENOL) tablet 650 mg, 650 mg, Oral, Q6H PRN **OR** acetaminophen (TYLENOL) suppository 650 mg, 650 mg, Rectal, Q6H PRN, Sourav Eduardo MD    polyethylene glycol [facility]) packet 17 g, 17 g, Oral, Daily PRN, Sourav Eduardo MD    aspirin chewable tablet 81 mg, 81 mg, Oral, Daily, Maisha De La Torre MD    perflutren lipid microspheres (DEFINITY) injection 1.65 mg, 1.5 mL, IntraVENous, ONCE PRN, Maisha De La Torre MD  [facility] by provider] metoprolol succinate (TOPROL XL) extended release tablet 25 mg, 25 mg, Oral, Daily, Maisha De La Torre MD, 25 mg at 01/08/22 1001    acetaminophen (TYLENOL) tablet 650 mg, 650 mg, Oral, Q6H PRN **OR** acetaminophen (TYLENOL) suppository 650 mg, 650 mg, Rectal, Q6H PRN, Maisha De La Torre MD    guaiFENesin-dextromethorphan (ROBITUSSIN DM) 100-10 MG/5ML syrup 5 mL, 5 mL, Oral, Q4H PRN, Maisha De La Torre MD    Vitamin D (CHOLECALCIFEROL) tablet 2,000 Units, 2,000 Units, Oral, Daily, Maisha De La Torre MD, 2,000 Units at 01/08/22 1001    levothyroxine (SYNTHROID) tablet 25 mcg, 25 mcg, Oral, Daily, Ariana Arechiga MD, 25 mcg at 01/09/22 0601    predniSONE (DELTASONE) tablet 40 mg, 40 mg, Oral, Daily, Ariana Arechiga MD, 40 mg at 01/08/22 1001    fluvoxaMINE (LUVOX) tablet 50 mg, 50 mg, Oral, BID, Ariana Arechiga MD, 50 mg at 01/08/22 2018    melatonin capsule 10 mg, 10 mg, Oral, Nightly, Ariana Arechiga MD, 10 mg at 01/08/22 2018          Assessment/Plan  Principal Problem:    Chest pain due to coronary artery disease [facility])  Active Problems:    COVID-19    Mitral valve prolapse    History of mitral valve repair    Shortness of breath    Unresponsiveness    Depression    Severe malnutrition (HCC)    Weight loss, non-intentional    Lupus (systemic lupus erythematosus) (HCC)    Chest pain    NSTEMI (non-ST elevation myocardial infarction) ([facility] [address]  Resolved Problems:    * No resolved hospital problems.  *    Hemorrhagic shock  CT abdomen/pelvis reviewed-hemorrhage in abdominis rectus muscle, right groin  Obtain arterial Doppler of right groin  --Holding anticoagulation  -Volume resuscitate with crystalloid and PRBCs  Follow H&H closely, transfuse as warranted  Add vasopressor support if MAP drops below 60 despite adequate volume resuscitation    NSTEMI, status post cardiac catheterization with PCI, stenting x2  History of mitral valve repair  Cardiology following    Symptomatic bradycardia with hypotension  -Atropine given with improvement in HR, monitor hemodynamics and telemetry    COVID-19, no evident pneumonia or hypoxia  Supportive care  Adjunctive therapy      Total critical care time of 65 minutes including patient evaluation, chart review, management plan and frequent reassessment of response to treatment    Giovanny Fischer MD 1/9/2022 2:15 PM

## 2022-01-09 NOTE — PROGRESS NOTES
Called and spoke with pt daughter Colton Medina, to let her know of patient transfer to CCU and her condition.

## 2022-01-09 NOTE — PROGRESS NOTES
Pt laying in bed with eyes closed. Dressing to groin continues to be dry and intact. Hematoma size same.

## 2022-01-09 NOTE — CONSULTS
 HYDROmorphone HCl PF (DILAUDID) injection 0.5 mg  0.5 mg IntraVENous Q4H PRN Paige Corrales MD   0.5 mg at 01/09/22 1553    [Held by provider] losartan (COZAAR) tablet 25 mg  25 mg Oral BID Rose Yeung MD        sodium chloride flush 0.9 % injection 5-40 mL  5-40 mL IntraVENous 2 times per day Rose Yeung MD   10 mL at 01/09/22 0902    sodium chloride flush 0.9 % injection 5-40 mL  5-40 mL IntraVENous PRN Rose Yeung MD        0.9 % sodium chloride infusion  25 mL IntraVENous PRN Rose Yeung MD        ondansetron (ZOFRAN-ODT) disintegrating tablet 4 mg  4 mg Oral Q8H PRN Rose Yeung MD        Or    ondansetron [facility]) injection 4 mg  4 mg IntraVENous Q6H PRN Rose Yeung MD        acetaminophen (TYLENOL) tablet 650 mg  650 mg Oral Q6H PRN Rose Yeung MD        Or    acetaminophen (TYLENOL) suppository 650 mg  650 mg Rectal Q6H PRN Rose Yeung MD        polyethylene glycol [facility]) packet 17 g  17 g Oral Daily PRN Rose Yeung MD        [facility] by provider] aspirin chewable tablet 81 mg  81 mg Oral Daily Rose Yeung MD        perflutren lipid microspheres (DEFINITY) injection 1.65 mg  1.5 mL IntraVENous ONCE PRN Rose Yeung MD        [facility] by provider] metoprolol succinate (TOPROL XL) extended release tablet 25 mg  25 mg Oral Daily Rose Yeung MD   25 mg at 01/08/22 1001    acetaminophen (TYLENOL) tablet 650 mg  650 mg Oral Q6H PRN Rose Yeung MD        Or    acetaminophen (TYLENOL) suppository 650 mg  650 mg Rectal Q6H PRN Rose Yeung MD        guaiFENesin-dextromethorphan [facility]) 100-10 MG/5ML syrup 5 mL  5 mL Oral Q4H PRN Rose Yeung MD        Vitamin D (CHOLECALCIFEROL) tablet 2,000 Units  2,000 Units Oral Daily Rose Yeung MD   2,000 Units at 01/08/22 1001    levothyroxine (SYNTHROID) tablet 25 mcg  25 mcg Oral Daily Paige Corrales MD   25 mcg at 01/09/22 0601    predniSONE (DELTASONE) tablet 40 mg  40 mg Oral Daily Cezar Weiner MD   40 mg at 01/08/22 1001    fluvoxaMINE (LUVOX) tablet 50 mg  50 mg Oral BID Cezar Weiner MD   50 mg at 01/08/22 2018    melatonin capsule 10 mg  10 mg Oral Nightly Cezar Weiner MD   10 mg at 01/08/22 2018     Allergies: Lisinopril, Cellcept [mycophenolate], Hydrochlorothiazide, Hydroxychloroquine sulfate, Maxalt [rizatriptan], and Statins [statins]  Past Medical History:   Diagnosis Date    Dizziness     H/O thyroidectomy 2015    2 non-malginant masses    History of mitral valve repair     Hypothyroidism     [facility]up[facility] ([facility] [address]     Migraine     Myasthenia gravis ([facility] [address]     PONV (postoperative nausea and vomiting)     TIA (transient ischemic attack) 2006     Past Surgical History:   Procedure Laterality Date    BACK SURGERY      CARDIAC CATHETERIZATION  2006    MVP    CORONARY ARTERY BYPASS GRAFT  2006    HAND TENDON SURGERY Right 3/29/2019    MCP JOINT RIGHT HAND performed by Yvon Sebastian DO at [address] [place] [address] THYROIDECTOMY  2014    TONSILLECTOMY       Family History   Problem Relation Age of Onset    Diabetes Sister         passed away due to Diabetes    Heart Disease Brother     High Cholesterol Brother     High Blood Pressure Brother     High Blood Pressure Sister     High Blood Pressure Sister     High Blood Pressure Brother     High Cholesterol Brother     High Blood Pressure Brother     High Cholesterol Brother     Cancer Brother     High Blood Pressure Brother     High Cholesterol Brother     Cancer Brother     High Blood Pressure Brother     High Cholesterol Brother      Social History     Tobacco Use    Smoking status: Former Smoker     Packs/day: 0.00     Years: 0.00     Pack years: 0.00     Types: Cigarettes    Smokeless tobacco: Never Used   Substance Use Topics    Alcohol use: Not Currently         Review of Systems    Unable to obtain at this moment as she was give Dlaudid.        Physical Exam    BP (!) 103/48   Pulse 56   Temp 97.3 °F (36.3 °C)   Resp 18   Ht 5' 4\" (1.626 m)   Wt 75 lb (34 kg)   SpO2 92%   BMI 12.87 kg/m²     Constitutional - well developed, well nourished. No diaphoresis or acute distress. HENT - head normocephalic. Right external ear canal appears normal.  Left external ear canal appears normal.  Septum appears midline. Eyes - conjunctiva normal.  EOMS normal.  No exudate. No icterus. Neck- ROM appears normal, no tracheal deviation. Cardiovascular -   Regular   Extremities - Right DP: present 1+; Right PT present 1+; Left DP: present 1+; Left PT: present 1+    No cyanosis, clubbing, or significant edema. No signs atheroembolic event. Right groin with dressing, soft, subcutaneous ecchymosis. Left groin palpable pulse  Pulmonary - effort appears normal.    No respiratory distress. GI - Abdomen - soft, non tender, bowel sounds X 4 quadrants. No guarding or rebound tenderness. No distension or palpable mass. Genitourinary - deferred. Musculoskeletal - ROM appears normal.  No significant edema. Neurologic - alert and oriented X 3. Physiologic. Skin - warm, dry, and intact. No rash, erythema, or pallor. Psychiatric - mood, affect, and behavior appear normal.  Judgment and thought processes appear normal.            Assessment    1. Chest pain, unspecified type    2. Lab test positive for detection of COVID-19 virus    3. Dizziness    4.  Closed head injury, initial encounter          Plan    1u PRBC in progress, monitor h/h q4h for today  Continue monitoring right groin  Right groin US pending  Bedrest for today

## 2022-01-09 NOTE — PROGRESS NOTES
Verbal consent obtained to receive blood. Clotilde Soto RN and Mikey Delgado RN present.      Electronically signed by Clotilde Soto RN on 1/9/2022 at 3:38 PM

## 2022-01-09 NOTE — PROGRESS NOTES
Dr. Cristina Rangel at bedside to see patient. Noted the hematoma to right groin. Order received for blood work at 1500.

## 2022-01-10 PROBLEM — Z51.5 PALLIATIVE CARE PATIENT: Status: ACTIVE | Noted: 2022-01-10

## 2022-01-10 LAB
ALBUMIN SERPL-MCNC: 2.6 G/DL (ref 3.5–5.2)
ALP BLD-CCNC: 54 U/L (ref 35–104)
ALT SERPL-CCNC: 11 U/L (ref 5–33)
ANION GAP SERPL CALCULATED.3IONS-SCNC: 9 MMOL/L (ref 7–19)
AST SERPL-CCNC: 30 U/L (ref 5–32)
BILIRUB SERPL-MCNC: 0.3 MG/DL (ref 0.2–1.2)
BUN BLDV-MCNC: 31 MG/DL (ref 8–23)
C-REACTIVE PROTEIN: 0.57 MG/DL (ref 0–0.5)
CALCIUM SERPL-MCNC: 8 MG/DL (ref 8.8–10.2)
CHLORIDE BLD-SCNC: 103 MMOL/L (ref 98–111)
CO2: 24 MMOL/L (ref 22–29)
CREAT SERPL-MCNC: 0.8 MG/DL (ref 0.5–0.9)
EKG P AXIS: 100 DEGREES
EKG P AXIS: 67 DEGREES
EKG P AXIS: 85 DEGREES
EKG P-R INTERVAL: 172 MS
EKG P-R INTERVAL: 180 MS
EKG P-R INTERVAL: 184 MS
EKG Q-T INTERVAL: 422 MS
EKG Q-T INTERVAL: 450 MS
EKG Q-T INTERVAL: 514 MS
EKG QRS DURATION: 86 MS
EKG QRS DURATION: 86 MS
EKG QRS DURATION: 88 MS
EKG QTC CALCULATION (BAZETT): 430 MS
EKG QTC CALCULATION (BAZETT): 472 MS
EKG QTC CALCULATION (BAZETT): 493 MS
EKG T AXIS: -119 DEGREES
EKG T AXIS: -138 DEGREES
EKG T AXIS: -142 DEGREES
GFR AFRICAN AMERICAN: >59
GFR NON-AFRICAN AMERICAN: >60
GLUCOSE BLD-MCNC: 165 MG/DL (ref 70–99)
GLUCOSE BLD-MCNC: 56 MG/DL (ref 70–99)
GLUCOSE BLD-MCNC: 66 MG/DL (ref 74–109)
GLUCOSE BLD-MCNC: 84 MG/DL (ref 70–99)
HCT VFR BLD CALC: 33.6 % (ref 37–47)
HCT VFR BLD CALC: 37.1 % (ref 37–47)
HEMOGLOBIN: 10.6 G/DL (ref 12–16)
HEMOGLOBIN: 11.4 G/DL (ref 12–16)
MCH RBC QN AUTO: 30 PG (ref 27–31)
MCHC RBC AUTO-ENTMCNC: 30.7 G/DL (ref 33–37)
MCV RBC AUTO: 97.6 FL (ref 81–99)
PDW BLD-RTO: 14.6 % (ref 11.5–14.5)
PERFORMED ON: ABNORMAL
PERFORMED ON: ABNORMAL
PERFORMED ON: NORMAL
PLATELET # BLD: 136 K/UL (ref 130–400)
PMV BLD AUTO: 10.6 FL (ref 9.4–12.3)
POTASSIUM REFLEX MAGNESIUM: 4.6 MMOL/L (ref 3.5–5)
RBC # BLD: 3.8 M/UL (ref 4.2–5.4)
SODIUM BLD-SCNC: 136 MMOL/L (ref 136–145)
TOTAL PROTEIN: 4.9 G/DL (ref 6.6–8.7)
WBC # BLD: 6.6 K/UL (ref 4.8–10.8)

## 2022-01-10 PROCEDURE — 6370000000 HC RX 637 (ALT 250 FOR IP): Performed by: INTERNAL MEDICINE

## 2022-01-10 PROCEDURE — 93010 ELECTROCARDIOGRAM REPORT: CPT | Performed by: INTERNAL MEDICINE

## 2022-01-10 PROCEDURE — 36415 COLL VENOUS BLD VENIPUNCTURE: CPT

## 2022-01-10 PROCEDURE — 2500000003 HC RX 250 WO HCPCS: Performed by: INTERNAL MEDICINE

## 2022-01-10 PROCEDURE — 82947 ASSAY GLUCOSE BLOOD QUANT: CPT

## 2022-01-10 PROCEDURE — 93926 LOWER EXTREMITY STUDY: CPT

## 2022-01-10 PROCEDURE — 86140 C-REACTIVE PROTEIN: CPT

## 2022-01-10 PROCEDURE — 85018 HEMOGLOBIN: CPT

## 2022-01-10 PROCEDURE — 6370000000 HC RX 637 (ALT 250 FOR IP): Performed by: STUDENT IN AN ORGANIZED HEALTH CARE EDUCATION/TRAINING PROGRAM

## 2022-01-10 PROCEDURE — 97530 THERAPEUTIC ACTIVITIES: CPT

## 2022-01-10 PROCEDURE — 2580000003 HC RX 258: Performed by: STUDENT IN AN ORGANIZED HEALTH CARE EDUCATION/TRAINING PROGRAM

## 2022-01-10 PROCEDURE — 85014 HEMATOCRIT: CPT

## 2022-01-10 PROCEDURE — 2700000000 HC OXYGEN THERAPY PER DAY

## 2022-01-10 PROCEDURE — 6370000000 HC RX 637 (ALT 250 FOR IP): Performed by: FAMILY MEDICINE

## 2022-01-10 PROCEDURE — 85027 COMPLETE CBC AUTOMATED: CPT

## 2022-01-10 PROCEDURE — 99233 SBSQ HOSP IP/OBS HIGH 50: CPT | Performed by: INTERNAL MEDICINE

## 2022-01-10 PROCEDURE — 99223 1ST HOSP IP/OBS HIGH 75: CPT | Performed by: PHYSICIAN ASSISTANT

## 2022-01-10 PROCEDURE — 2580000003 HC RX 258: Performed by: FAMILY MEDICINE

## 2022-01-10 PROCEDURE — 2100000000 HC CCU R&B

## 2022-01-10 PROCEDURE — 97165 OT EVAL LOW COMPLEX 30 MIN: CPT

## 2022-01-10 PROCEDURE — 80053 COMPREHEN METABOLIC PANEL: CPT

## 2022-01-10 PROCEDURE — 97162 PT EVAL MOD COMPLEX 30 MIN: CPT

## 2022-01-10 RX ORDER — PREDNISONE 20 MG/1
20 TABLET ORAL DAILY
Status: DISCONTINUED | OUTPATIENT
Start: 2022-01-12 | End: 2022-01-11

## 2022-01-10 RX ORDER — DEXTROSE MONOHYDRATE 50 MG/ML
100 INJECTION, SOLUTION INTRAVENOUS PRN
Status: DISCONTINUED | OUTPATIENT
Start: 2022-01-10 | End: 2022-01-12 | Stop reason: HOSPADM

## 2022-01-10 RX ORDER — METOPROLOL SUCCINATE 25 MG/1
12.5 TABLET, EXTENDED RELEASE ORAL DAILY
Status: DISCONTINUED | OUTPATIENT
Start: 2022-01-11 | End: 2022-01-12 | Stop reason: HOSPADM

## 2022-01-10 RX ORDER — CLOPIDOGREL BISULFATE 75 MG/1
75 TABLET ORAL DAILY
Status: DISCONTINUED | OUTPATIENT
Start: 2022-01-10 | End: 2022-01-12 | Stop reason: HOSPADM

## 2022-01-10 RX ORDER — DEXTROSE MONOHYDRATE 25 G/50ML
12.5 INJECTION, SOLUTION INTRAVENOUS PRN
Status: DISCONTINUED | OUTPATIENT
Start: 2022-01-10 | End: 2022-01-12 | Stop reason: HOSPADM

## 2022-01-10 RX ORDER — PREDNISONE 1 MG/1
10 TABLET ORAL DAILY
Status: DISCONTINUED | OUTPATIENT
Start: 2022-01-13 | End: 2022-01-11

## 2022-01-10 RX ORDER — NICOTINE POLACRILEX 4 MG
15 LOZENGE BUCCAL PRN
Status: DISCONTINUED | OUTPATIENT
Start: 2022-01-10 | End: 2022-01-12 | Stop reason: HOSPADM

## 2022-01-10 RX ADMIN — Medication 2000 UNITS: at 07:59

## 2022-01-10 RX ADMIN — LOSARTAN POTASSIUM 25 MG: 25 TABLET, FILM COATED ORAL at 20:51

## 2022-01-10 RX ADMIN — ONDANSETRON 4 MG: 4 TABLET, ORALLY DISINTEGRATING ORAL at 17:22

## 2022-01-10 RX ADMIN — FLUVOXAMINE MALEATE 50 MG: 50 TABLET, COATED ORAL at 07:59

## 2022-01-10 RX ADMIN — DEXTROSE MONOHYDRATE 12.5 G: 25 INJECTION, SOLUTION INTRAVENOUS at 05:38

## 2022-01-10 RX ADMIN — SODIUM CHLORIDE, PRESERVATIVE FREE 10 ML: 5 INJECTION INTRAVENOUS at 20:51

## 2022-01-10 RX ADMIN — LEVOTHYROXINE SODIUM 25 MCG: 25 TABLET ORAL at 07:59

## 2022-01-10 RX ADMIN — FLUVOXAMINE MALEATE 50 MG: 50 TABLET, COATED ORAL at 20:51

## 2022-01-10 RX ADMIN — CLOPIDOGREL BISULFATE 75 MG: 75 TABLET ORAL at 20:51

## 2022-01-10 RX ADMIN — SODIUM CHLORIDE: 9 INJECTION, SOLUTION INTRAVENOUS at 03:32

## 2022-01-10 RX ADMIN — Medication 10 MG: at 20:51

## 2022-01-10 RX ADMIN — PREDNISONE 40 MG: 20 TABLET ORAL at 07:59

## 2022-01-10 ASSESSMENT — PAIN DESCRIPTION - ONSET: ONSET: GRADUAL

## 2022-01-10 ASSESSMENT — PAIN - FUNCTIONAL ASSESSMENT
PAIN_FUNCTIONAL_ASSESSMENT: PREVENTS OR INTERFERES SOME ACTIVE ACTIVITIES AND ADLS
PAIN_FUNCTIONAL_ASSESSMENT: PREVENTS OR INTERFERES SOME ACTIVE ACTIVITIES AND ADLS

## 2022-01-10 ASSESSMENT — PAIN SCALES - GENERAL
PAINLEVEL_OUTOF10: 0
PAINLEVEL_OUTOF10: 0

## 2022-01-10 ASSESSMENT — PAIN DESCRIPTION - ORIENTATION
ORIENTATION: RIGHT
ORIENTATION: RIGHT

## 2022-01-10 ASSESSMENT — PAIN DESCRIPTION - LOCATION
LOCATION: HIP;GROIN
LOCATION: HIP

## 2022-01-10 ASSESSMENT — PAIN DESCRIPTION - PAIN TYPE
TYPE: ACUTE PAIN
TYPE: ACUTE PAIN

## 2022-01-10 ASSESSMENT — PAIN SCALES - WONG BAKER
WONGBAKER_NUMERICALRESPONSE: 6
WONGBAKER_NUMERICALRESPONSE: 6

## 2022-01-10 ASSESSMENT — PAIN DESCRIPTION - FREQUENCY
FREQUENCY: INTERMITTENT
FREQUENCY: INTERMITTENT

## 2022-01-10 NOTE — PROGRESS NOTES
Updated daughter on situation. Redraw hemoglobin 11.3. Notified Dr. Dom Cm.      Electronically signed by Gold Yeh RN on 1/9/2022 at 8:04 PM

## 2022-01-10 NOTE — PROGRESS NOTES
Dr. Parul Ruby at bedside. Pressure discontinued. Patient states she fells okay. Dr. Parul Ruby does not believe the bleed in still active. Site is no longer swelling. BP holding stable.    Electronically signed by Jewels Marsh RN on 1/9/2022 at 8:03 PM

## 2022-01-10 NOTE — PROGRESS NOTES
Vascular lab preliminary. Right groin duplex scan completed.  + Pseudo measures 1.01cm x 2.46cm long. It comes off of CFA. No evidence for hematoma, fistula or DVT. Final report pending.

## 2022-01-10 NOTE — PROGRESS NOTES
Physical Therapy    Facility/Department: [facility] CORONARY CARE UNIT  Initial Assessment    NAME: Chuckie Dubin  : 1945  MRN: [id number]    Date of Service: 1/10/2022    Discharge Recommendations:  Continue to assess pending progress,Patient would benefit from continued therapy after discharge        Assessment   Body structures, Functions, Activity limitations: Decreased functional mobility ; Decreased ADL status; Decreased strength;Decreased endurance;Decreased balance; Increased pain  Assessment: Pt C/O PAIN IN RLE/GROIN WITH ACTIVE MOVEMENT. ABLE TO SIT BRIEFLY AND STAND TO TAKE SMALL SIDE STEPS WITH ASSIST. WILL PROGRESS AS TOLERATED. DID NOT GET UP TO CHAIR AS RADIOLOGY REQUESTED Pt TO BE SUPINE. PT Education: PT Role;Plan of Care  REQUIRES PT FOLLOW UP: Yes  Activity Tolerance  Activity Tolerance: Patient limited by pain; Patient Tolerated treatment well       Patient Diagnosis(es): The primary encounter diagnosis was Chest pain, unspecified type. Diagnoses of Lab test positive for detection of COVID-19 virus, Dizziness, and Closed head injury, initial encounter were also pertinent to this visit. has a past medical history of Dizziness, H/O thyroidectomy, History of mitral valve repair, Hypothyroidism, Lupus ([address] Migraine, Myasthenia gravis ([address] Palliative care patient, PONV (postoperative nausea and vomiting), and TIA (transient ischemic attack). has a past surgical history that includes Tonsillectomy; Coronary artery bypass graft (); Cardiac catheterization (); Thyroidectomy (); back surgery; and Hand tendon surgery (Right, 3/29/2019).     Restrictions  Restrictions/Precautions  Restrictions/Precautions: Fall Risk  Vision/Hearing        Subjective  General  Patient assessed for rehabilitation services?: Yes  Diagnosis: NSTEMI/HEART CATH, COVID, CHI, R GROIN HEMATOMA  Subjective  Subjective: Pt HESITANT DUE TO PAIN BUT WILLING TO PARTICIPATE  Pain Screening  Patient Currently in Pain: Yes  Pain Assessment  Pain Assessment: Faces  Jurado-Baker Pain Rating: Hurts even more  Pain Type: Acute pain  Pain Location: Hip;Groin  Pain Orientation: Right  Pain Frequency: Intermittent  Pain Onset:  (CERTAIN ACTIVE MOVEMENTS)  Functional Pain Assessment: Prevents or interferes some active activities and ADLs  Non-Pharmaceutical Pain Intervention(s): Ambulation/Increased Activity;Repositioned; Rest  Response to Pain Intervention: Patient Satisfied  Vital Signs  Patient Currently in Pain: Yes       Orientation  Orientation  Overall Orientation Status: Within Functional Limits  Social/Functional History  Social/Functional History  Lives With: Spouse  Type of Home: House  Home Equipment:  (None)  ADL Assistance: Independent  Ambulation Assistance: Independent  Transfer Assistance: Independent  Additional Comments: LIKELY PLANS FOR CONTINUED THERAPY  Cognition   Cognition  Overall Cognitive Status: WFL    Objective          AROM RLE (degrees)  RLE AROM: WFL  RLE General AROM: HIP PAINFUL  AROM LLE (degrees)  LLE AROM : WFL  Strength RLE  Comment: GROSSLY +3/5  Strength LLE  Comment: GROSSLY -4/5        Bed mobility  Supine to Sit: Minimal assistance (EXTRA TIME DUE TO DISCOMFORT)  Sit to Supine: Minimal assistance  Transfers  Sit to Stand: Minimal Assistance;2 Person Assistance  Stand to sit: Minimal Assistance;2 Person Assistance  Comment: 2-3 SMALL SIDE STEPS EOB WITH HHA  Ambulation  Ambulation?: No     Balance  Sitting - Dynamic: Fair;+  Standing - Dynamic: Fair;-        Plan   Plan  Times per week: AT LEAST 4-6  Current Treatment Recommendations: Strengthening,Balance Training,Functional Mobility Training,Transfer [employer] Training,Safety Education & Training,Patient/Caregiver Education & Training  Safety Devices  Type of devices: Call light within reach,Bed alarm in place    G-Code       OutComes Score                                                  AM-PAC Score             Goals  Short term goals  Time Frame for Short term goals: 14 DAYS  Short term goal 1: BED MOB SUPERVISION  Short term goal 2: TRANSFERS SBA  Short term goal 3: ' AAD SBA       Therapy Time   Individual Concurrent Group Co-treatment   Time In           Time Out           Minutes                   Marcelle Soliz, PT

## 2022-01-10 NOTE — PLAN OF CARE
Vascular Surgery      Review of A-scan of right groin with small pseudoaneurysm. No evidence of hematoma. Vascular surgery will sign off. Will follow-up in office with repeat A-scan.       Portia Rojo, APRN-BC

## 2022-01-10 NOTE — PLAN OF CARE
Problem: Nutrition  Goal: Optimal nutrition therapy  Outcome: Ongoing     Problem: Airway Clearance - Ineffective  Goal: Achieve or maintain patent airway  Outcome: Ongoing     Problem: Gas Exchange - Impaired  Goal: Absence of hypoxia  Outcome: Ongoing  Goal: Promote optimal lung function  Outcome: Ongoing     Problem: Breathing Pattern - Ineffective  Goal: Ability to achieve and maintain a regular respiratory rate  Outcome: Ongoing     Problem:  Body Temperature -  Risk of, Imbalanced  Goal: Ability to maintain a body temperature within defined limits  Outcome: Ongoing  Goal: Will regain or maintain usual level of consciousness  Outcome: Ongoing  Goal: Complications related to the disease process, condition or treatment will be avoided or minimized  Outcome: Ongoing     Problem: Isolation Precautions - Risk of Spread of Infection  Goal: Prevent transmission of infection  Outcome: Ongoing     Problem: Nutrition Deficits  Goal: Optimize nutritional status  Outcome: Ongoing     Problem: Risk for Fluid Volume Deficit  Goal: Maintain normal heart rhythm  Outcome: Ongoing  Goal: Maintain absence of muscle cramping  Outcome: Ongoing  Goal: Maintain normal serum potassium, sodium, calcium, phosphorus, and pH  Outcome: Ongoing     Problem: Loneliness or Risk for Loneliness  Goal: Demonstrate positive use of time alone when socialization is not possible  Outcome: Ongoing     Problem: Fatigue  Goal: Verbalize increase energy and improved vitality  Outcome: Ongoing     Problem: Patient Education: Go to Patient Education Activity  Goal: Patient/Family Education  Outcome: Ongoing     Problem: Falls - Risk of:  Goal: Will remain free from falls  Description: Will remain free from falls  Outcome: Ongoing  Goal: Absence of physical injury  Description: Absence of physical injury  Outcome: Ongoing     Problem: Skin Integrity:  Goal: Will show no infection signs and symptoms  Description: Will show no infection signs and symptoms  Outcome: Ongoing  Goal: Absence of new skin breakdown  Description: Absence of new skin breakdown  Outcome: Ongoing

## 2022-01-10 NOTE — PROGRESS NOTES
Escorted patient on monitor to CT scan. CT confirmed active bleeding. Manual pressure held for 2 hours.      Electronically signed by Antony Bal RN on 1/9/2022 at 8:01 PM

## 2022-01-10 NOTE — CONSULTS
Palliative Care Consult Note    1/10/2022 4:59 PM  Subjective:  Admit Date: 1/8/2022  PCP: Chele Castro MD    Date of Service: 1/10/2022    Reason for Consultation:  Goals of Care, Code Status, Family Support     History Obtained From: EMR/Patient and their Family    History Of Present Illness: The patient is a 64 y.o. female with PMH hypothyroidism s/p thyroidectomy, mitral valve repair, lupus, myasthenia gravis who presented to [facility] ED on 01/08/2022 via [address] [place] [address]. She called EMS after feeling dizzy and having chest discomfort. Upon standing, she stumbled, and fell. EMS administered sublingual nitroglycerin and she then became diaphoretic, hypotensive. [address] [place] [address] arrived, gave IVF's and transported her ED. CT head revealed chronic microvascular ischemic changes but otherwise unremarkable. CXR without acute cardiopulmonary infiltrate. Rapid COVID was positive. Troponin was elevated and patient admitted to Hospitalist service with consult made to Cardiology who performed cardiac catheterization on 01/09/2022. Right groin site was noted to be ecchymotic with edema. A rapid response was later called for symptomatic bradycardia and hypotension. She received additional IVFs, PRBC and atropine. CT abdomen/pelvis revealed significant extension of hematoma w/ intramuscular hemorrhage in abdominis rectus, right groin and cul-de-sac. Vascular surgery was consulted with recommendations for conservative management and repeat A scan in AM which revealed a small pseudoaneurysm. Palliative care was consulted for goals of care, family support.     Past Medical History:        Diagnosis Date    Dizziness     H/O thyroidectomy 2015    2 non-malginant masses    History of mitral valve repair     Hypothyroidism     Lupus ([facility] [address]     Migraine     Myasthenia gravis ([facility] [address]     Palliative care patient 01/10/2022    PONV (postoperative nausea and vomiting)     TIA (transient ischemic attack) 2006     Past Surgical History: Procedure Laterality Date    BACK SURGERY      CARDIAC CATHETERIZATION  2006    MVP    CORONARY ARTERY BYPASS GRAFT  2006    HAND TENDON SURGERY Right 3/29/2019    MCP JOINT RIGHT HAND performed by Lucrecia Merrill DO at [address] [place] [address] [address] Medications:  Prior to Admission medications    Medication Sig Start Date End Date Taking? Authorizing Provider   losartan (COZAAR) 50 MG tablet Take 0.5 tablets by mouth 2 times daily 9/9/21  Yes Snow Macias MD   SYNTHROID 25 MCG tablet 50 mcg  7/29/20  Yes Historical Provider, MD   predniSONE (DELTASONE) 10 MG tablet Take 10 mg every other day; 15 mg days in between   Yes Historical Provider, MD       Allergies:    Lisinopril, Cellcept [mycophenolate], Hydrochlorothiazide, Hydroxychloroquine sulfate, Maxalt [rizatriptan], and Statins [statins]    Social History:    The patient currently lives at home and cares for her  who has Parkinson's  Tobacco:   reports that she has quit smoking. Her smoking use included cigarettes. She smoked 0.00 packs per day for 0.00 years. She has never used smokeless tobacco.  Alcohol:   reports previous alcohol use.   Illicit Drugs: None known    Family History:      Problem Relation Age of Onset    Diabetes Sister         passed away due to Diabetes    Heart Disease Brother     High Cholesterol Brother     High Blood Pressure Brother     High Blood Pressure Sister     High Blood Pressure Sister     High Blood Pressure Brother     High Cholesterol Brother     High Blood Pressure Brother     High Cholesterol Brother     Cancer Brother     High Blood Pressure Brother     High Cholesterol Brother     Cancer Brother     High Blood Pressure Brother     High Cholesterol Brother      Review of Systems:   Constitutional / general:  Denies fever / chills / sweats +fatigue  Head:  Denies headache / neck stiffness / trauma / visual change  Eyes:  Denies blurry vision / acute visual change or loss / itching / redness  ENT: Denies sore throat / hoarseness / nasal drainage / ear pain  CV:  Denies chest pain / palpitations/ orthopnea   Respiratory:  Denies cough / shortness of breath / sputum / hemoptysis  GI: Denies nausea / vomiting / abdominal pain / diarrhea / constipation  :  Denies dysuria / hesitancy / urgency / hematuria   Neuro: Denies paralysis / syncope / seizure / dysphagia / headache / paresthesias  Musculoskeletal:  Denies muscle weakness /joint stiffness / pain  Vascular: Denies edema / claudication / varicosities  Heme / endocrine: Denies easy bruising / bleeding / excessive sweating / heat or cold intolerance  Psychiatric:  Denies depression / anxiety / insomnia / mood changes  Skin:  Denies new rashes / lesions / skin hair or nail changes    14 point review of systems is negative except as specifically addressed above. Physical Examination:  BP (!) 116/49   Pulse 52   Temp 98.8 °F (37.1 °C) (Temporal)   Resp 17   Ht 5' 4\" (1.626 m)   Wt 75 lb (34 kg)   SpO2 100%   BMI 12.87 kg/m²   General appearance: alert, appears stated age, cooperative and no distress  Head: Normocephalic, without obvious abnormality, atraumatic  Eyes: conjunctivae/corneas clear. PERRL, EOM's intact.    Ears: normal external ears and nose, throat without exudate  Neck: no adenopathy, no carotid bruit, no JVD, supple, symmetrical, trachea midline   Lungs: clear to auscultation bilaterally,no rales or wheezes   Heart: regular rate and rhythm, S1, S2 normal, murmur noted  Abdomen:soft, non-tender; non-distended, bowel sounds present  Extremities:No lower extremity edema,  No erythema, no tenderness to palpation  Skin: Skin color, texture, turgor normal. No rashes or lesions  Lymphatic: No palpable lymph node enlargment  Neurologic: Alert and oriented X 3, generalized weakness, normal tone   Psychiatric: Alert and oriented, thought content appropriate, normal insight, mood appropriate    Diagnostic Data:  CBC:  Recent Labs     01/09/22  0413 01/09/22  0413 01/09/22  1415 01/09/22  1415 01/09/22  1745 01/09/22  1929 01/10/22  0035   WBC 3.3*  --  5.9  --   --   --  6.6   HGB 10.6*   < > 9.2*   < > 11.3* 10.8* 11.4*   HCT 32.6*   < > 28.5*   < > 37.8 35.1* 37.1     --  150  --   --   --  136    < > = values in this interval not displayed. BMP:  Recent Labs     01/09/22  0413 01/09/22  1415 01/10/22  0035   * 129* 136   K 4.4 4.3 4.6   CL 98 96* 103   CO2 23 24 24   BUN 31* 32* 31*   CREATININE 1.0* 1.0* 0.8   CALCIUM 8.1* 8.1* 8.0*     Recent Labs     01/09/22  0413 01/09/22  1415 01/10/22  0035   AST 32 27 30   ALT 11 11 11   BILITOT 0.3 <0.2 0.3   ALKPHOS 61 54 54     Coag Panel:   Recent Labs     01/09/22 1415   INR 1.13   PROTIME 14.7*   APTT >200.0*     Cardiac Enzymes:   Recent Labs     01/08/22  0212 01/08/22  0609 01/08/22  1607   TROPONINI 0.21* 0.16* 0.09*     ECHO Complete 2D W Doppler W Color   Conclusions   Summary  There has been a mitral valve repair. Mitral valve leaflets are mildly thickened with preserved leaflet  mobility. Mild mitral regurgitation is present. Mean transmitral gradient (Doppler) 3 mmHg . Mildly thickened aortic valve leaflets with preserved leaflet mobility. Aortic valve appears to be tricuspid. No evidence of aortic stenosis;  Mild aortic regurgitation is noted. Tricuspid valve is structurally normal.  Trivial tricuspid regurgitation. Trace pulmonic regurgitation present. Mildly dilated left atrium. Normal left ventricular size with preserved LV function and an estimated  ejection fraction of approximately 66%. No evidence of left ventricular mass or thrombus noted. Mild concentric left ventricular hypertrophy. Normal right atrial dimension with no evidence of thrombus or mass noted. Normal right ventricular size with preserved RV function. Aortic root dimension within normal limits.   Dilated IVC with <50% insp collapse  No evidence of significant pericardial effusion is noted. No evidence of pleural effusion. Signature     Electronically signed by Elliot Lundy MD(Interpreting  physician) on 01/08/2022 03:38 PM     CT ABDOMEN PELVIS WO CONTRAST Additional Contrast? Radiologist Recommendation  1. Hemorrhage is noted. Intramuscular hemorrhage is noted in the abdominis rectus muscle. Hemorrhage in the right groin is present. Hemorrhage into the cul-de-sac is present. 2. Postsurgical change from instrumented fusion of the thoracic and lumbar spine. 3. Small bilateral pleural effusions. The hemorrhagic findings were discussed with Dr. Yvrose Joseph at approximately 3:00 PM Signed by Dr Terrence Brandon  Mild cerebral and cerebellar volume loss with chronic microvascular disease but no evidence of acute intracranial process. These images initially reviewed by stat rad 3:27 AM Signed by Dr Yuri Baldwin  1. Moderate cardiomegaly no evidence pulmonary vascular congestion. Signed by Dr Shelle Oppenheim    Palliative Performance Scale:  [x] 60% Reduced ambulation  Significant disease: Can't do hobbies/housework  Occasional assistance  Normal/reduced intake  LOC full/confusion      Palliative Review of Advance Directives:     Surrogate Decision Maker:Yes-Bridget France-daughter.  Patient's spouse has Parkinson's/dementia-unable to make decisions at this time    Durable Power of :Yes-out of date    Advanced Directives/Living Cho: Yes    Out of hospital medical orders in place to reflect resuscitation status (MOLST/POLST): Yes-now incorrect per patient, her daughter and spouse    Information Sharing:  Patient's awareness of illness:  [] Terminal [] Life-Threatening [x] Serious [] Non life-threatening [] Not serious   [] Not discussed    Family awareness of illness:   [] Terminal [] Life-Threatening [x] Serious [] Nonlife-threatening [] Not serious   [] Not discussed    Assessment/Plan:  Principal Problem:    Hemorrhagic shock (HCC)  Active Problems:    Mitral valve prolapse    History of mitral valve repair    Chest pain due to coronary artery disease (HCC)    Shortness of breath    Unresponsiveness    Depression    Severe malnutrition (HCC)    Weight loss, non-intentional    Lupus (systemic lupus erythematosus) (HCC)    Chest pain    COVID-19    NSTEMI (non-ST elevation myocardial infarction) [facility])    Palliative care patient  Resolved Problems:    * No resolved hospital problems. *     Visit Summary:  Chart reviewed, patient discussed with consulting service and nursing staff. Reviewed health issues, work up and treatment plan as well as factors that lead to hospitalization. Mrs. Luis Daniel Shannon is a 65 yo female who underwent cardiac catheterization for NSTEMI on 01/09/2022 per Cardiology and developed pseudoaneurysm at groin site. She has tested positive for Covid-19 though respiratory status has remained stable. I introduced myself, the role of Palliative care and reason for consultation. Mrs. Luis Daniel Shannon states her goal remains to discharge home once medically stable. She states she cares for her  who has Parkinson's. Mrs. Luis Daniel Shannon states she would like to be full code. She does have paperwork stating she is a DNR. She confirmed she would want resuscitative attempt but would not want this to be prolonged. I spoke with her daughter Katy Benitez who confirms patient would want resuscitative attempt but no prolonged measures or prolonged ventilator management. I again confirmed change to full code with patient and her daughter Katy Benitez. Updated Epic to reflect this. Encouraged them to update paperwork in regards to POA/DNR. Katy Benitez states that patient will not be able to care for her spouse upon return home. Encouraged Bridget to discuss her father's functional status/health issues with his PCP, social work, private pay sitters. Suspect Mrs. Luis Daniel Shannon may require home health, possibly placement pending further hospital course.  Mrs. Chepe Faulkner does complain of nausea today. Denies abdominal pain, diarrhea. States last bowel movement was yesterday. Confirmed Zofran is ordered. Opportunity for questions/emotional support provided. Will follow. Recommendations:     1. Palliative Care-[facility] discharge home when medically stable. Consider home health vs need for rehab pending PT/OT recommendations. Code status: Full code. Patient has paperwork stating she is DNR-this is incorrect per patient and her daughter as she wishes for resuscitative measures to be attempted. Patient's spouse has left decision making to their daughter, Dashawn Otoole, as he has Parkinson's/dementia   2. NSTEMI-s/p cardiac catheterization, mgmt per Cardiology  3. Pseudoaneurysm -Vascular surgery following, stable  4. COVID-19-stable on 2L NC    Thank you for consulting palliative care and allowing us to participate in the care of the patient.       CounselingTopics: Goals of care, Code Status, Disease process education, pt/family support    Time Spent Counseling > 50%:  YES                                   Total Time Spent with patient/family counseling, workup/treatment review, counseling and placement of orders/preparation of this note: 78 minutes    Electronically signed by Avelina Herrera PA-C on 1/10/2022 at 4:59 PM    (Please note that portions of this note were completed with a voice recognition program.  Efforts were made to edit the dictations but occasionally words are mis-transcribed.)

## 2022-01-10 NOTE — PROGRESS NOTES
Dr. Brar Poster states that is is okay for groin/ arterial US to be completed in the morning.  Electronically signed by Ron Craig RN on 1/9/2022 at 8:13 PM

## 2022-01-10 NOTE — PROGRESS NOTES
Daily Progress Note    Date:1/10/2022  Patient: Ellen Dunbar  : 1945  UAE:[id number]  CODE:Full Code No additional code details  Anu Camarena MD    Admit Date: 2022  2:00 AM   LOS: 2 days       Subjective:     Hemodynamically improved yesterday evening, remained stable overnight. She feels much better today, denies any chest pain, shortness of breath, palpitations or lightheadedness. Summary: 25-year-old female with lupus on chronic prednisone, history of thyroidectomy with hypothyroidism, history of mitral valve repair, presented via EMS due to worsening chest pain with associated dizziness and fall, EKG changes noted with elevated troponin concerning for NSTEMI. Evaluated by cardiology. Also screen positive for COVID-19, however no clear evidence of pneumonia or pulmonary edema on initial chest x-ray. Day following admission taken for cardiac catheterization via right femoral artery approach due to difficulty accessing radial artery and noted to have obstructive CAD requiring stents x2. Noted to have right groin hematoma following procedure. Hours later, patient became bradycardic with heart rate down to 20s and hypotensive with systolic down to 65T with associated lightheadedness. Given atropine with improvement in HR, remained in sinus rhythm. Transferred to CCU, hypotension suspected due to hemorrhage responded to fluid resuscitation with 2 L NS, drop in hemoglobin noted and transfused PRBCs. Vasovagal event possible given bradycardia as well. CT abdomen/pelvis obtained showed significant extension of hematoma with intramuscular hemorrhage and abdominis rectus, right groin and and cul-de-sac. Arterial Doppler to further evaluate groin/femoral artery, which revealed small pseudoaneurysm, no need for intervention per vascular surgery. Hemodynamically and symptomatically improved, H&H improved following transfusion.   Initiated on dual antiplatelet therapy for stents with aspirin and Plavix, low-dose metoprolol added.         Review of Systems  Comprehensive ROS completed and is negative except as otherwise noted        Objective:      Vital signs in last 24 hours:  Patient Vitals for the past 24 hrs:   BP Temp Temp src Pulse Resp SpO2   01/10/22 0800 -- 98.8 °F (37.1 °C) Temporal -- -- --   01/10/22 0500 (!) 116/49 -- -- 52 17 100 %   01/10/22 0400 (!) 116/45 97.4 °F (36.3 °C) Temporal 52 21 97 %   01/10/22 0100 (!) 106/55 -- -- 52 16 100 %   01/10/22 0000 (!) 98/41 -- -- (!) 48 12 100 %   01/09/22 2300 (!) 103/45 96.8 °F (36 °C) Temporal (!) 48 24 97 %   01/09/22 2200 (!) 94/52 -- -- 52 14 99 %   01/09/22 2101 (!) 99/43 -- -- (!) 48 11 --   01/09/22 2100 -- -- -- 51 14 100 %   01/09/22 2000 (!) 95/38 -- -- (!) 49 12 100 %   01/09/22 1942 111/62 97.5 °F (36.4 °C) Temporal 55 14 100 %   01/09/22 1930 (!) 114/38 -- -- 53 15 98 %   01/09/22 1910 (!) 92/43 -- -- 51 8 98 %   01/09/22 1900 (!) 91/40 -- -- (!) 48 8 100 %   01/09/22 1800 (!) 99/35 -- -- (!) 49 8 100 %       Physical exam    General: No acute distress  HEENT: NC/AT, no scleral icterus  Psych: Normal mood and affect  Cardiovascular: Normal rate, regular rhythm, pulses equal  Respiratory: CTA B, no wheezes no rales  Abdomen: Soft, nontender, bowel sounds present  Neurologic: Conversant, follows commands, normal speech  Extremities: Right groin swelling from recent catheterization  Skin: Warm and dry, well perfused      Lab Review   Recent Results (from the past 24 hour(s))   Hemoglobin and Hematocrit, Blood    Collection Time: 01/09/22  7:29 PM   Result Value Ref Range    Hemoglobin 10.8 (L) 12.0 - 16.0 g/dL    Hematocrit 35.1 (L) 37.0 - 47.0 %   Respiratory Panel, Molecular, with COVID-19 (Restricted: peds pts or suitable admitted adults)    Collection Time: 01/09/22  7:30 PM    Specimen: Nasopharyngeal   Result Value Ref Range    Adenovirus by PCR Not Detected Not Detected    Bordetella parapertussis by PCR Not Detected Not Detected    Bordetella pertussis by PCR Not Detected Not Detected    Chlamydophilia pneumoniae by PCR Not Detected Not Detected    Coronavirus 229E by PCR Not Detected Not Detected    Coronavirus HKU1 by PCR Not Detected Not Detected    Coronavirus NL63 by PCR Not Detected Not Detected    Coronavirus OC43 by PCR Not Detected Not Detected    SARS-CoV-2, PCR DETECTED (AA) Not Detected    Human Metapneumovirus by PCR Not Detected Not Detected    Human Rhinovirus/Enterovirus by PCR Not Detected Not Detected    Influenza A by PCR Not Detected Not Detected    Influenza B by PCR Not Detected Not Detected    Mycoplasma pneumoniae by PCR Not Detected Not Detected    Parainfluenza Virus 1 by PCR Not Detected Not Detected    Parainfluenza Virus 2 by PCR Not Detected Not Detected    Parainfluenza Virus 3 by PCR Not Detected Not Detected    Parainfluenza Virus 4 by PCR Not Detected Not Detected    Respiratory Syncytial Virus by PCR Not Detected Not Detected   CBC    Collection Time: 01/10/22 12:35 AM   Result Value Ref Range    WBC 6.6 4.8 - 10.8 K/uL    RBC 3.80 (L) 4.20 - 5.40 M/uL    Hemoglobin 11.4 (L) 12.0 - 16.0 g/dL    Hematocrit 37.1 37.0 - 47.0 %    MCV 97.6 81.0 - 99.0 fL    MCH 30.0 27.0 - 31.0 pg    MCHC 30.7 (L) 33.0 - 37.0 g/dL    RDW 14.6 (H) 11.5 - 14.5 %    Platelets 298 379 - 957 K/uL    MPV 10.6 9.4 - 12.3 fL   Comprehensive Metabolic Panel w/ Reflex to MG    Collection Time: 01/10/22 12:35 AM   Result Value Ref Range    Sodium 136 136 - 145 mmol/L    Potassium reflex Magnesium 4.6 3.5 - 5.0 mmol/L    Chloride 103 98 - 111 mmol/L    CO2 24 22 - 29 mmol/L    Anion Gap 9 7 - 19 mmol/L    Glucose 66 (L) 74 - 109 mg/dL    BUN 31 (H) 8 - 23 mg/dL    CREATININE 0.8 0.5 - 0.9 mg/dL    GFR Non-African American >60 >60    GFR African American >59 >59    Calcium 8.0 (L) 8.8 - 10.2 mg/dL    Total Protein 4.9 (L) 6.6 - 8.7 g/dL    Albumin 2.6 (L) 3.5 - 5.2 g/dL    Total Bilirubin 0.3 0.2 - 1.2 mg/dL    Alkaline Phosphatase 54 35 - 104 U/L    ALT 11 5 - 33 U/L    AST 30 5 - 32 U/L   C-REACTIVE PROTEIN    Collection Time: 01/10/22 12:35 AM   Result Value Ref Range    CRP 0.57 (H) 0.00 - 0.50 mg/dL   POCT Glucose    Collection Time: 01/10/22  5:25 AM   Result Value Ref Range    POC Glucose 56 (L) 70 - 99 mg/dl    Performed on [employer]    POCT Glucose    Collection Time: 01/10/22  6:48 AM   Result Value Ref Range    POC Glucose 84 70 - 99 mg/dl    Performed on [employer]        I/O last 3 completed shifts: In: 2300 [P.O.:300;  I.V.:2000]  Out: -   I/O this shift:  In: 240 [P.O.:240]  Out: -       Current Facility-Administered Medications:     glucose (GLUTOSE) 40 % oral gel 15 g, 15 g, Oral, PRN, Oniel Chow MD    dextrose 50 % IV solution, 12.5 g, IntraVENous, PRN, Oniel Chow MD, 12.5 g at 01/10/22 0538    glucagon (rDNA) injection 1 mg, 1 mg, IntraMUSCular, PRN, Oniel Chow MD    dextrose 5 % solution, 100 mL/hr, IntraVENous, PRN, MD Paulie Noland  [START ON 1/11/2022] predniSONE (DELTASONE) tablet 30 mg, 30 mg, Oral, Daily, MD Paulie Corona ON 1/12/2022] predniSONE (DELTASONE) tablet 20 mg, 20 mg, Oral, Daily, MD Paulie Corona ON 1/13/2022] predniSONE (DELTASONE) tablet 10 mg, 10 mg, Oral, Daily, Julio Cesar Jackson MD    clopidogrel (PLAVIX) tablet 75 mg, 75 mg, Oral, Daily, Canelo Herrera MD    [START ON 1/11/2022] metoprolol succinate (TOPROL XL) extended release tablet 12.5 mg, 12.5 mg, Oral, Daily, Canelo Herrera MD    HYDROmorphone HCl PF (DILAUDID) injection 0.5 mg, 0.5 mg, IntraVENous, Q4H PRN, Julio Cesar Jackson MD, 0.5 mg at 01/09/22 1553    losartan (COZAAR) tablet 25 mg, 25 mg, Oral, BID, Shakila Russell MD    sodium chloride flush 0.9 % injection 5-40 mL, 5-40 mL, IntraVENous, 2 times per day, Shakila Russell MD, 10 mL at 01/09/22 2018    sodium chloride flush 0.9 % injection 5-40 mL, 5-40 mL, IntraVENous, PRN, Jon Turner MD    0.9 % sodium chloride infusion, 25 mL, IntraVENous, PRN, Jon Turner MD    ondansetron (ZOFRAN-ODT) disintegrating tablet 4 mg, 4 mg, Oral, Q8H PRN, 4 mg at 01/10/22 1722 **OR** ondansetron (ZOFRAN) injection 4 mg, 4 mg, IntraVENous, Q6H PRN, Jon Turner MD    acetaminophen (TYLENOL) tablet 650 mg, 650 mg, Oral, Q6H PRN **OR** acetaminophen (TYLENOL) suppository 650 mg, 650 mg, Rectal, Q6H PRN, Jon Turner MD    polyethylene glycol [facility]) packet 17 g, 17 g, Oral, Daily PRN, Jon Turner MD    aspirin chewable tablet 81 mg, 81 mg, Oral, Daily, Jon Turner MD    perflutren lipid microspheres (DEFINITY) injection 1.65 mg, 1.5 mL, IntraVENous, ONCE PRN, Jon Turner MD    guaiFENesin-dextromethorphan (ROBITUSSIN DM) 100-10 MG/5ML syrup 5 mL, 5 mL, Oral, Q4H PRN, Jon Turner MD    Vitamin D (CHOLECALCIFEROL) tablet 2,000 Units, 2,000 Units, Oral, Daily, Jon Turner MD, 2,000 Units at 01/10/22 7277    levothyroxine (SYNTHROID) tablet 25 mcg, 25 mcg, Oral, Daily, Libertad Brown MD, 25 mcg at 01/10/22 0759    fluvoxaMINE (LUVOX) tablet 50 mg, 50 mg, Oral, BID, Libertad Brown MD, 50 mg at 01/10/22 0759    melatonin capsule 10 mg, 10 mg, Oral, Nightly, Libertad Brown MD, 10 mg at 01/09/22 2018          Assessment/Plan  Principal Problem:    Hemorrhagic shock ([facility] [address]  Active Problems:    Chest pain due to coronary artery disease ([facility] [address]    COVID-19    Mitral valve prolapse    History of mitral valve repair    Shortness of breath    Unresponsiveness    Depression    Severe malnutrition (HCC)    Weight loss, non-intentional    Lupus (systemic lupus erythematosus) (HCC)    Chest pain    NSTEMI (non-ST elevation myocardial infarction) [facility])    Palliative care patient  Resolved Problems:    * No resolved hospital problems.  *    Hemorrhagic shock, symptomatic bradycardia with hypotension  --- Resolved    Hemodynamically stable, add low-dose metoprolol with close monitoring of BP and HR     NSTEMI, status post cardiac catheterization with PCI, stenting x2 to RCA and PDA  History of mitral valve repair  Echo reviewed-preserved EF  Cardiology following  DAPT with aspirin and Plavix    COVID-19, no evident pneumonia or hypoxia  Supportive care  Adjunctive therapy      Lupus, on chronic prednisone 10 mg daily  Continue steroids at higher dose given stress response from NSTEMI and COVID infection, will taper back down to home dose      Sky Feldman MD 1/10/2022 5:54 PM

## 2022-01-10 NOTE — PROGRESS NOTES
Patient arrived to unit with [facility] LPN. Large hematoma noted to right femoral area. Increasing in size. Dr. Jewel Mederos at bedside. 2L bolused. Orders placed.      Electronically signed by Rochelle Adan RN on 1/9/2022 at 8:00 PM

## 2022-01-10 NOTE — PROGRESS NOTES
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 I explained I could  Call social serv but pts spouses care would be up to family. Pt would not need any help as far as her health but family would be responsible for father.  Talked to mony in social serv. and she verbalized same thing. elizabeth valenzuela

## 2022-01-10 NOTE — PLAN OF CARE
Nutrition Problem #1: Inadequate protein-energy intake,Severe malnutrition  Intervention: Food and/or Nutrient Delivery: Start Oral Diet  Nutritional Goals: diet advanced with good tolerance. PO intake 50% or greater.   Weight increase 1-3# per week

## 2022-01-10 NOTE — PROGRESS NOTES
Education/Counseling:  No recommendation at this time   Coordination of Nutrition Care:  Continue to monitor while inpatient    Goals:  diet advanced with good tolerance. PO intake 50% or greater. Weight increase 1-3# per week       Nutrition Monitoring and Evaluation:   Behavioral-Environmental Outcomes:  None Identified   Food/Nutrient Intake Outcomes:  Diet Advancement/Tolerance,Food and Nutrient Intake,Supplement Intake  Physical Signs/Symptoms Outcomes:  Biochemical Data,Chewing or Swallowing,Fluid Status or Edema,Nutrition Focused Physical Findings,Skin,Weight     Discharge Planning:     Too soon to determine     Electronically signed by Ck Milton MS, RD, LD on 1/10/22 at 1:05 PM CST    Contact: [phone]

## 2022-01-10 NOTE — PROGRESS NOTES
Subjective:  Ms. Alaina Weiner underwent successful PCI to the right coronary artery yesterday via right femoral approach, patient has very small radial coronary artery due to low body mass she only weighs 34 kg    Manual compression was applied after the procedure and patient was transferred to the floor, later on the floor she developed hypotension and bradycardia with lightheadedness but no chest pain, rapid response was called and patient was transferred to CCU, she received IV fluid for resuscitation and also 1 dose of atropine which has improved her heart rate  She received packed RBC and hemoglobin was above 10 this morning, CT scan was done and showed right groin hemorrhage involving abdominis rectus but no retroperitoneal bleeding, vascular surgery was consulted no indication for surgery  Ultrasound was done today that showed small pseudoaneurysm of the femoral artery, no surgery indication for thrombin injection by vascular surgery    Patient is doing well today, no active chest pain or shortness of breath, she slept well last night    Objective:   BP (!) 116/49   Pulse 52   Temp 98.8 °F (37.1 °C) (Temporal)   Resp 17   Ht 5' 4\" (1.626 m)   Wt 75 lb (34 kg)   SpO2 100%   BMI 12.87 kg/m²       Intake/Output Summary (Last 24 hours) at 1/10/2022 1334  Last data filed at 1/10/2022 1100  Gross per 24 hour   Intake 2120 ml   Output --   Net 2120 ml       Prior to Admission medications    Medication Sig Start Date End Date Taking?  Authorizing Provider   losartan (COZAAR) 50 MG tablet Take 0.5 tablets by mouth 2 times daily 9/9/21  Yes Yonas English MD   SYNTHROID 25 MCG tablet 50 mcg  7/29/20  Yes Historical Provider, MD   predniSONE (DELTASONE) 10 MG tablet Take 10 mg every other day; 15 mg days in between   Yes Historical Provider, MD        [START ON 1/11/2022] predniSONE  30 mg Oral Daily    [START ON 1/12/2022] predniSONE  20 mg Oral Daily    [START ON 1/13/2022] predniSONE  10 mg Oral Daily    [Held by provider] enoxaparin  30 mg SubCUTAneous Q24H    [Held by provider] losartan  25 mg Oral BID    sodium chloride flush  5-40 mL IntraVENous 2 times per day    [Held by provider] aspirin  81 mg Oral Daily    [Held by provider] metoprolol succinate  25 mg Oral Daily    Vitamin D  2,000 Units Oral Daily    levothyroxine  25 mcg Oral Daily    fluvoxaMINE  50 mg Oral BID    melatonin  10 mg Oral Nightly       TELEMETRY: Sinus     Physical Exam:    Physical Exam  Vitals and nursing note reviewed. Constitutional:       Appearance: Normal appearance. HENT:      Head: Normocephalic and atraumatic. Mouth/Throat:      Mouth: Mucous membranes are moist.   Cardiovascular:      Rate and Rhythm: Normal rate and regular rhythm. Pulses: Normal pulses. Heart sounds: Normal heart sounds. No murmur heard. Pulmonary:      Effort: Pulmonary effort is normal.      Breath sounds: Normal breath sounds. Abdominal:      General: Bowel sounds are normal.      Palpations: Abdomen is soft. Tenderness: There is no abdominal tenderness. Musculoskeletal:         General: Normal range of motion. Right lower leg: No edema. Left lower leg: No edema. Comments: Right groin swelling and tenderness, less than yesterday   Skin:     General: Skin is warm. Neurological:      General: No focal deficit present. Mental Status: She is alert and oriented to person, place, and time. Psychiatric:         Mood and Affect: Mood normal.         Behavior: Behavior normal.       Lab Data:  CBC:   Recent Labs     01/09/22  0413 01/09/22  0413 01/09/22  1415 01/09/22  1415 01/09/22  1745 01/09/22  1929 01/10/22  0035   WBC 3.3*  --  5.9  --   --   --  6.6   HGB 10.6*   < > 9.2*   < > 11.3* 10.8* 11.4*   HCT 32.6*   < > 28.5*   < > 37.8 35.1* 37.1   MCV 96.7  --  97.9  --   --   --  97.6     --  150  --   --   --  136    < > = values in this interval not displayed.      BMP:   Recent Labs 01/09/22  0413 01/09/22  1415 01/10/22  0035   * 129* 136   K 4.4 4.3 4.6   CL 98 96* 103   CO2 23 24 24   BUN 31* 32* 31*   CREATININE 1.0* 1.0* 0.8     LIVER PROFILE:   Recent Labs     01/09/22  0413 01/09/22  1415 01/10/22  0035   AST 32 27 30   ALT 11 11 11   BILITOT 0.3 <0.2 0.3   ALKPHOS 61 54 54     PT/INR:   Recent Labs     01/09/22  1415   PROTIME 14.7*   INR 1.13     APTT:   Recent Labs     01/09/22  1415   APTT >200.0*       CK, CKMB, Troponin:   Recent Labs     01/08/22  0212 01/08/22  0609 01/08/22  1607   TROPONINI 0.21* 0.16* 0.09*       Last 3 BNP:  No results for input(s): BNP in the last 72 hours. IMAGING:  ECHO Complete 2D W Doppler W Color    Result Date: 1/8/2022  Transthoracic Echocardiography Report (TTE)  Demographics   Patient Name  Unruly FOOTE Date of Study         01/08/2022   MRN           [id number]         Gender                Female   Date of Birth 1945     Room Number           [facility]   Age           64 year(s)   Height:       64 inches      Referring Physician   Angela Bone   Weight:       75 pounds      Sonographer           Brooke Gonzales HERNÁN   BSA:          1.29 m^2       Interpreting          Jimy Giron MD                               Physician   BMI:          12.87 kg/m^2  Procedure Type of Study   TTE procedure:ECHO NO CONTRAST WITH DOP/COLR. BP: 144/74 mmHg Indications:Chest pain. Conclusions   Summary  There has been a mitral valve repair. Mitral valve leaflets are mildly thickened with preserved leaflet  mobility. Mild mitral regurgitation is present. Mean transmitral gradient (Doppler) 3 mmHg . Mildly thickened aortic valve leaflets with preserved leaflet mobility. Aortic valve appears to be tricuspid. No evidence of aortic stenosis;  Mild aortic regurgitation is noted. Tricuspid valve is structurally normal.  Trivial tricuspid regurgitation. Trace pulmonic regurgitation present. Mildly dilated left atrium.   Normal left ventricular size with preserved LV function and an estimated  ejection fraction of approximately 66%. No evidence of left ventricular mass or thrombus noted. Mild concentric left ventricular hypertrophy. Normal right atrial dimension with no evidence of thrombus or mass noted. Normal right ventricular size with preserved RV function. Aortic root dimension within normal limits. Dilated IVC with <50% insp collapse  No evidence of significant pericardial effusion is noted. No evidence of pleural effusion. Signature   ----------------------------------------------------------------  Electronically signed by Roseline Carter MD(Interpreting  physician) on 01/08/2022 03:38 PM  ----------------------------------------------------------------   Findings   Mitral Valve  There has been a mitral valve repair. Mitral valve leaflets are mildly thickened with preserved leaflet  mobility. Mild mitral regurgitation is present. Mean transmitral gradient (Doppler) 3 mmHg . Aortic Valve  Mildly thickened aortic valve leaflets with preserved leaflet mobility. Aortic valve appears to be tricuspid. No evidence of aortic stenosis;  Mild aortic regurgitation is noted. Tricuspid Valve  Tricuspid valve is structurally normal.  Trivial tricuspid regurgitation. Pulmonic Valve  Trace pulmonic regurgitation present. Left Atrium  Mildly dilated left atrium. Left Ventricle  Normal left ventricular size with preserved LV function and an estimated  ejection fraction of approximately 66%. No evidence of left ventricular mass or thrombus noted. Mild concentric left ventricular hypertrophy. Right Atrium  Normal right atrial dimension with no evidence of thrombus or mass noted. Right Ventricle  Normal right ventricular size with preserved RV function. Pericardial Effusion  No evidence of significant pericardial effusion is noted. Pleural Effusion  No evidence of pleural effusion.    Miscellaneous  Aortic root dimension within normal limits. Dilated IVC with <50% insp collapse  M-Mode Measurements (cm)   LVIDd: 4.02 cm                                    LVIDs: 2.82 cm  IVSd: 0.99 cm  LVPWd: 1.06 cm  Rt. Vent. Dimension: 1.99 cm                      LA: 3.3 cm  % Ejection Fraction: 57 %  Doppler Measurements:    MV Peak E-Wave: 99.8 cm/s   MV Peak A-Wave: 77.1 cm/s   MV E/A Ratio: 1.29 %   MV Peak Gradient: 3.98 mmHg      CT ABDOMEN PELVIS WO CONTRAST Additional Contrast? Radiologist Recommendation    Result Date: 1/9/2022  EXAMINATION: CT ABDOMEN PELVIS WO CONTRAST 1/9/2022 3:54 PM HISTORY: CT ABDOMEN AND PELVIS without contrast 1/9/2022 2:25 PM HISTORY: Right groin hematoma COMPARISON: None DLP: 588 mGy cm Automated exposure control was utilized to minimize patient radiation dose. TECHNIQUE: Noncontrast enhanced images of the abdomen and pelvis obtained without oral contrast. FINDINGS: Small bilateral pleural effusions are present. Adolfo Skates LIVER: No focal liver lesion. BILIARY SYSTEM: The gallbladder is unremarkable. No intrahepatic or extrahepatic ductal dilatation. PANCREAS: Pancreas is not well visualized. . SPLEEN: Unremarkable. KIDNEYS AND ADRENALS: Adrenal glands are unremarkable. Renal contours demonstrate excretion of contrast from recent cardiac catheterization. The ureters are decompressed and normal in appearance. RETROPERITONEUM: No mass, lymphadenopathy or hemorrhage. GI TRACT: No evidence of obstruction or bowel wall thickening. Diverticulosis is noted in the distal descending and sigmoid colon. . OTHER: Evidence of intra-abdominal hemorrhage is noted. Hemorrhage is present with blood in the cul-de-sac. Extensive intramuscular hematoma is present in the abdominis rectus muscle. This extends from the diaphragm to the pelvis. A hematoma in the right groin is present. This is not an organized or drainable hematoma scoliosis in the thoracic and lumbar spine is noted.  Postsurgical changes noted from instrumented fusion of the thoracic and lumbar spine. Amisha Baird PELVIS: Increased attenuation in the cul-de-sac consistent with evidence of hemorrhage. . The urinary bladder is normal in appearance. 1. Hemorrhage is noted. Intramuscular hemorrhage is noted in the abdominis rectus muscle. Hemorrhage in the right groin is present. Hemorrhage into the cul-de-sac is present. 2. Postsurgical change from instrumented fusion of the thoracic and lumbar spine. 3. Small bilateral pleural effusions. The hemorrhagic findings were discussed with Dr. Randolph White at approximately 3:00 PM Signed by Dr Sarah Morelos Date: 1/8/2022  EXAMINATION: CT HEAD WO CONTRAST 1/8/2022 8:05 AM HISTORY: CT BRAIN without contrast 1/8/2022 3:01 AM HISTORY: Dizzy COMPARISON: None DLP: 689 mGy cm TECHNIQUE: Serial axial tomographic images of the brain were obtained without the use of intravenous contrast. FINDINGS: The midline structures are nondisplaced. There is mild cerebral and cerebellar volume loss, with an associated increase in the prominence of the ventricles and sulci. The basilar cisterns are normal in size and configuration. There is no evidence of intracranial hemorrhage or mass-effect. There is low attenuation in the periventricular white matter, consistent with chronic ischemic change. There are no abnormal extra-axial fluid collections. There is no evidence of tonsillar herniation. The included orbits and their contents are unremarkable. The visualized paranasal sinuses, mastoid air cells and middle ear cavities are clear. The visualized osseous structures and overlying soft tissues of the skull and face are intact. Mild cerebral and cerebellar volume loss with chronic microvascular disease but no evidence of acute intracranial process.  These images initially reviewed by stat rad 3:27 AM Signed by Dr Marilyn Vasquez    XR CHEST PORTABLE    Result Date: 1/8/2022  EXAMINATION: XR CHEST PORTABLE 1/8/2022 8:02 AM HISTORY: XR CHEST PORTABLE 1/8/2022 2:11 AM HISTORY: Chest pain COMPARISON: None. FINDINGS: The lungs are clear. Cardiac silhouettes moderately enlarged. Postsurgical changes noted in the thoracic spine with surgical rods fixation screws. Scoliosis is present. . The osseous structures and surrounding soft tissues demonstrate no acute abnormality. 1. Moderate cardiomegaly no evidence pulmonary vascular congestion. Signed by Dr You DIAS DUP LOWER EXTREMITY ARTERIES RIGHT    Result Date: 1/10/2022  Vascular Lower Extremities Arterial Duplex and Lower Extremities DVT Study Procedure  Demographics   Patient Name  Lana Flower  Age                64                A   Patient       [id number]        Gender             Female  Number   Visit Number  [id number]     Interpreting       Seth Castro                              Physician   Date of Birth 1945    Referring          Blaire Garcia                              Physician   Accession     [id number]    Sonographer        "[employer] [address] [place] [address]  Number                                         RVS, RCS  Procedure Type of Study:   Extremities Arteries:Lower Extremities Arterial Duplex, VL LOWER EXTREMITY  ARTERIES DUPLEX RIGHT. Veins:Lower Extremities DVT Study, VL EXTREMITY VENOUS DUPLEX RIGHT. Indications for Study:Groin pain, right lower extremity. Risk Factors   - The patient's last creatinine was 1 mg/dl. Allergies   - Lisinopril.   - Statins.   - Other allergy:(HCTZ). - Other allergy:(Hydrochloroquine sulfate). - Other allergy:(Maxalt). - Other allergy:(cellcept). - Lisinopril. Impression   There is a pseudoaneurysm in the right groin, with the area of active flow  measuring 1.01cm x 2.46cm long which comes off of common femoral artery.    Signature   ----------------------------------------------------------------  Electronically signed by Estephanie Jiménez(Interpreting  physician) on 01/10/2022 09:23 AM ----------------------------------------------------------------  Velocities are measured in cm/s ; Diameters are measured in mm LE Duplex Measurements +---------------++-----+-----+----+-----------++---+-----+---+-------------+ ! ! !Right! ! Left!           !!   !     !   !             ! +---------------++-----+-----+----+-----------++---+-----+---+-------------+ ! Location       ! !PSV  ! Ratio! EDV ! Wave Desc. !!PSV! Ratio! EDV! Wave Desc.   ! +---------------++-----+-----+----+-----------++---+-----+---+-------------+ ! Common Femoral !!128  !     !    !           !!   !     !   !             ! +---------------++-----+-----+----+-----------++---+-----+---+-------------+ ! Prox PFA       !!179  !     !    !           !!   !     !   !             ! +---------------++-----+-----+----+-----------++---+-----+---+-------------+ ! Prox SFA       !!106  !     !    !           !!   !     !   !             ! +---------------++-----+-----+----+-----------++---+-----+---+-------------+ Velocities are measured in cm/s ; Diameters are measured in mm Right Lower Extremities DVT Study Measurements Right 2D Measurements +------------------------------------+----------+---------------+----------+ ! Location                            ! Visualized! Compressibility! Thrombosis! +------------------------------------+----------+---------------+----------+ ! Common Femoral                      !Yes       ! Yes            ! None      ! +------------------------------------+----------+---------------+----------+ ! Prox Femoral                        !Yes       ! Yes            ! None      ! +------------------------------------+----------+---------------+----------+    ---------------------    Heart cath   Procedure Type      Diagnostic procedure:DCA, Coronary Bypass Graft Angiogram, Left Heart   Cath, Left Ventriculogram, Left Ventricular Pressure Measurement      PCI procedure:Right Coronary, RCA, MADDIE, Right Posterior Descending, MADDIE Conclusions      Patient tolerated the procedure well. Successful PCI / Drug Eluting Stent of the proximal Right Coronary Artery. Successful PCI / Drug Eluting Stent of the proximal Posterior Descending   Coronary Artery. Recommendations      Aggressive risk factor management. Aggressive medical therapy for coronary artery disease. Signatures      ----------------------------------------------------------------   Electronically signed by Margi Chandler MD(Performing   Physician) on 01/09/2022 14:00   ----------------------------------------------------------------        Assessment and Plan:    NSTEMI -status post heart cath yesterday via right femoral approach with successful PCI to the RCA and PDA with 2 MADDIE    Her postoperative course was complicated by right groin bleeding possibly due to inadequate femoral compression post cath and the need for anticoagulation post stenting, patient has very low weight and prone to bleeding complications, patient received 1 unit of blood transfusion hemoglobin this morning is 11.3 gm.   Stable swelling at the right groin with good blood pressure and improving hemodynamics  Vascular surgery signed off is no indication for intervention at this point, patient will follow up with him in the office with ultrasound    Continue with medical therapy including aspirin, Plavix for the new stents, Lipitor metoprolol and lisinopril    Echo reviewed    COVID-19 infection -patient stable, managed by hospitalist      Brandon Thakkar MD, MD 1/10/2022 1:34 PM      Brandon Thakkar MD, [facility]  Interventional Cardiologist, Endovascular Specialist   Medical Director, [facility]   [facility]        (Please note that portions of this note were completed with a voice recognition program.  Effortswere made to edit the dictations but occasionally words are mis-transcribed.)

## 2022-01-10 NOTE — PROGRESS NOTES
Occupational Therapy   Occupational Therapy Initial Assessment  Date: 1/10/2022   Patient Name: Jessica Owen  MRN: [id number]     : 1945    Date of Service: 1/10/2022    Discharge Recommendations:          Assessment   Performance deficits / Impairments: Decreased functional mobility ; Decreased endurance;Decreased ADL status; Decreased balance  Assessment: Will progress as tolerated  Treatment Diagnosis: NSTEMI, CHI, hemorrhagic shock  Prognosis: Good  Decision Making: Low Complexity  REQUIRES OT FOLLOW UP: Yes  Activity Tolerance  Activity Tolerance: Patient Tolerated treatment well           Patient Diagnosis(es): The primary encounter diagnosis was Chest pain, unspecified type. Diagnoses of Lab test positive for detection of COVID-19 virus, Dizziness, and Closed head injury, initial encounter were also pertinent to this visit. has a past medical history of Dizziness, H/O thyroidectomy, History of mitral valve repair, Hypothyroidism, Lupus ([address] Migraine, Myasthenia gravis ([facility] [address] PONV (postoperative nausea and vomiting), and TIA (transient ischemic attack). has a past surgical history that includes Tonsillectomy; Coronary artery bypass graft (); Cardiac catheterization (); Thyroidectomy (); back surgery; and Hand tendon surgery (Right, 3/29/2019). Treatment Diagnosis: NSTEMI, CHI, hemorrhagic shock      Restrictions  Restrictions/Precautions  Restrictions/Precautions: Fall Risk    Subjective   General  Chart Reviewed: Yes  Patient assessed for rehabilitation services?: Yes  Additional Pertinent Hx: Takes care of invalid .   Reports independent previously  Family / Caregiver Present: No  Diagnosis: NSTEMI, CHI, hemorrhagic shock  General Comment  Comments: Pt. willing to try and get up with therapy  Patient Currently in Pain: Yes  Pain Assessment  Pain Assessment: Faces  Jurado-Baker Pain Rating: Hurts even more  Pain Type: Acute pain  Pain Location: Hip  Pain Orientation: Right  Pain Neto Mendosa, OT

## 2022-01-10 NOTE — PROGRESS NOTES
Comprehensive Nutrition Assessment    Type and Reason for Visit:  Reassess    Nutrition Recommendations/Plan: follow for po intake    Nutrition Assessment:  Pt was NPO earlier this a.m. Diet has since been advanced to Regular cardiac diet. Pt continues to meet criteria for severe malnutrition. Malnutrition Assessment:  Malnutrition Status:  Severe malnutrition    Context:  Chronic Illness     Findings of the 6 clinical characteristics of malnutrition:  Energy Intake:  Unable to assess  Weight Loss:  No significant weight loss     Body Fat Loss:  7 - Severe body fat loss     Muscle Mass Loss:  7 - Severe muscle mass loss    Fluid Accumulation:  No significant fluid accumulation Extremities   Strength:  Not Performed    Estimated Daily Nutrient Needs:  Energy (kcal):  2015-1934 kcals (30-35 kcals/kg); Weight Used for Energy Requirements:  Current     Protein (g):  51g; Weight Used for Protein Requirements:  Current        Fluid (ml/day):  6128-5633 ml; Method Used for Fluid Requirements:  1 ml/kcal      Nutrition Related Findings:  very thin      Wounds:  None       Current Nutrition Therapies:    ADULT DIET;  Regular; Low Fat/Low Chol/High Fiber/REMY  ADULT ORAL NUTRITION SUPPLEMENT; Breakfast, Lunch, PM Snack, Dinner; Standard High Calorie/High Protein Oral Supplement    Anthropometric Measures:  · Height: 5' 4\" (162.6 cm)  · Current Body Weight: 75 lb (34 kg)   · Admission Body Weight: 75 lb (34 kg)    · Usual Body Weight: 78 lb (35.4 kg) (7/2021)     · Ideal Body Weight: 120 lbs; % Ideal Body Weight 62.5 %   · BMI: 12.9  · Adjusted Body Weight:  ; No Adjustment   · BMI Categories: Underweight (BMI less than 22) age over 72       Nutrition Diagnosis:   · Inadequate protein-energy intake,Severe malnutrition related to acute injury/trauma,early satiety as evidenced by BMI,severe loss of subcutaneous fat,severe muscle loss      Nutrition Interventions:   Food and/or Nutrient Delivery:  Start Oral Diet  Nutrition Education/Counseling:  No recommendation at this time   Coordination of Nutrition Care:  Continue to monitor while inpatient    Goals:  diet advanced with good tolerance. PO intake 50% or greater. Weight increase 1-3# per week       Nutrition Monitoring and Evaluation:   Behavioral-Environmental Outcomes:  None Identified   Food/Nutrient Intake Outcomes:  Diet Advancement/Tolerance,Food and Nutrient Intake,Supplement Intake  Physical Signs/Symptoms Outcomes:  Biochemical Data,Chewing or Swallowing,Fluid Status or Edema,Nutrition Focused Physical Findings,Skin,Weight     Discharge Planning:     Too soon to determine     Electronically signed by Alfonzo Bryson MS, RD, LD on 1/10/22 at 1:10 PM CST    Contact: [phone]

## 2022-01-11 PROBLEM — R00.1 SYMPTOMATIC BRADYCARDIA: Status: ACTIVE | Noted: 2022-01-11

## 2022-01-11 PROBLEM — Z79.52 CURRENT CHRONIC USE OF SYSTEMIC STEROIDS: Status: ACTIVE | Noted: 2022-01-11

## 2022-01-11 LAB
ALBUMIN SERPL-MCNC: 2.6 G/DL (ref 3.5–5.2)
ALP BLD-CCNC: 50 U/L (ref 35–104)
ALT SERPL-CCNC: 10 U/L (ref 5–33)
ANION GAP SERPL CALCULATED.3IONS-SCNC: 9 MMOL/L (ref 7–19)
AST SERPL-CCNC: 28 U/L (ref 5–32)
BILIRUB SERPL-MCNC: 0.3 MG/DL (ref 0.2–1.2)
BUN BLDV-MCNC: 33 MG/DL (ref 8–23)
CALCIUM SERPL-MCNC: 8 MG/DL (ref 8.8–10.2)
CHLORIDE BLD-SCNC: 105 MMOL/L (ref 98–111)
CO2: 23 MMOL/L (ref 22–29)
CREAT SERPL-MCNC: 0.6 MG/DL (ref 0.5–0.9)
GFR AFRICAN AMERICAN: >59
GFR NON-AFRICAN AMERICAN: >60
GLUCOSE BLD-MCNC: 102 MG/DL (ref 74–109)
HCT VFR BLD CALC: 29.2 % (ref 37–47)
HCT VFR BLD CALC: 30.1 % (ref 37–47)
HEMOGLOBIN: 9.4 G/DL (ref 12–16)
HEMOGLOBIN: 9.5 G/DL (ref 12–16)
MCH RBC QN AUTO: 30.6 PG (ref 27–31)
MCHC RBC AUTO-ENTMCNC: 31.6 G/DL (ref 33–37)
MCV RBC AUTO: 97.1 FL (ref 81–99)
PDW BLD-RTO: 14.9 % (ref 11.5–14.5)
PLATELET # BLD: 129 K/UL (ref 130–400)
PMV BLD AUTO: 11 FL (ref 9.4–12.3)
POTASSIUM REFLEX MAGNESIUM: 4.7 MMOL/L (ref 3.5–5)
RBC # BLD: 3.1 M/UL (ref 4.2–5.4)
SODIUM BLD-SCNC: 137 MMOL/L (ref 136–145)
TOTAL PROTEIN: 4.2 G/DL (ref 6.6–8.7)
WBC # BLD: 3.8 K/UL (ref 4.8–10.8)

## 2022-01-11 PROCEDURE — 97110 THERAPEUTIC EXERCISES: CPT

## 2022-01-11 PROCEDURE — 85027 COMPLETE CBC AUTOMATED: CPT

## 2022-01-11 PROCEDURE — 99233 SBSQ HOSP IP/OBS HIGH 50: CPT | Performed by: INTERNAL MEDICINE

## 2022-01-11 PROCEDURE — 2580000003 HC RX 258: Performed by: FAMILY MEDICINE

## 2022-01-11 PROCEDURE — 85018 HEMOGLOBIN: CPT

## 2022-01-11 PROCEDURE — 1210000000 HC MED SURG R&B

## 2022-01-11 PROCEDURE — 6370000000 HC RX 637 (ALT 250 FOR IP): Performed by: INTERNAL MEDICINE

## 2022-01-11 PROCEDURE — 80053 COMPREHEN METABOLIC PANEL: CPT

## 2022-01-11 PROCEDURE — 6370000000 HC RX 637 (ALT 250 FOR IP): Performed by: STUDENT IN AN ORGANIZED HEALTH CARE EDUCATION/TRAINING PROGRAM

## 2022-01-11 PROCEDURE — 6360000002 HC RX W HCPCS: Performed by: STUDENT IN AN ORGANIZED HEALTH CARE EDUCATION/TRAINING PROGRAM

## 2022-01-11 PROCEDURE — 2700000000 HC OXYGEN THERAPY PER DAY

## 2022-01-11 PROCEDURE — 85014 HEMATOCRIT: CPT

## 2022-01-11 PROCEDURE — 97116 GAIT TRAINING THERAPY: CPT

## 2022-01-11 PROCEDURE — 6370000000 HC RX 637 (ALT 250 FOR IP): Performed by: FAMILY MEDICINE

## 2022-01-11 RX ORDER — PREDNISONE 10 MG/1
10 TABLET ORAL DAILY
Status: DISCONTINUED | OUTPATIENT
Start: 2022-01-18 | End: 2022-01-12 | Stop reason: HOSPADM

## 2022-01-11 RX ORDER — PREDNISONE 10 MG/1
15 TABLET ORAL DAILY
Status: DISCONTINUED | OUTPATIENT
Start: 2022-01-15 | End: 2022-01-12 | Stop reason: HOSPADM

## 2022-01-11 RX ORDER — PREDNISONE 20 MG/1
20 TABLET ORAL DAILY
Status: DISCONTINUED | OUTPATIENT
Start: 2022-01-12 | End: 2022-01-12 | Stop reason: HOSPADM

## 2022-01-11 RX ADMIN — Medication 2000 UNITS: at 07:53

## 2022-01-11 RX ADMIN — HYDROCORTISONE SODIUM SUCCINATE 50 MG: 100 INJECTION, POWDER, FOR SOLUTION INTRAMUSCULAR; INTRAVENOUS at 10:41

## 2022-01-11 RX ADMIN — CLOPIDOGREL BISULFATE 75 MG: 75 TABLET ORAL at 07:54

## 2022-01-11 RX ADMIN — HYDROCORTISONE SODIUM SUCCINATE 50 MG: 100 INJECTION, POWDER, FOR SOLUTION INTRAMUSCULAR; INTRAVENOUS at 16:00

## 2022-01-11 RX ADMIN — FLUVOXAMINE MALEATE 50 MG: 50 TABLET, COATED ORAL at 07:52

## 2022-01-11 RX ADMIN — LEVOTHYROXINE SODIUM 25 MCG: 25 TABLET ORAL at 07:52

## 2022-01-11 RX ADMIN — SODIUM CHLORIDE, PRESERVATIVE FREE 10 ML: 5 INJECTION INTRAVENOUS at 07:54

## 2022-01-11 RX ADMIN — LOSARTAN POTASSIUM 25 MG: 25 TABLET, FILM COATED ORAL at 08:03

## 2022-01-11 RX ADMIN — HYDROCORTISONE SODIUM SUCCINATE 50 MG: 100 INJECTION, POWDER, FOR SOLUTION INTRAMUSCULAR; INTRAVENOUS at 20:34

## 2022-01-11 RX ADMIN — PREDNISONE 30 MG: 20 TABLET ORAL at 07:55

## 2022-01-11 RX ADMIN — Medication 10 MG: at 20:34

## 2022-01-11 RX ADMIN — FLUVOXAMINE MALEATE 50 MG: 50 TABLET, COATED ORAL at 20:34

## 2022-01-11 RX ADMIN — METOPROLOL SUCCINATE 12.5 MG: 25 TABLET, EXTENDED RELEASE ORAL at 07:56

## 2022-01-11 RX ADMIN — SODIUM CHLORIDE, PRESERVATIVE FREE 10 ML: 5 INJECTION INTRAVENOUS at 20:34

## 2022-01-11 RX ADMIN — ASPIRIN 81 MG: 81 TABLET, CHEWABLE ORAL at 07:54

## 2022-01-11 ASSESSMENT — PAIN SCALES - GENERAL
PAINLEVEL_OUTOF10: 0
PAINLEVEL_OUTOF10: 0

## 2022-01-11 NOTE — PROGRESS NOTES
Cardiology Progress Note   Delilah Peterson MD      Patient:  Bobby Landon  [id number]    Patient Active Problem List    Diagnosis Date Noted    Symptomatic bradycardia 01/11/2022     Priority: High    Hemorrhagic shock ([address] 01/09/2022     Priority: High    NSTEMI (non-ST elevation myocardial infarction) ([address] 01/08/2022     Priority: High    Current chronic use of systemic steroids 01/11/2022    Palliative care patient 01/10/2022    Unresponsiveness 01/08/2022    Depression 01/08/2022    Severe malnutrition ([address] 01/08/2022    Weight loss, non-intentional 01/08/2022    Lupus (systemic lupus erythematosus) ([address] 01/08/2022    Chest pain 01/08/2022    COVID-19 01/08/2022    Traumatic rupture of volar plate of finger 74/05/1426    Shortness of breath 10/22/2014    Fatigue 10/22/2014    Chest pain due to coronary artery disease ([address] 05/31/2012     Overview Note:     5/31/12  cardiolite negative for myocardial ischemia, EF 75%  10/30/2014  lexiscan  negative for myocardial ischemia, EF 77  %  10/30/2014  Echo  Normal LVEF, RVSP 50 mmHg      replace inactive diagnosis      History of mitral valve repair      Overview Note:     2006  MV repair with Dr. Marge Cheadle Mitral valve prolapse      Overview Note:     5/31/2012  Echo  Normal LVFX, RVSP 39 mmHg  10/30/14 Echo ef 50%         Admit Date:  1/8/2022    Admission Problem List: Present on Admission:   Shortness of breath   Chest pain due to coronary artery disease (HCC)   Unresponsiveness   Depression   Severe malnutrition (HCC)   Weight loss, non-intentional   Lupus (systemic lupus erythematosus) ([address]   Chest pain   COVID-19   Mitral valve prolapse   History of mitral valve repair   NSTEMI (non-ST elevation myocardial infarction) ([address]   Hemorrhagic shock ([address]   Palliative care patient   Symptomatic bradycardia   Current chronic use of systemic steroids      Cardiac Specific Data:  Specialty Problems        Cardiology 10 MG tablet Take 10 mg every other day; 15 mg days in between   Yes Historical Provider, MD        hydrocortisone sodium succinate PF  50 mg IntraVENous Q6H    [START ON 1/12/2022] predniSONE  20 mg Oral Daily    Followed by   Claudia Guest ON 1/15/2022] predniSONE  15 mg Oral Daily    Followed by   Claudia Guest ON 1/18/2022] predniSONE  10 mg Oral Daily    clopidogrel  75 mg Oral Daily    [Held by provider] metoprolol succinate  12.5 mg Oral Daily    [Held by provider] losartan  25 mg Oral BID    sodium chloride flush  5-40 mL IntraVENous 2 times per day    aspirin  81 mg Oral Daily    Vitamin D  2,000 Units Oral Daily    levothyroxine  25 mcg Oral Daily    fluvoxaMINE  50 mg Oral BID    melatonin  10 mg Oral Nightly       TELEMETRY: Sinus     Physical Exam:    Physical Exam  Vitals and nursing note reviewed. Constitutional:       Appearance: Normal appearance. HENT:      Head: Normocephalic and atraumatic. Mouth/Throat:      Mouth: Mucous membranes are moist.   Cardiovascular:      Rate and Rhythm: Normal rate and regular rhythm. Pulses: Normal pulses. Heart sounds: Normal heart sounds. No murmur heard. Pulmonary:      Effort: Pulmonary effort is normal.      Breath sounds: Normal breath sounds. Abdominal:      General: Bowel sounds are normal.      Palpations: Abdomen is soft. Tenderness: There is no abdominal tenderness. Musculoskeletal:         General: Normal range of motion. Right lower leg: No edema. Left lower leg: No edema. Comments: Right groin swelling and tenderness, less than yesterday   Skin:     General: Skin is warm. Neurological:      General: No focal deficit present. Mental Status: She is alert and oriented to person, place, and time.    Psychiatric:         Mood and Affect: Mood normal.         Behavior: Behavior normal.       Lab Data:  CBC:   Recent Labs     01/09/22  1415 01/09/22  1745 01/10/22  0035 01/10/22  0035 01/10/22  1912 01/11/22  0421 01/11/22  0658   WBC 5.9  --  6.6  --   --  3.8*  --    HGB 9.2*   < > 11.4*   < > 10.6* 9.5* 9.4*   HCT 28.5*   < > 37.1   < > 33.6* 30.1* 29.2*   MCV 97.9  --  97.6  --   --  97.1  --      --  136  --   --  129*  --     < > = values in this interval not displayed. BMP:   Recent Labs     01/09/22  1415 01/10/22  0035 01/11/22  0421   * 136 137   K 4.3 4.6 4.7   CL 96* 103 105   CO2 24 24 23   BUN 32* 31* 33*   CREATININE 1.0* 0.8 0.6     LIVER PROFILE:   Recent Labs     01/09/22  1415 01/10/22  0035 01/11/22 0421   AST 27 30 28   ALT 11 11 10   BILITOT <0.2 0.3 0.3   ALKPHOS 54 54 50     PT/INR:   Recent Labs     01/09/22  1415   PROTIME 14.7*   INR 1.13     APTT:   Recent Labs     01/09/22 1415   APTT >200.0*       CK, CKMB, Troponin:   No results for input(s): CKTOTAL, CKMB, TROPONINI in the last 72 hours. Last 3 BNP:  No results for input(s): BNP in the last 72 hours. IMAGING:  ECHO Complete 2D W Doppler W Color    Result Date: 1/8/2022  Transthoracic Echocardiography Report (TTE)  Demographics   Patient Name  Piero FOOTE Date of Study         01/08/2022   MRN           [id number]         Gender                Female   Date of Birth 1945     Room Number           MHL-0414   Age           64 year(s)   Height:       64 inches      Referring Physician   Diane Miller   Weight:       75 pounds      Sonographer           Brooke Gonzales RDCS   BSA:          1.29 m^2       Interpreting          Vielka Arnett MD                               Physician   BMI:          12.87 kg/m^2  Procedure Type of Study   TTE procedure:ECHO NO CONTRAST WITH DOP/COLR. BP: 144/74 mmHg Indications:Chest pain. Conclusions   Summary  There has been a mitral valve repair. Mitral valve leaflets are mildly thickened with preserved leaflet  mobility. Mild mitral regurgitation is present. Mean transmitral gradient (Doppler) 3 mmHg .   Mildly thickened aortic valve leaflets with preserved leaflet mobility. Aortic valve appears to be tricuspid. No evidence of aortic stenosis;  Mild aortic regurgitation is noted. Tricuspid valve is structurally normal.  Trivial tricuspid regurgitation. Trace pulmonic regurgitation present. Mildly dilated left atrium. Normal left ventricular size with preserved LV function and an estimated  ejection fraction of approximately 66%. No evidence of left ventricular mass or thrombus noted. Mild concentric left ventricular hypertrophy. Normal right atrial dimension with no evidence of thrombus or mass noted. Normal right ventricular size with preserved RV function. Aortic root dimension within normal limits. Dilated IVC with <50% insp collapse  No evidence of significant pericardial effusion is noted. No evidence of pleural effusion. Signature   ----------------------------------------------------------------  Electronically signed by Hank Reeves MD(Interpreting  physician) on 01/08/2022 03:38 PM  ----------------------------------------------------------------   Findings   Mitral Valve  There has been a mitral valve repair. Mitral valve leaflets are mildly thickened with preserved leaflet  mobility. Mild mitral regurgitation is present. Mean transmitral gradient (Doppler) 3 mmHg . Aortic Valve  Mildly thickened aortic valve leaflets with preserved leaflet mobility. Aortic valve appears to be tricuspid. No evidence of aortic stenosis;  Mild aortic regurgitation is noted. Tricuspid Valve  Tricuspid valve is structurally normal.  Trivial tricuspid regurgitation. Pulmonic Valve  Trace pulmonic regurgitation present. Left Atrium  Mildly dilated left atrium. Left Ventricle  Normal left ventricular size with preserved LV function and an estimated  ejection fraction of approximately 66%. No evidence of left ventricular mass or thrombus noted. Mild concentric left ventricular hypertrophy.    Right Atrium  Normal right atrial dimension with no evidence of thrombus or mass noted. Right Ventricle  Normal right ventricular size with preserved RV function. Pericardial Effusion  No evidence of significant pericardial effusion is noted. Pleural Effusion  No evidence of pleural effusion. Miscellaneous  Aortic root dimension within normal limits. Dilated IVC with <50% insp collapse  M-Mode Measurements (cm)   LVIDd: 4.02 cm                                    LVIDs: 2.82 cm  IVSd: 0.99 cm  LVPWd: 1.06 cm  Rt. Vent. Dimension: 1.99 cm                      LA: 3.3 cm  % Ejection Fraction: 57 %  Doppler Measurements:    MV Peak E-Wave: 99.8 cm/s   MV Peak A-Wave: 77.1 cm/s   MV E/A Ratio: 1.29 %   MV Peak Gradient: 3.98 mmHg      CT ABDOMEN PELVIS WO CONTRAST Additional Contrast? Radiologist Recommendation    Result Date: 1/9/2022  EXAMINATION: CT ABDOMEN PELVIS WO CONTRAST 1/9/2022 3:54 PM HISTORY: CT ABDOMEN AND PELVIS without contrast 1/9/2022 2:25 PM HISTORY: Right groin hematoma COMPARISON: None DLP: 588 mGy cm Automated exposure control was utilized to minimize patient radiation dose. TECHNIQUE: Noncontrast enhanced images of the abdomen and pelvis obtained without oral contrast. FINDINGS: Small bilateral pleural effusions are present. Rob Settler LIVER: No focal liver lesion. BILIARY SYSTEM: The gallbladder is unremarkable. No intrahepatic or extrahepatic ductal dilatation. PANCREAS: Pancreas is not well visualized. . SPLEEN: Unremarkable. KIDNEYS AND ADRENALS: Adrenal glands are unremarkable. Renal contours demonstrate excretion of contrast from recent cardiac catheterization. The ureters are decompressed and normal in appearance. RETROPERITONEUM: No mass, lymphadenopathy or hemorrhage. GI TRACT: No evidence of obstruction or bowel wall thickening. Diverticulosis is noted in the distal descending and sigmoid colon. . OTHER: Evidence of intra-abdominal hemorrhage is noted. Hemorrhage is present with blood in the cul-de-sac.  Extensive intramuscular hematoma is present in the abdominis rectus muscle. This extends from the diaphragm to the pelvis. A hematoma in the right groin is present. This is not an organized or drainable hematoma scoliosis in the thoracic and lumbar spine is noted. Postsurgical changes noted from instrumented fusion of the thoracic and lumbar spine. [place] PELVIS: Increased attenuation in the cul-de-sac consistent with evidence of hemorrhage. . The urinary bladder is normal in appearance. 1. Hemorrhage is noted. Intramuscular hemorrhage is noted in the abdominis rectus muscle. Hemorrhage in the right groin is present. Hemorrhage into the cul-de-sac is present. 2. Postsurgical change from instrumented fusion of the thoracic and lumbar spine. 3. Small bilateral pleural effusions. The hemorrhagic findings were discussed with Dr. Arnav Barnett at approximately 3:00 PM Signed by Dr Danie Miles Date: 1/8/2022  EXAMINATION: CT HEAD WO CONTRAST 1/8/2022 8:05 AM HISTORY: CT BRAIN without contrast 1/8/2022 3:01 AM HISTORY: [place] COMPARISON: None DLP: 689 mGy cm TECHNIQUE: Serial axial tomographic images of the brain were obtained without the use of intravenous contrast. FINDINGS: The midline structures are nondisplaced. There is mild cerebral and cerebellar volume loss, with an associated increase in the prominence of the ventricles and sulci. The basilar cisterns are normal in size and configuration. There is no evidence of intracranial hemorrhage or mass-effect. There is low attenuation in the periventricular white matter, consistent with chronic ischemic change. There are no abnormal extra-axial fluid collections. There is no evidence of tonsillar herniation. The included orbits and their contents are unremarkable. The visualized paranasal sinuses, mastoid air cells and middle ear cavities are clear. The visualized osseous structures and overlying soft tissues of the skull and face are intact.      Mild cerebral and cerebellar volume loss with chronic microvascular disease but no evidence of acute intracranial process. These images initially reviewed by stat rad 3:27 AM Signed by Dr Yomaira Otoole    XR CHEST PORTABLE    Result Date: 1/8/2022  EXAMINATION: XR CHEST PORTABLE 1/8/2022 8:02 AM HISTORY: XR CHEST PORTABLE 1/8/2022 2:11 AM HISTORY: Chest pain COMPARISON: None. FINDINGS: The lungs are clear. Cardiac silhouettes moderately enlarged. Postsurgical changes noted in the thoracic spine with surgical rods fixation screws. Scoliosis is present. . The osseous structures and surrounding soft tissues demonstrate no acute abnormality. 1. Moderate cardiomegaly no evidence pulmonary vascular congestion. Signed by Dr Yomaira Otoole    VL DUP LOWER EXTREMITY ARTERIES RIGHT    Result Date: 1/10/2022  Vascular Lower Extremities Arterial Duplex and Lower Extremities DVT Study Procedure  Demographics   Patient Name  Yvette Gutierrez  Age                64                A   Patient       [id number]        Gender             Female  Number   Visit Number  [id number]     Interpreting       India Harrell                              Physician   Date of Birth 1945    Referring          Jen Perdomo                              Physician   Accession     [id number]    Sonographer        Shelia Beth [address] [facility]  Number                                         RVS, RCS  Procedure Type of Study:   Extremities Arteries:Lower Extremities Arterial Duplex, VL LOWER EXTREMITY  ARTERIES DUPLEX RIGHT. Veins:Lower Extremities DVT Study, VL EXTREMITY VENOUS DUPLEX RIGHT. Indications for Study:Groin pain, right lower extremity. Risk Factors   - The patient's last creatinine was 1 mg/dl. Allergies   - Lisinopril.   - Statins.   - Other allergy:(HCTZ). - Other allergy:(Hydrochloroquine sulfate). - Other allergy:(Maxalt). - Other allergy:(cellcept). - Lisinopril.   Impression   There is a pseudoaneurysm in the right groin, with the area of active flow  measuring 1.01cm x 2.46cm long which comes off of common femoral artery. Signature   ----------------------------------------------------------------  Electronically signed by Alejandra TomasInterpreting  physician) on 01/10/2022 09:23 AM  ----------------------------------------------------------------  Velocities are measured in cm/s ; Diameters are measured in mm LE Duplex Measurements +---------------++-----+-----+----+-----------++---+-----+---+-------------+ ! ! !Right! ! Left!           !!   !     !   !             ! +---------------++-----+-----+----+-----------++---+-----+---+-------------+ ! Location       ! !PSV  ! Ratio! EDV ! Wave Desc. !!PSV! Ratio! EDV! Wave Desc.   ! +---------------++-----+-----+----+-----------++---+-----+---+-------------+ ! Common Femoral !!128  !     !    !           !!   !     !   !             ! +---------------++-----+-----+----+-----------++---+-----+---+-------------+ ! Prox PFA       !!179  !     !    !           !!   !     !   !             ! +---------------++-----+-----+----+-----------++---+-----+---+-------------+ ! Prox SFA       !!106  !     !    !           !!   !     !   !             ! +---------------++-----+-----+----+-----------++---+-----+---+-------------+ Velocities are measured in cm/s ; Diameters are measured in mm Right Lower Extremities DVT Study Measurements Right 2D Measurements +------------------------------------+----------+---------------+----------+ ! Location                            ! Visualized! Compressibility! Thrombosis! +------------------------------------+----------+---------------+----------+ ! Common Femoral                      !Yes       ! Yes            ! None      ! +------------------------------------+----------+---------------+----------+ ! Prox Femoral                        !Yes       ! Yes            ! None      ! +------------------------------------+----------+---------------+----------+    ---------------------    Heart cath Procedure Type      Diagnostic procedure:DCA, Coronary Bypass Graft Angiogram, Left Heart   Cath, Left Ventriculogram, Left Ventricular Pressure Measurement      PCI procedure:Right Coronary, RCA, MADDIE, Right Posterior Descending, MADDIE      Conclusions      Patient tolerated the procedure well. Successful PCI / Drug Eluting Stent of the proximal Right Coronary Artery. Successful PCI / Drug Eluting Stent of the proximal Posterior Descending   Coronary Artery. Recommendations      Aggressive risk factor management. Aggressive medical therapy for coronary artery disease. Signatures      ----------------------------------------------------------------   Electronically signed by Dmitriy Weber MD(Performing   Physician) on 01/09/2022 14:00   ----------------------------------------------------------------        Assessment and Plan:    NSTEMI -status post heart cath yesterday via right femoral approach with successful PCI to the RCA and PDA with 2 MADDIE    Chest pain free  Blood pressure and Hb stable. Ambulating in the room   Right side femoral pasudo-aneurysm- small per US and vascular surgery, no indication for intervention     Continue with medical therapy including aspirin, Plavix for the new stents, Lipitor metoprolol and lisinopril    S/P Mitral valve repair.    Echo reviewed    COVID-19 infection -patient stable, managed by hospitalist  Discharge planning per primary team.       Archana Francois MD, MD 1/11/2022 5:38 PM      Archana Francois MD, [address] [facility]  Interventional Cardiologist, Endovascular Specialist   Medical Director, [facility]   [facility]        (Please note that portions of this note were completed with a voice recognition program.  Tierney Freeman made to edit the dictations but occasionally words are mis-transcribed.)

## 2022-01-11 NOTE — PROGRESS NOTES
01/11/22 1228   Subjective   Subjective Patient in bed with lunch tray agrees to work with therapy and sit in chair for lunch   General Comment   Comments Patient's monitor lines unplugged stated she had been walking hersely to the BR. Vital Signs   Level of Consciousness Alert (0)   Oxygen Therapy   SpO2 94 %   O2 Device None (Room air)   Bed Mobility   Supine to Sit Modified independent   Scooting Supervision   Transfers   Sit to Stand Stand by assistance   Stand to sit Stand by assistance   Bed to Chair Stand by assistance   Ambulation   Ambulation?  Yes   Ambulation 1   Device Hand-Held Assist   Assistance Contact guard assistance   Quality of Gait Slightly unsteady holds to furniture   Gait Deviations Slow Brittani;Decreased step length   Distance 50'   Comments Patient in chair post gait with lunch tray set up   Activity Tolerance   Activity Tolerance Patient Tolerated treatment well   Safety Devices   Type of devices Left in chair;Call light within reach   Physical Therapy    Electronically signed by Alyson Schafer PTA on 1/11/2022 at 12:40 PM

## 2022-01-11 NOTE — PROGRESS NOTES
Comprehensive Nutrition Assessment    Type and Reason for Visit:  Reassess    Nutrition Recommendations/Plan: continue continue current interventions    Nutrition Assessment:  Patient's po intake remains decreased, but is taking ONS well. New wt NA. Continues to meet criteria for severe malnutrition    Malnutrition Assessment:  Malnutrition Status:  Severe malnutrition    Context:  Chronic Illness     Findings of the 6 clinical characteristics of malnutrition:  Energy Intake:  Mild decrease in energy intake (Comment)  Weight Loss:  No significant weight loss     Body Fat Loss:  7 - Severe body fat loss     Muscle Mass Loss:  7 - Severe muscle mass loss    Fluid Accumulation:  No significant fluid accumulation Extremities   Strength:  Not Performed    Estimated Daily Nutrient Needs:  Energy (kcal):  4503-0372 kcals (30-35 kcals/kg); Weight Used for Energy Requirements:  Current     Protein (g):  51g; Weight Used for Protein Requirements:  Current        Fluid (ml/day):  0243-0767 ml; Method Used for Fluid Requirements:  1 ml/kcal      Nutrition Related Findings:  very thin      Wounds:  None       Current Nutrition Therapies:    ADULT DIET;  Regular; Low Fat/Low Chol/High Fiber/REMY  ADULT ORAL NUTRITION SUPPLEMENT; Breakfast, Lunch, PM Snack, Dinner; Standard High Calorie/High Protein Oral Supplement    Anthropometric Measures:  · Height: 5' 4\" (162.6 cm)  · Current Body Weight: 75 lb (34 kg)   · Admission Body Weight: 75 lb (34 kg)    · Usual Body Weight: 78 lb (35.4 kg) (7/2021)     · Ideal Body Weight: 120 lbs; % Ideal Body Weight 62.5 %   · BMI: 12.9  · Adjusted Body Weight:  ; No Adjustment    · BMI Categories: Underweight (BMI less than 22) age over 72       Nutrition Diagnosis:   · Inadequate protein-energy intake,Severe malnutrition related to acute injury/trauma,early satiety,altered taste perception as evidenced by BMI,severe loss of subcutaneous fat,severe muscle loss      Nutrition Interventions: Food and/or Nutrient Delivery:  Start Oral Diet  Nutrition Education/Counseling:  No recommendation at this time   Coordination of Nutrition Care:  Continue to monitor while inpatient    Goals:  diet advanced with good tolerance. PO intake 50% or greater. Weight increase 1-3# per week       Nutrition Monitoring and Evaluation:   Behavioral-Environmental Outcomes:  None Identified   Food/Nutrient Intake Outcomes:  Food and Nutrient Intake,Supplement Intake  Physical Signs/Symptoms Outcomes:  Biochemical Data,Chewing or Swallowing,Fluid Status or Edema,Weight,Skin,Nutrition Focused Physical Findings     Discharge Planning:     Too soon to determine     Electronically signed by Marjorie Cisse MS, RD, LD on 1/11/22 at 10:29 AM CST    Contact: [phone]

## 2022-01-11 NOTE — PROGRESS NOTES
Daily Progress Note    Date:2022  Patient: Edna Millard  : 1945  XEL:[id number]  CODE:Full Code No additional code details  María Elena Terry MD    Admit Date: 2022  2:00 AM   LOS: 3 days       Subjective:     She feels well today. Denies any chest pain, shortness of breath, palpitation or lightheadedness. Remains hemodynamically stable. Summary:   49-year-old female with lupus on chronic prednisone, history of thyroidectomy with hypothyroidism, history of mitral valve repair, presented via EMS due to worsening chest pain with associated dizziness and fall, EKG changes noted with elevated troponin concerning for NSTEMI. Evaluated by cardiology. Also screen positive for COVID-19, however no clear evidence of pneumonia or pulmonary edema on initial chest x-ray. Day following admission taken for cardiac catheterization via right femoral artery approach due to difficulty accessing radial artery and noted to have obstructive CAD requiring stents x2. Noted to have right groin hematoma following procedure. Hours later, patient became bradycardic with heart rate down to 20s and hypotensive with systolic down to 58R with associated lightheadedness. Given atropine with improvement in HR, remained in sinus rhythm. Transferred to CCU, hypotension suspected due to hemorrhage responded to fluid resuscitation with 2 L NS, drop in hemoglobin noted and transfused PRBCs. Vasovagal event possible given bradycardia versus secondary adrenal insufficiency with stress response in setting of chronic steroids. CT abdomen/pelvis obtained showed significant extension of hematoma with intramuscular hemorrhage and abdominis rectus, right groin and and cul-de-sac. Arterial Doppler to further evaluate groin/femoral artery, which revealed small pseudoaneurysm, no need for intervention per vascular surgery. Given trial of stress dose steroids with taper back to home dose prednisone 10 mg.   Hemodynamically and symptomatically improved, H&H improved following transfusion. Initiated on dual antiplatelet therapy for stents with aspirin and Plavix.         Review of Systems  Comprehensive ROS completed and is negative except as otherwise noted        Objective:      Vital signs in last 24 hours:  Patient Vitals for the past 24 hrs:   BP Temp Temp src Pulse Resp SpO2   01/11/22 1228 -- -- -- -- -- 94 %   01/11/22 0400 (!) 97/45 96.4 °F (35.8 °C) Temporal 57 24 (!) 89 %   01/11/22 0300 (!) 95/45 -- -- 57 19 (!) 89 %   01/11/22 0200 (!) 100/44 -- -- 56 20 93 %   01/11/22 0100 (!) 98/44 -- -- 56 21 (!) 87 %   01/11/22 0000 (!) 99/42 96.6 °F (35.9 °C) Temporal 57 23 91 %   01/10/22 2300 (!) 108/42 -- -- 57 22 91 %   01/10/22 2200 (!) 108/45 -- -- 57 21 94 %   01/10/22 2000 (!) 118/45 97.2 °F (36.2 °C) Temporal 60 22 --   01/10/22 1721 -- -- -- -- 22 98 %       Physical exam    General: No acute distress  HEENT: NC/AT, no scleral icterus  Psych: Normal mood and affect  Cardiovascular: Normal rate, regular rhythm, pulses equal  Respiratory: CTA B, no wheezes no rales  Abdomen: Soft, nontender, bowel sounds present  Neurologic: Conversant, follows commands, normal speech  Extremities: Right groin swelling from recent catheterization  Skin: Warm and dry, well perfused      Lab Review   Recent Results (from the past 24 hour(s))   Hemoglobin and Hematocrit, Blood    Collection Time: 01/10/22  7:12 PM   Result Value Ref Range    Hemoglobin 10.6 (L) 12.0 - 16.0 g/dL    Hematocrit 33.6 (L) 37.0 - 47.0 %   POCT Glucose    Collection Time: 01/10/22  8:56 PM   Result Value Ref Range    POC Glucose 165 (H) 70 - 99 mg/dl    Performed on [employer]    CBC    Collection Time: 01/11/22  4:21 AM   Result Value Ref Range    WBC 3.8 (L) 4.8 - 10.8 K/uL    RBC 3.10 (L) 4.20 - 5.40 M/uL    Hemoglobin 9.5 (L) 12.0 - 16.0 g/dL    Hematocrit 30.1 (L) 37.0 - 47.0 %    MCV 97.1 81.0 - 99.0 fL    MCH 30.6 27.0 - 31.0 pg    MCHC 31.6 (L) 33.0 - 37.0 g/dL    RDW 14.9 (H) 11.5 - 14.5 %    Platelets 215 (L) 692 - 400 K/uL    MPV 11.0 9.4 - 12.3 fL   Comprehensive Metabolic Panel w/ Reflex to MG    Collection Time: 01/11/22  4:21 AM   Result Value Ref Range    Sodium 137 136 - 145 mmol/L    Potassium reflex Magnesium 4.7 3.5 - 5.0 mmol/L    Chloride 105 98 - 111 mmol/L    CO2 23 22 - 29 mmol/L    Anion Gap 9 7 - 19 mmol/L    Glucose 102 74 - 109 mg/dL    BUN 33 (H) 8 - 23 mg/dL    CREATININE 0.6 0.5 - 0.9 mg/dL    GFR Non-African American >60 >60    GFR African American >59 >59    Calcium 8.0 (L) 8.8 - 10.2 mg/dL    Total Protein 4.2 (L) 6.6 - 8.7 g/dL    Albumin 2.6 (L) 3.5 - 5.2 g/dL    Total Bilirubin 0.3 0.2 - 1.2 mg/dL    Alkaline Phosphatase 50 35 - 104 U/L    ALT 10 5 - 33 U/L    AST 28 5 - 32 U/L   Hemoglobin and Hematocrit, Blood    Collection Time: 01/11/22  6:58 AM   Result Value Ref Range    Hemoglobin 9.4 (L) 12.0 - 16.0 g/dL    Hematocrit 29.2 (L) 37.0 - 47.0 %       I/O last 3 completed shifts: In: 1184 [P.O.:240; I.V.:2000]  Out: -   I/O this shift:   In: 480 [P.O.:480]  Out: -       Current Facility-Administered Medications:     hydrocortisone sodium succinate PF (SOLU-CORTEF) injection 50 mg, 50 mg, IntraVENous, Q6H, Libertad Brown MD, 50 mg at 01/11/22 1041    glucose (GLUTOSE) 40 % oral gel 15 g, 15 g, Oral, PRN, Fabrice Nuno MD    dextrose 50 % IV solution, 12.5 g, IntraVENous, PRN, Fabrice Nuno MD, 12.5 g at 01/10/22 0538    glucagon (rDNA) injection 1 mg, 1 mg, IntraMUSCular, PRN, Fabrice Nuno MD    dextrose 5 % solution, 100 mL/hr, IntraVENous, PRN, Fabrice Nnuo MD    clopidogrel (PLAVIX) tablet 75 mg, 75 mg, Oral, Daily, Rizwana Conklin MD, 75 mg at 01/11/22 0754    [Held by provider] metoprolol succinate (TOPROL XL) extended release tablet 12.5 mg, 12.5 mg, Oral, Daily, Rizwana Conklin MD, 12.5 mg at 01/11/22 0757    HYDROmorphone HCl PF (DILAUDID) injection 0.5 mg, 0.5 mg, IntraVENous, Q4H PRN, Diane Miller MD, 0.5 mg at 01/09/22 1553    losartan (COZAAR) tablet 25 mg, 25 mg, Oral, BID, Karina Sifuentes MD, 25 mg at 01/11/22 0803    sodium chloride flush 0.9 % injection 5-40 mL, 5-40 mL, IntraVENous, 2 times per day, Karina Sifuentes MD, 10 mL at 01/11/22 0754    sodium chloride flush 0.9 % injection 5-40 mL, 5-40 mL, IntraVENous, PRN, Karina Sifuentes MD    0.9 % sodium chloride infusion, 25 mL, IntraVENous, PRN, Karina Sifuentes MD    ondansetron (ZOFRAN-ODT) disintegrating tablet 4 mg, 4 mg, Oral, Q8H PRN, 4 mg at 01/10/22 1722 **OR** ondansetron (ZOFRAN) injection 4 mg, 4 mg, IntraVENous, Q6H PRN, Karina Sifuentes MD    acetaminophen (TYLENOL) tablet 650 mg, 650 mg, Oral, Q6H PRN **OR** acetaminophen (TYLENOL) suppository 650 mg, 650 mg, Rectal, Q6H PRN, Karina Sifuentes MD    polyethylene glycol [facility]) packet 17 g, 17 g, Oral, Daily PRN, Karina Sifuentes MD    aspirin chewable tablet 81 mg, 81 mg, Oral, Daily, Karina Sifuentes MD, 81 mg at 01/11/22 4040    perflutren lipid microspheres (DEFINITY) injection 1.65 mg, 1.5 mL, IntraVENous, ONCE PRN, Karina Sifuentes MD    guaiFENesin-dextromethorphan (ROBITUSSIN DM) 100-10 MG/5ML syrup 5 mL, 5 mL, Oral, Q4H PRN, Karina Sifuentes MD    Vitamin D (CHOLECALCIFEROL) tablet 2,000 Units, 2,000 Units, Oral, Daily, Karina Sifuentes MD, 2,000 Units at 01/11/22 6019    levothyroxine (SYNTHROID) tablet 25 mcg, 25 mcg, Oral, Daily, Diane Miller MD, 25 mcg at 01/11/22 0752    fluvoxaMINE (LUVOX) tablet 50 mg, 50 mg, Oral, BID, Diane Miller MD, 50 mg at 01/11/22 9576    melatonin capsule 10 mg, 10 mg, Oral, Nightly, Diane Miller MD, 10 mg at 01/10/22 2051          Assessment/Plan  Principal Problem:    Hemorrhagic shock [facility])  Active Problems:    Chest pain due to coronary artery disease ([facility] [address]    COVID-19    Mitral valve prolapse    History of mitral valve repair    Shortness of breath    Unresponsiveness

## 2022-01-12 VITALS
DIASTOLIC BLOOD PRESSURE: 66 MMHG | BODY MASS INDEX: 12.8 KG/M2 | RESPIRATION RATE: 18 BRPM | SYSTOLIC BLOOD PRESSURE: 143 MMHG | HEIGHT: 64 IN | OXYGEN SATURATION: 95 % | HEART RATE: 87 BPM | WEIGHT: 75 LBS | TEMPERATURE: 98 F

## 2022-01-12 LAB
ALBUMIN SERPL-MCNC: 3.1 G/DL (ref 3.5–5.2)
ALP BLD-CCNC: 62 U/L (ref 35–104)
ALT SERPL-CCNC: 13 U/L (ref 5–33)
ANION GAP SERPL CALCULATED.3IONS-SCNC: 7 MMOL/L (ref 7–19)
AST SERPL-CCNC: 34 U/L (ref 5–32)
BILIRUB SERPL-MCNC: 0.5 MG/DL (ref 0.2–1.2)
BUN BLDV-MCNC: 29 MG/DL (ref 8–23)
CALCIUM SERPL-MCNC: 8.8 MG/DL (ref 8.8–10.2)
CHLORIDE BLD-SCNC: 103 MMOL/L (ref 98–111)
CO2: 27 MMOL/L (ref 22–29)
CREAT SERPL-MCNC: 0.5 MG/DL (ref 0.5–0.9)
GFR AFRICAN AMERICAN: >59
GFR NON-AFRICAN AMERICAN: >60
GLUCOSE BLD-MCNC: 112 MG/DL (ref 74–109)
HCT VFR BLD CALC: 34.8 % (ref 37–47)
HEMOGLOBIN: 10.8 G/DL (ref 12–16)
MCH RBC QN AUTO: 30.5 PG (ref 27–31)
MCHC RBC AUTO-ENTMCNC: 31 G/DL (ref 33–37)
MCV RBC AUTO: 98.3 FL (ref 81–99)
PDW BLD-RTO: 14.9 % (ref 11.5–14.5)
PLATELET # BLD: 146 K/UL (ref 130–400)
PMV BLD AUTO: 10.7 FL (ref 9.4–12.3)
POTASSIUM REFLEX MAGNESIUM: 4.8 MMOL/L (ref 3.5–5)
RBC # BLD: 3.54 M/UL (ref 4.2–5.4)
SODIUM BLD-SCNC: 137 MMOL/L (ref 136–145)
TOTAL PROTEIN: 5.1 G/DL (ref 6.6–8.7)
WBC # BLD: 4.6 K/UL (ref 4.8–10.8)

## 2022-01-12 PROCEDURE — 85027 COMPLETE CBC AUTOMATED: CPT

## 2022-01-12 PROCEDURE — 94761 N-INVAS EAR/PLS OXIMETRY MLT: CPT

## 2022-01-12 PROCEDURE — 2580000003 HC RX 258: Performed by: FAMILY MEDICINE

## 2022-01-12 PROCEDURE — 6370000000 HC RX 637 (ALT 250 FOR IP): Performed by: INTERNAL MEDICINE

## 2022-01-12 PROCEDURE — 80053 COMPREHEN METABOLIC PANEL: CPT

## 2022-01-12 PROCEDURE — 36415 COLL VENOUS BLD VENIPUNCTURE: CPT

## 2022-01-12 PROCEDURE — 6370000000 HC RX 637 (ALT 250 FOR IP): Performed by: STUDENT IN AN ORGANIZED HEALTH CARE EDUCATION/TRAINING PROGRAM

## 2022-01-12 PROCEDURE — 2700000000 HC OXYGEN THERAPY PER DAY

## 2022-01-12 PROCEDURE — 6370000000 HC RX 637 (ALT 250 FOR IP): Performed by: FAMILY MEDICINE

## 2022-01-12 PROCEDURE — 93926 LOWER EXTREMITY STUDY: CPT

## 2022-01-12 RX ORDER — CLOPIDOGREL BISULFATE 75 MG/1
75 TABLET ORAL DAILY
Qty: 90 TABLET | Refills: 3 | Status: SHIPPED | OUTPATIENT
Start: 2022-01-12 | End: 2022-05-13 | Stop reason: SINTOL

## 2022-01-12 RX ORDER — METOPROLOL SUCCINATE 25 MG/1
12.5 TABLET, EXTENDED RELEASE ORAL DAILY
Qty: 30 TABLET | Refills: 0 | Status: SHIPPED | OUTPATIENT
Start: 2022-01-12 | End: 2022-02-08 | Stop reason: SDUPTHER

## 2022-01-12 RX ORDER — PREDNISONE 10 MG/1
10 TABLET ORAL DAILY
Qty: 30 TABLET | Refills: 0 | Status: SHIPPED | OUTPATIENT
Start: 2022-01-18 | End: 2022-02-17

## 2022-01-12 RX ORDER — PREDNISONE 10 MG/1
20 TABLET ORAL DAILY
Qty: 6 TABLET | Refills: 0 | Status: SHIPPED | OUTPATIENT
Start: 2022-01-12 | End: 2022-01-15

## 2022-01-12 RX ORDER — CHOLECALCIFEROL (VITAMIN D3) 50 MCG
2000 TABLET ORAL DAILY
Qty: 90 TABLET | Refills: 0 | Status: SHIPPED | OUTPATIENT
Start: 2022-01-12

## 2022-01-12 RX ORDER — MELATONIN 10 MG
10 CAPSULE ORAL NIGHTLY
Qty: 30 CAPSULE | Refills: 0 | Status: SHIPPED | OUTPATIENT
Start: 2022-01-12 | End: 2022-02-08

## 2022-01-12 RX ORDER — ASPIRIN 81 MG/1
81 TABLET, CHEWABLE ORAL DAILY
Qty: 90 TABLET | Refills: 3 | Status: SHIPPED | OUTPATIENT
Start: 2022-01-12

## 2022-01-12 RX ADMIN — PREDNISONE 20 MG: 20 TABLET ORAL at 09:11

## 2022-01-12 RX ADMIN — Medication 2000 UNITS: at 09:11

## 2022-01-12 RX ADMIN — LEVOTHYROXINE SODIUM 25 MCG: 25 TABLET ORAL at 09:15

## 2022-01-12 RX ADMIN — CLOPIDOGREL BISULFATE 75 MG: 75 TABLET ORAL at 09:11

## 2022-01-12 RX ADMIN — METOPROLOL SUCCINATE 12.5 MG: 25 TABLET, EXTENDED RELEASE ORAL at 09:10

## 2022-01-12 RX ADMIN — FLUVOXAMINE MALEATE 50 MG: 50 TABLET, COATED ORAL at 09:11

## 2022-01-12 RX ADMIN — SODIUM CHLORIDE, PRESERVATIVE FREE 10 ML: 5 INJECTION INTRAVENOUS at 09:11

## 2022-01-12 RX ADMIN — ASPIRIN 81 MG: 81 TABLET, CHEWABLE ORAL at 09:11

## 2022-01-12 NOTE — PROGRESS NOTES
11:30a - Home oxygen evaluation performed and results are as follows: SaO2 = 90% on R/A at rest, SaO2 = 93% on 2 lpm O2(NC) at rest, SaO2 = 88% on R/A while ambulating, SaO2 = 91% on 2 lpm O2(NC) while ambulating. (Recovery SaO2) TS RRT/RCP

## 2022-01-12 NOTE — CARE COORDINATION
Initial CM Assessment    Initial Assessment Completed at bedside with:    [x]   Patient  []   Family/Caregiver/Guardian   []   Other:      Patient Contact Information:  [address] [place] [phone] (home)    Above information verified? [x]   Yes  []   No    ADLS:    Independent - caregiver of    Support System:    family members   Plan to return to current housing:   [x]   Yes  []   No    Transportation plan for Discharge:  [place] you have any unmet social needs that would keep you from returning home safely:  []   Yes  [x]   No              Unmet Social Needs Notes:       Had [facility] prior to admission:    []   Yes  [x]   No    Currently ACTIVE with Home Health CARE:    []   Yes  []   No  [x]   Interested at discharge  [facility]:      Current PCP:  Jered Arshad MD  PCP verified? [x]   Yes  []   No    Have you been vaccinated for COVID-19 (SARS-CoV-2)? []   Yes  [x]   No                   If so, when? Which :  []   Halalati  []   Moderna  []   Danie Huddleston  []   Other:       Pharmacy:    [facility], [address] [facility] [phone] Roxanne Willard [phone]  [address] [facility] [address]  Phone: [phone] Fax: [phone]    Prefer to use Meds to Bed? [x]   Yes  []   No  Potential assistance purchasing medications?      []   Yes  [x]   No    Active with HD/PD prior to admission:           []   Yes  [x]   No  HD Center:       Financial:  Payor: [address] [place] [address] / Plan: MEDICARE PART A AND B / Product Type: *No Product type* /     Pre-Cert required for SNF:   []   Yes  [x]   No    Patient Deficits:  []   Yes      No    If yes:  []   Confusion/Memory  []   Visual  []   Motor/Sensory         []   Right arm         []   Right leg         []   Left arm         []   Left leg  []   Language/Speech         []   Aphasia         []   Dysarthria         []   Swallow         [place] Coma Scale  Eye Opening: Spontaneous  Best Verbal Response: Oriented  Best Motor Response: Obeys commands  [place] Coma Scale Score: 15    Patient Deficit Notes: Additional CM/SW Notes:   Spoke with pt to assess dc plans/needs. She lives at home with her , who has Parkinsons. She is his caregiver. She is usually independent at home. She does not have a cane or walker for support, she is interested in one being ordered for her prior to dc. Requested order from MD. She does not have a preference of provider. She states that she does not wear o2 at home and is not requiring any this morning. Home health has been ordered for her. She denies any other needs at this time. Eliquis vic has been provided for pt in chart if needed at dc.       Jorden Nabil and/or her family were provided with choice of provider:  [x]   Yes   []   No      Patient Admission Status:   Inpatient [101]    [facility]

## 2022-01-12 NOTE — PLAN OF CARE
Problem: Nutrition  Goal: Optimal nutrition therapy  1/11/2022 2321 by Jeyson Shin RN  Outcome: Ongoing  1/11/2022 1031 by Manju Walters, MS, RD, LD  Outcome: Ongoing     Problem: Airway Clearance - Ineffective  Goal: Achieve or maintain patent airway  Outcome: Ongoing     Problem: Gas Exchange - Impaired  Goal: Absence of hypoxia  Outcome: Ongoing  Goal: Promote optimal lung function  Outcome: Ongoing     Problem: Breathing Pattern - Ineffective  Goal: Ability to achieve and maintain a regular respiratory rate  Outcome: Ongoing     Problem: Isolation Precautions - Risk of Spread of Infection  Goal: Prevent transmission of infection  Outcome: Ongoing

## 2022-01-12 NOTE — CARE COORDINATION
Walker and home oxygen order sent to [facility].    [facility]   P  [phone] [id number]  Electronically signed by Rylee Shook on 1/12/2022 at 12:59 PM

## 2022-01-12 NOTE — DISCHARGE SUMMARY
Discharge Summary    NAME: Jonathan Loza  :  1945  MRN:  [id number]    Admit date:  2022  Discharge date:  2022    Admitting Physician:  Gabriel Tse MD    Advance Directive: Full Code    Consults: cardiology  Vascular Surgery    Primary Care Physician:  Fanny Radford MD    Discharge Diagnoses:  Principal Problem:    Hemorrhagic shock [facility])  Active Problems:      NSTEMI (non-ST elevation myocardial infarction) [facility])    Chest pain due to coronary artery disease ([address]    COVID-19     Symptomatic bradycardia    Mitral valve prolapse    History of mitral valve repair    Depression    Severe malnutrition (HCC)    Weight loss, non-intentional    Lupus (systemic lupus erythematosus) (HCC)    Chest pain    Palliative care patient    Current chronic use of systemic steroids      Significant Diagnostic Studies:   ECHO Complete 2D W Doppler W Color    Result Date: 2022  Transthoracic Echocardiography Report (TTE)  Demographics   Patient Name  Debbie FOOTE Date of Study         2022   MRN           [id number]         Gender                Female   Date of Birth 1945     Room Number           MHL-0414   Age           64 year(s)   Height:       64 inches      Referring Physician   Giovanny Fischer   Weight:       75 pounds      Sonographer           Brooke Gonzales RDCS   BSA:          1.29 m^2       Interpreting          Jose Rivera MD                               Physician   BMI:          12.87 kg/m^2  Procedure Type of Study   TTE procedure:ECHO NO CONTRAST WITH DOP/COLR. BP: 144/74 mmHg Indications:Chest pain. Conclusions   Summary  There has been a mitral valve repair. Mitral valve leaflets are mildly thickened with preserved leaflet  mobility. Mild mitral regurgitation is present. Mean transmitral gradient (Doppler) 3 mmHg . Mildly thickened aortic valve leaflets with preserved leaflet mobility. Aortic valve appears to be tricuspid.   No evidence of aortic stenosis;  Mild aortic regurgitation is noted. Tricuspid valve is structurally normal.  Trivial tricuspid regurgitation. Trace pulmonic regurgitation present. Mildly dilated left atrium. Normal left ventricular size with preserved LV function and an estimated  ejection fraction of approximately 66%. No evidence of left ventricular mass or thrombus noted. Mild concentric left ventricular hypertrophy. Normal right atrial dimension with no evidence of thrombus or mass noted. Normal right ventricular size with preserved RV function. Aortic root dimension within normal limits. Dilated IVC with <50% insp collapse  No evidence of significant pericardial effusion is noted. No evidence of pleural effusion. Signature   ----------------------------------------------------------------  Electronically signed by Dmitriy Weber MD(Interpreting  physician) on 01/08/2022 03:38 PM  ----------------------------------------------------------------   Findings   Mitral Valve  There has been a mitral valve repair. Mitral valve leaflets are mildly thickened with preserved leaflet  mobility. Mild mitral regurgitation is present. Mean transmitral gradient (Doppler) 3 mmHg . Aortic Valve  Mildly thickened aortic valve leaflets with preserved leaflet mobility. Aortic valve appears to be tricuspid. No evidence of aortic stenosis;  Mild aortic regurgitation is noted. Tricuspid Valve  Tricuspid valve is structurally normal.  Trivial tricuspid regurgitation. Pulmonic Valve  Trace pulmonic regurgitation present. Left Atrium  Mildly dilated left atrium. Left Ventricle  Normal left ventricular size with preserved LV function and an estimated  ejection fraction of approximately 66%. No evidence of left ventricular mass or thrombus noted. Mild concentric left ventricular hypertrophy. Right Atrium  Normal right atrial dimension with no evidence of thrombus or mass noted.    Right Ventricle  Normal right ventricular size with preserved RV function. Pericardial Effusion  No evidence of significant pericardial effusion is noted. Pleural Effusion  No evidence of pleural effusion. Miscellaneous  Aortic root dimension within normal limits. Dilated IVC with <50% insp collapse  M-Mode Measurements (cm)   LVIDd: 4.02 cm                                    LVIDs: 2.82 cm  IVSd: 0.99 cm  LVPWd: 1.06 cm  Rt. Vent. Dimension: 1.99 cm                      LA: 3.3 cm  % Ejection Fraction: 57 %  Doppler Measurements:    MV Peak E-Wave: 99.8 cm/s   MV Peak A-Wave: 77.1 cm/s   MV E/A Ratio: 1.29 %   MV Peak Gradient: 3.98 mmHg      CT ABDOMEN PELVIS WO CONTRAST Additional Contrast? Radiologist Recommendation    Result Date: 1/9/2022  EXAMINATION: CT ABDOMEN PELVIS WO CONTRAST 1/9/2022 3:54 PM HISTORY: CT ABDOMEN AND PELVIS without contrast 1/9/2022 2:25 PM HISTORY: Right groin hematoma COMPARISON: None DLP: 588 mGy cm Automated exposure control was utilized to minimize patient radiation dose. TECHNIQUE: Noncontrast enhanced images of the abdomen and pelvis obtained without oral contrast. FINDINGS: Small bilateral pleural effusions are present. [place] LIVER: No focal liver lesion. BILIARY SYSTEM: The gallbladder is unremarkable. No intrahepatic or extrahepatic ductal dilatation. PANCREAS: Pancreas is not well visualized. . SPLEEN: Unremarkable. KIDNEYS AND ADRENALS: Adrenal glands are unremarkable. Renal contours demonstrate excretion of contrast from recent cardiac catheterization. The ureters are decompressed and normal in appearance. RETROPERITONEUM: No mass, lymphadenopathy or hemorrhage. GI TRACT: No evidence of obstruction or bowel wall thickening. Diverticulosis is noted in the distal descending and sigmoid colon. . OTHER: Evidence of intra-abdominal hemorrhage is noted. Hemorrhage is present with blood in the cul-de-sac. Extensive intramuscular hematoma is present in the abdominis rectus muscle. This extends from the diaphragm to the pelvis.  A hematoma in the right groin is present. This is not an organized or drainable hematoma scoliosis in the thoracic and lumbar spine is noted. Postsurgical changes noted from instrumented fusion of the thoracic and lumbar spine. Roslynn Mutton PELVIS: Increased attenuation in the cul-de-sac consistent with evidence of hemorrhage. . The urinary bladder is normal in appearance. 1. Hemorrhage is noted. Intramuscular hemorrhage is noted in the abdominis rectus muscle. Hemorrhage in the right groin is present. Hemorrhage into the cul-de-sac is present. 2. Postsurgical change from instrumented fusion of the thoracic and lumbar spine. 3. Small bilateral pleural effusions. The hemorrhagic findings were discussed with Dr. Carlton Wright at approximately 3:00 PM Signed by Dr Cristina Muniz Date: 1/8/2022  EXAMINATION: CT HEAD WO CONTRAST 1/8/2022 8:05 AM HISTORY: CT BRAIN without contrast 1/8/2022 3:01 AM HISTORY: D[place]zy COMPARISON: None DLP: 689 mGy cm TECHNIQUE: Serial axial tomographic images of the brain were obtained without the use of intravenous contrast. FINDINGS: The midline structures are nondisplaced. There is mild cerebral and cerebellar volume loss, with an associated increase in the prominence of the ventricles and sulci. The basilar cisterns are normal in size and configuration. There is no evidence of intracranial hemorrhage or mass-effect. There is low attenuation in the periventricular white matter, consistent with chronic ischemic change. There are no abnormal extra-axial fluid collections. There is no evidence of tonsillar herniation. The included orbits and their contents are unremarkable. The visualized paranasal sinuses, mastoid air cells and middle ear cavities are clear. The visualized osseous structures and overlying soft tissues of the skull and face are intact. Mild cerebral and cerebellar volume loss with chronic microvascular disease but no evidence of acute intracranial process. These images initially reviewed by stat rad 3:27 AM Signed by Dr Rupa Devries    XR CHEST PORTABLE    Result Date: 1/8/2022  EXAMINATION: XR CHEST PORTABLE 1/8/2022 8:02 AM HISTORY: XR CHEST PORTABLE 1/8/2022 2:11 AM HISTORY: Chest pain COMPARISON: None. FINDINGS: The lungs are clear. Cardiac silhouettes moderately enlarged. Postsurgical changes noted in the thoracic spine with surgical rods fixation screws. Scoliosis is present. . The osseous structures and surrounding soft tissues demonstrate no acute abnormality. 1. Moderate cardiomegaly no evidence pulmonary vascular congestion. Signed by Dr Rupa Devries      Pertinent Labs:   CBC:   Recent Labs     01/09/22  1415 01/09/22  1745 01/10/22  0035 01/10/22  0035 01/10/22  1912 01/11/22  0421 01/11/22  0658   WBC 5.9  --  6.6  --   --  3.8*  --    HGB 9.2*   < > 11.4*   < > 10.6* 9.5* 9.4*     --  136  --   --  129*  --     < > = values in this interval not displayed. BMP:    Recent Labs     01/09/22  1415 01/10/22  0035 01/11/22  0421   * 136 137   K 4.3 4.6 4.7   CL 96* 103 105   CO2 24 24 23   BUN 32* 31* 33*   CREATININE 1.0* 0.8 0.6   GLUCOSE 98 66* 102     INR:   Recent Labs     01/09/22  1415   INR 1.13             Hospital Course:    77-year-old female with lupus on chronic prednisone, history of thyroidectomy with hypothyroidism, history of mitral valve repair, presented via EMS due to worsening chest pain with associated dizziness and fall, EKG changes noted with elevated troponin concerning for NSTEMI. Evaluated by cardiology. Also screen positive for COVID-19, however no clear evidence of pneumonia or pulmonary edema on initial chest x-ray. Day following admission taken for cardiac catheterization via right femoral artery approach due to difficulty accessing radial artery and noted to have obstructive CAD requiring stents x2. Noted to have right groin hematoma following procedure.   Hours later, patient became bradycardic with heart rate down to 20s and hypotensive with systolic down to 78B with associated lightheadedness. Given atropine with improvement in HR, remained in sinus rhythm. Transferred to CCU, hypotension suspected due to hemorrhage responded to fluid resuscitation with 2 L NS, drop in hemoglobin noted and transfused PRBCs. Vasovagal event possible given bradycardia versus secondary adrenal insufficiency with stress response in setting of chronic steroids. CT abdomen/pelvis obtained showed significant extension of hematoma with intramuscular hemorrhage and abdominis rectus, right groin and and cul-de-sac. Arterial Doppler to further evaluate groin/femoral artery, which revealed small pseudoaneurysm, no need for intervention per vascular surgery. Given trial of stress dose steroids with taper back to home dose prednisone 10 mg. Hemodynamically and symptomatically improved, H&H improved following transfusion. Initiated on dual antiplatelet therapy for stents with aspirin and Plavix. Underwent home O2 assessment and did qualify for 2 L nasal cannula for use while ambulating. Patient stable for discharge home with home health arranged, home O2, prednisone with taper back to home dose 10 mg, as well as aspirin and Plavix to take uninterrupted with outpatient follow-up with PCP, cardiology, and vascular to repeat arterial scan.         Physical Exam:  Vital Signs: BP (!) 158/79   Pulse 96   Temp 97.8 °F (36.6 °C)   Resp 16   Ht 5' 4\" (1.626 m)   Wt 75 lb (34 kg)   SpO2 96%   BMI 12.87 kg/m²   General: No acute distress  HEENT: NC/AT, no scleral icterus  Psych: Normal mood and affect  Cardiovascular: Normal rate, regular rhythm  Respiratory: Normal respiratory effort, no intercostal retractions  Abdomen: Nondistended  Neurologic: Conversant, follows commands, normal speech  Extremities: No clubbing or cyanosis  Skin: No lesions or rashes    Discharge Medications:         Medication List      START taking these medications    aspirin 81 MG chewable tablet  Take 1 tablet by mouth daily     clopidogrel 75 MG tablet  Commonly known as: PLAVIX  Take 1 tablet by mouth daily     melatonin 10 MG Caps capsule  Take 1 capsule by mouth nightly     metoprolol succinate 25 MG extended release tablet  Commonly known as: TOPROL XL  Take 0.5 tablets by mouth daily     vitamin D 50 MCG (2000 UT) Tabs tablet  Commonly known as: CHOLECALCIFEROL  Take 1 tablet by mouth daily        CHANGE how you take these medications    * predniSONE 10 MG tablet  Commonly known as: DELTASONE  Take 2 tablets by mouth daily for 3 doses  What changed:   · how much to take  · how to take this  · when to take this  · additional instructions     * predniSONE 10 MG tablet  Commonly known as: DELTASONE  Take 1 tablet by mouth daily  Start taking on: January 18, 2022  What changed: You were already taking a medication with the same name, and this prescription was added. Make sure you understand how and when to take each. * This list has 2 medication(s) that are the same as other medications prescribed for you. Read the directions carefully, and ask your doctor or other care provider to review them with you. CONTINUE taking these medications    Synthroid 25 MCG tablet  Generic drug: levothyroxine        STOP taking these medications    losartan 50 MG tablet  Commonly known as: COZAAR           Where to Get Your Medications      These medications were sent to [address] [place] [address], [address] [facility] [phone] - F [phone]  [address] [place] [address]    Phone: [phone]   · aspirin 81 MG chewable tablet  · clopidogrel 75 MG tablet  · melatonin 10 MG Caps capsule  · metoprolol succinate 25 MG extended release tablet  · predniSONE 10 MG tablet  · predniSONE 10 MG tablet  · vitamin D 50 MCG (2000 UT) Tabs tablet         Discharge Instructions: Follow up with Barndy Aguirre MD within 1 week. Take medications as directed. Resume activity as tolerated. Disposition: Patient is medically stable and will be discharged home with home health. Time spent on discharge 35 minutes.     Signed:  Dusty Miramontes MD  1/12/2022 8:09 AM

## 2022-01-12 NOTE — PROGRESS NOTES
Vascular Preliminary Report    Right Groin scan completed. No active flow is noted within the previously seen pseudoaneurysm. Hematoma now measures approximately 0.9cm by 2.1cm. Right common femoral vein and proximal superficial femoral vein appear patent and compressible. Final Report Pending.

## 2022-01-12 NOTE — CARE COORDINATION
HH referral received. Spoke with patient, and she selected [facility].   Referral faxed and PENDING  Electronically signed by María Elena Sandhu on 1/12/22 at 9:51 AM CST

## 2022-01-13 ENCOUNTER — CARE COORDINATION (OUTPATIENT)
Dept: CASE MANAGEMENT | Age: 77
End: 2022-01-13

## 2022-01-13 DIAGNOSIS — I73.9 PVD (PERIPHERAL VASCULAR DISEASE) (HCC): ICD-10-CM

## 2022-01-13 DIAGNOSIS — I72.9 PSEUDOANEURYSM (HCC): Primary | ICD-10-CM

## 2022-01-13 NOTE — CARE COORDINATION
[facility] Transitions Initial Follow Up Call    Call within 2 business days of discharge: Yes    Patient: Jonathan Loza Patient : 1945   MRN: [id number]  Reason for Admission: Chest pain, et al  Discharge Date: 22 RARS: Readmission Risk Score: 16.6 ( )      Last Discharge [facility]       Complaint Diagnosis Description Type Department Provider    22 Chest Pain Chest pain, unspecified type . .. ED to Hosp-Admission (Discharged) (ADMITTED) L ONC Giovanny Fischer MD; Adia Hernández. .. Spoke with: Taty 5: [address] [employer]      Non-face-to-face services provided:  Reviewed encounter information for continuity of care prior to follow up Care Transitions phone call - chart notes, consults, progress notes, test results, med list, appointments, AVS, other information. Care Transitions 24 Hour Call    Schedule Follow Up Appointment with PCP: Declined  Do you have a copy of your discharge instructions?: Yes  Do you have all of your prescriptions and are they filled?: No  Have you been contacted by a [address]?: No  Have you scheduled your follow up appointment?: No  Were you discharged with any Home Care or Post Acute Services: Yes  Post Acute Services: [facility] [place] you feel like you have everything you need to keep you well at home?: Yes  Care Transitions Interventions       Placed a call to the number listed for patient for an initial follow up call for Care Transitions Coordination following discharge from the hospital. Spoke with patient regarding hospitalization, discharge and status thus far. She said she had a good night last night. She did say that as she was trying to get up and get going this morning, with personal care, etc., that she had some dizziness. She didn't feel faint or anything like that, but just dizzy. I asked if she had had much to drink. She said no, not really.   I did ask if she was on any kind of fluid restriction and she said no. I encouraged her to try to increase her fluid intake to about 60 ounces a day if she can and explained why she needed those fluids. She voiced understanding. She did say that she has a blood pressure monitor and is able to check it. I encouraged her to check it about twice daily for a few days to be sure it is staying within normal limits. She said she would. Informed her that it needs to be at least 011 systolic and about 60 diastolic. Did discuss orthostatic hypotension, causes, fall risk, etc.  Informed of need to stand for up to 2 mins for everything to balance out prior to walking away from where she was sitting. Voiced understanding. She said she is not having any shortness of breath, but does have a little bit of a cough. She is using her oxygen at all times for now. Discussed basic oxygen information, usage, parameters for pulse ox readings, weaning when appropriate, fire precautions, falling precautions, infection control measures, skin/nare protection, etc.  Did let patient know that it is ok to use oxygen while in the shower as long as the electrical cord and concentrator are not close by. Did encourage using oxygen when exerting energy, such as when bathing, dressing, ambulating, eating, etc.  Patient voiced understanding. She said her appetite is not what it normally is, but she feels like it may be coming back some. She has not been able to start her meds. The pharmacy will deliver today she said. She has plenty of meds taken prior to admission. She has not made hospital follow up appointments as yet. Said her daughter would assist with that as she will be the one taking her. She has not heard from home health as yet. Told her that she should hear something today, but if not, please give me a call. I will check with [facility] to be sure they have the referral.  She said she is using her walker. She is weak still though. No other issues reported/noted at this time. Did discuss quarantine. She said she is 11 days out from first symptoms. Informed to use good handwashing and other infection control measures. Informed/ reminded of CTC process, purpose, future calls, etc and is agreeable with appropriate follow up. Ensured to have CTN contact information to be used as needed. Encouraged to call as needed for new issues, questions, etc.  Given the opportunity to ask questions and answered appropriately. CTN to follow up as indicated. Transitions of Care Initial Call    Was this an external facility discharge? No    Challenges to be reviewed by the provider   Additional needs identified to be addressed with provider: No         Method of communication with provider : none    Advance Care Planning:   Does patient have an Advance Directive: reviewed and current. Advance Care Planning   Healthcare Decision Maker:    Primary Decision Maker: Hunter Serrano Child - [phone]    Was this a readmission? No  Patients top risk factors for readmission: functional physical ability  medical condition-chest pain, COVID 19, et al  medication management    Care Transition Nurse (CTN) contacted the patient by telephone to perform post hospital discharge assessment. Verified name and  with patient as identifiers. Provided introduction to self, and explanation of the CTN role. CTN reviewed discharge instructions, medical action plan and red flags with patient who verbalized understanding. Patient given an opportunity to ask questions and does not have any further questions or concerns at this time. Were discharge instructions available to patient? Yes. Reviewed appropriate site of care based on symptoms and resources available to patient including: PCP, Specialist, Urgent care clinics, [facility], Home health, When to call 911 and [employer]. The patient agrees to contact the PCP office for questions related to their healthcare.      Medication reconciliation was performed with patient, who verbalizes understanding of administration of home medications. Advised obtaining a 90-day supply of all daily and as-needed medications. Covid Risk Education     Educated patient about risk for severe COVID-19 due to risk factors according to CDC guidelines. CTN reviewed discharge instructions, medical action plan and red flag symptoms with the patient who verbalized understanding. Discussed COVID vaccination status: Yes. Education provided on COVID-19 vaccination as appropriate. Discussed exposure protocols and quarantine with CDC Guidelines. Patient was given an opportunity to verbalize any questions and concerns and agrees to contact CTN or health care provider for questions related to their healthcare. Reviewed and educated patient on any new and changed medications related to discharge diagnosis. Was patient discharged with a pulse oximeter? No     CTN provided contact information. Plan for follow-up call in 5-7 days based on severity of symptoms and risk factors.   Plan for next call: - Plan for next call - assess overall status, multiple body systems status, pain, appetite, sleep patterns, abnormal signs and symptoms, availability and effectiveness of medications, activity level and tolerance, falls/other unexpected events, findings from follow up appointments, medication/treatment changes, any additional teaching needs, etc.  Education regarding self care mgmt - disease process mgmt, symptom mgmt, diet/hydration, pain control needs, medication mgmt, activity level, home safety needs, infection control, fall precautions, seeking medical attention, who/when to call prn any needs, etc.    Follow Up  Future Appointments   Date Time Provider Madhu Shepard   5/17/2022 11:00 AM Jose Moon MD [facility] [place]       Ines Escobar RN

## 2022-01-13 NOTE — CARE COORDINATION
[facility] [address] [facility], and spoke with a couple of people there. Was told that they have sent the referral to [facility] in [place]., as they are not able to see patient at this time. She said she would check with them and be sure they are planning to see the patient and will let patient know what is going on at this time. Will follow to ensure patient is seen timely. no weight-bearing restrictions

## 2022-01-14 ENCOUNTER — CARE COORDINATION (OUTPATIENT)
Dept: CASE MANAGEMENT | Age: 77
End: 2022-01-14

## 2022-01-14 NOTE — CARE COORDINATION
[facility] Transitions Follow Up Call    2022    Patient: Ioana Mensah  Patient : 1945   MRN: [id number]  Reason for Admission: Chest pain, et al  Discharge Date: 22 RARS: Readmission Risk Score: 16.6 ( )         Spoke with: Daughter    Care Transitions Subsequent and Final Call    Subsequent and Final Calls  Are you currently active with any services?: Home Health  Care Transitions Interventions  Other Interventions:         Spoke with daughter for follow up to be sure patient had been contacted by home health. Daughter did say that the nurse from [facility] was out yesterday. She also said that the nurse accidentally took the AVS with her when she left. She needs it to check on appointments and medications. I will email it to her. She gave me her email address. I did review appointments with her. She said the earliest the PCP could get her in for a follow up is . She is to see vascular . She needs to make the cardiology appointment yet. She said patient is still having a little bit of dizziness, but not as bad as yesterday morning. She did say that she drank more fluids yesterday than usual.  She is eating a little better, but she has picked up some Ensure for her to use as well. No other issues or needs noted at this time. Will follow up again next week to ensure patient is progressing. TO call prn any needs.       Follow Up  Future Appointments   Date Time Provider Madhu Shepard   2022  1:00 PM [facility] VASC LAB ROOM 1 [facility]   2022  1:30 PM Samanta Shelton PA-C N [facility] Spec [place]   2022 11:00 AM Jaclyn Hager MD N [facility] Cardio [place]       Tyron Rodriguez RN

## 2022-01-17 ENCOUNTER — CLINICAL DOCUMENTATION (OUTPATIENT)
Dept: CARDIOLOGY | Age: 77
End: 2022-01-17

## 2022-01-17 NOTE — CARE COORDINATION
Patient was discharged prior to PTCA/STENT TEACHING being conducted. Cardiac Rehab education packet was sent to patient's address on record.  Handouts included were titled; \"Home Instructions Following a Cardiac Event\", \"Cardiac Home Exercise Program-Phase I\", Risk Factors for Heart Disease and Stroke\", and Cardiac Diet/Low Cholesterol \"

## 2022-01-21 ENCOUNTER — CARE COORDINATION (OUTPATIENT)
Dept: CASE MANAGEMENT | Age: 77
End: 2022-01-21

## 2022-01-21 NOTE — CARE COORDINATION
[facility] Transitions Follow Up Call    2022    Patient: Lacey Russell  Patient : 1945   MRN: <[id number]>  Reason for Admission: [address]  Discharge Date: 22 RARS: Readmission Risk Score: 16.6 ( )         Spoke with: pt's dtr    Care Transitions Subsequent and Final Call    Subsequent and Final Calls  Do you have any ongoing symptoms?: No  Have your medications changed?: No  Do you have any questions related to your medications?: No  Do you currently have any active services?: Yes  Are you currently active with any services?: Home Health  Do you have any needs or concerns that I can assist you with?: No  Care Transitions Interventions  Other Interventions:         States she's doing better. Reports she was a little dizzy this morning when she got up too fast, now resolved. States she's been moving more now and not coughing as much. Reports she was able to sleep in her bed last night. States she ate a good breakfast and is in the bathroom now. Denies SOB, CP, fever or other new or worsening symptoms. States the home care nurse is coming today and is bringing cream for a sore she has on her coccyx. Pt's dtr places medications in a mediset for pt and is assisting her with them. Reports they have everything they need at this time. Denies questions or concerns.    Follow U  Future Appointments   Date Time Provider Madhu Shepard   2022  1:00 PM [facility] VASC LAB ROOM 1 [facility] VASC LAB [facility]   2022  1:30 PM STONEY Tafoya [facility]asc Spec [facility]   2022  1:15 PM [facility] [place] [facility]   2022 11:00 AM King Gan MD N [facility] Cardio [facility]       Amadeo Molina

## 2022-01-22 ENCOUNTER — HOSPITAL ENCOUNTER (EMERGENCY)
Facility: HOSPITAL | Age: 77
End: 2022-01-22

## 2022-01-26 ENCOUNTER — HOSPITAL ENCOUNTER (OUTPATIENT)
Dept: VASCULAR LAB | Age: 77
Discharge: HOME | End: 2022-01-26
Payer: MEDICARE

## 2022-01-26 ENCOUNTER — OFFICE VISIT (OUTPATIENT)
Dept: VASCULAR SURGERY | Age: 77
End: 2022-01-26
Payer: MEDICARE

## 2022-01-26 VITALS
OXYGEN SATURATION: 98 % | SYSTOLIC BLOOD PRESSURE: 152 MMHG | HEART RATE: 76 BPM | TEMPERATURE: 97.9 F | HEIGHT: 64 IN | DIASTOLIC BLOOD PRESSURE: 84 MMHG | BODY MASS INDEX: 12.87 KG/M2

## 2022-01-26 DIAGNOSIS — I72.9 PSEUDOANEURYSM (HCC): Primary | ICD-10-CM

## 2022-01-26 DIAGNOSIS — I73.9 PVD (PERIPHERAL VASCULAR DISEASE) (HCC): ICD-10-CM

## 2022-01-26 DIAGNOSIS — I72.9 PSEUDOANEURYSM (HCC): ICD-10-CM

## 2022-01-26 PROCEDURE — G8427 DOCREV CUR MEDS BY ELIG CLIN: HCPCS | Performed by: PHYSICIAN ASSISTANT

## 2022-01-26 PROCEDURE — 1111F DSCHRG MED/CURRENT MED MERGE: CPT | Performed by: PHYSICIAN ASSISTANT

## 2022-01-26 PROCEDURE — 1123F ACP DISCUSS/DSCN MKR DOCD: CPT | Performed by: PHYSICIAN ASSISTANT

## 2022-01-26 PROCEDURE — 93926 LOWER EXTREMITY STUDY: CPT

## 2022-01-26 PROCEDURE — 1090F PRES/ABSN URINE INCON ASSESS: CPT | Performed by: PHYSICIAN ASSISTANT

## 2022-01-26 PROCEDURE — 4040F PNEUMOC VAC/ADMIN/RCVD: CPT | Performed by: PHYSICIAN ASSISTANT

## 2022-01-26 PROCEDURE — 1036F TOBACCO NON-USER: CPT | Performed by: PHYSICIAN ASSISTANT

## 2022-01-26 PROCEDURE — G8484 FLU IMMUNIZE NO ADMIN: HCPCS | Performed by: PHYSICIAN ASSISTANT

## 2022-01-26 PROCEDURE — 99202 OFFICE O/P NEW SF 15 MIN: CPT | Performed by: PHYSICIAN ASSISTANT

## 2022-01-26 PROCEDURE — G8400 PT W/DXA NO RESULTS DOC: HCPCS | Performed by: PHYSICIAN ASSISTANT

## 2022-01-26 PROCEDURE — G8419 CALC BMI OUT NRM PARAM NOF/U: HCPCS | Performed by: PHYSICIAN ASSISTANT

## 2022-01-26 NOTE — PROGRESS NOTES
Patient Care Team:  Luiz Rogers MD as PCP - General (Family Medicine)  JEREMY Victor as Nurse Practitioner (Nurse Practitioner Family)  JEREMY Scott as Advanced Practice Nurse (Family Nurse Practitioner)  Ck Bonds MD as Consulting Physician (Cardiology)  Lizabeth Chacon RN as Care Transitions Nurse      History and Physical  Mrs. Brian Garcia is a 58-year-old female who comes in today for hospital follow-up pseudoaneurysm status post cardiac cath right common femoral artery. She reports that the bruising and swelling has improved she denies any significant pain in her right groin.   She denies any pain in her right leg    Aneudy Miguel is a 64 y.o. female with the following [facility]:  Patient Active Problem List    Diagnosis Date Noted    Symptomatic bradycardia 01/11/2022     Priority: High    Hemorrhagic shock ([address] 01/09/2022     Priority: High    Current chronic use of systemic steroids 01/11/2022    Palliative care patient 01/10/2022    Unresponsiveness 01/08/2022    Depression 01/08/2022    Severe malnutrition ([address] 01/08/2022    Weight loss, non-intentional 01/08/2022    Lupus (systemic lupus erythematosus) ([address] 01/08/2022    Chest pain 01/08/2022    COVID-19 01/08/2022    NSTEMI (non-ST elevation myocardial infarction) ([address] 01/08/2022    Traumatic rupture of volar plate of finger 98/54/5013    Shortness of breath 10/22/2014    Fatigue 10/22/2014    Chest pain due to coronary artery disease ([address] 05/31/2012 5/31/12  cardiolite negative for myocardial ischemia, EF 75%  10/30/2014  lexiscan  negative for myocardial ischemia, EF 77  %  10/30/2014  Echo  Normal LVEF, RVSP 50 mmHg      replace inactive diagnosis      History of mitral valve repair      2006  MV repair with Dr. Tineo Left Mitral valve prolapse      5/31/2012  Echo  Normal LVFX, RVSP 39 mmHg  10/30/14 Echo ef 50%       Current Outpatient Medications   Medication Sig Dispense Refill    aspirin 81 MG chewable tablet Take 1 tablet by mouth daily 90 tablet 3    metoprolol succinate (TOPROL XL) 25 MG extended release tablet Take 0.5 tablets by mouth daily 30 tablet 0    predniSONE (DELTASONE) 10 MG tablet Take 1 tablet by mouth daily 30 tablet 0    melatonin 10 MG CAPS capsule Take 1 capsule by mouth nightly 30 capsule 0    clopidogrel (PLAVIX) 75 MG tablet Take 1 tablet by mouth daily 90 tablet 3    Vitamin D (CHOLECALCIFEROL) 50 MCG (2000 UT) TABS tablet Take 1 tablet by mouth daily 90 tablet 0    SYNTHROID 25 MCG tablet 50 mcg        No current facility-administered medications for this visit.      Allergies: Lisinopril, Cellcept [mycophenolate], Hydrochlorothiazide, Hydroxychloroquine sulfate, Maxalt [rizatriptan], and Statins [statins]  Past Medical History:   Diagnosis Date    Dizziness     H/O thyroidectomy 2015    2 non-malginant masses    History of mitral valve repair     Hypothyroidism     [facility]     Migraine     [facility]     Palliative care patient 01/10/2022    PONV (postoperative nausea and vomiting)     TIA (transient ischemic attack) 2006     Past Surgical History:   Procedure Laterality Date    BACK SURGERY      CARDIAC CATHETERIZATION  2006    MVP    CORONARY ARTERY BYPASS GRAFT  2006    HAND TENDON SURGERY Right 3/29/2019    MCP JOINT RIGHT HAND [facility] Crow Fern[facility]dez DO [facility] THYROIDECTOMY  2014    TONSILLECTOMY       Family History   Problem Relation Age of Onset    Diabetes Sister         passed away due to Diabetes    Heart Disease Brother     High Cholesterol Brother     High Blood Pressure Brother     High Blood Pressure Sister     High Blood Pressure Sister     High Blood Pressure Brother     High Cholesterol Brother     High Blood Pressure Brother     High Cholesterol Brother     Cancer Brother     High Blood Pressure Brother     High Cholesterol Brother     Cancer Brother     High Blood Pressure Brother     High Cholesterol Brother      Social History     Tobacco Use    Smoking status: Former Smoker     Packs/day: 0.00     Years: 0.00     Pack years: 0.00     Types: Cigarettes    Smokeless tobacco: Never Used   Substance Use Topics    Alcohol use: Not Currently       Review of Systems    Constitutional  no significant activity change, appetite change, or unexpected weight change. No fever or chills. No diaphoresis or significant fatigue. HENT  no significant rhinorrhea or epistaxis. No tinnitus or significant hearing loss. Eyes  no sudden vision change or amaurosis. Respiratory  no significant shortness of breath, wheezing, or stridor. No apnea, cough, or chest tightness associated with shortness of breath. Cardiovascular  no chest pain, syncope, or significant dizziness. No palpitations or significant leg swelling. (see HPI)  Gastrointestinal  no abdominal swelling or pain. No blood in stool. No severe constipation, diarrhea, nausea, or vomiting. Genitourinary  No difficulty urinating, dysuria, frequency, or urgency. No flank pain or hematuria. Musculoskeletal  no back pain, gait disturbance, or myalgia. Skin  no color change, rash, pallor, or new wound. Neurologic  no dizziness, facial asymmetry, or light headedness. No seizures. No speech difficulty or lateralizing weakness. Hematologic  no easy bruising or excessive bleeding. Psychiatric  no severe anxiety or nervousness. No confusion. All other review of systems are negative. Physical Exam    BP (!) 152/84   Pulse 76   Temp 97.9 °F (36.6 °C)   Ht 5' 4\" (1.626 m)   SpO2 98%   BMI 12.87 kg/m²     Constitutional  well developed, well nourished. No diaphoresis or acute distress. HENT  head normocephalic. Right external ear canal appears normal.  Left external ear canal appears normal.  Septum appears midline. Eyes  conjunctiva normal.  EOMS normal.  No exudate. No icterus.   Neck- ROM appears normal, no tracheal deviation. Cardiovascular  Regular rate and rhythm. Heart sounds are normal.  No murmur, rub, or gallop. Carotid pulses are 2+ to palpation bilaterally without bruit. Extremities - Radial and ulnar pulses are 2+ to palpation bilaterally. Femoral pulses are palpable. DP and PT pulses are palpable. She has bruising noted in the right groin and suprapubic area. No skin disruption noted overlying hematoma. No cyanosis, clubbing, or significant edema. No signs atheroembolic event. Pulmonary  effort appears normal.  No respiratory distress. Lungs - Breath sounds normal. No wheezes or rales. GI - Abdomen  soft, non tender, bowel sounds X 4 quadrants. No guarding or rebound tenderness. No distension or palpable mass. Genitourinary  deferred. Musculoskeletal  ROM appears normal.  No significant edema. Neurologic  alert and oriented X 3. Physiologic. Skin  warm, dry, and intact. No rash, erythema, or pallor. Psychiatric  mood, affect, and behavior appear normal.  Judgment and thought processes appear normal.    Risk factors for atherosclerosis of all vascular beds have been reviewed with the patient including:  Family history, tobacco abuse in all forms, elevated cholesterol, hyperlipidemia, and diabetes.       Right groin scan - (reviewed by Dr. John Villeda)          This is a follow up study of right groin pseudoaneurysm/ hematoma.   Irma Merchant is no evidence of pseudoaneurysm.    Hematoma measuring 2.0 x 0.6cm.    No DVT seen in the proximal veins.        Signature        ----------------------------------------------------------------    Electronically signed by John Jiménez(Interpreting    physician) on 01/26/2022 02:22 PM    ----------------------------------------------------------------       Velocities are measured in cm/s ; Diameters are measured in mm       LE Duplex Measurements       +---------------++-----+-----+----+-----------++---+-----+---+-------------+ !               ! ! Right!     !Left!           !!   !     !   !             !   +---------------++-----+-----+----+-----------++---+-----+---+-------------+   ! Location       !!PSV  !Ratio! EDV ! Wave Desc. !!PSV! Ratio! EDV! Wave Desc.   !   +---------------++-----+-----+----+-----------++---+-----+---+-------------+   ! Common Femoral !!104  !     !    !           !!   !     !   !             !   +---------------++-----+-----+----+-----------++---+-----+---+-------------+   ! Prox PFA       !!155  !     !    !           !!   !     !   !             !   +---------------++-----+-----+----+-----------++---+-----+---+-------------+   ! Prox SFA       !!145  !     !    !           !!   !     !   !             !   +---------------++-----+-----+----+-----------++---+-----+---+-------------+   Velocities are measured in cm/s ; Diameters are measured in mm       Right Lower Extremities DVT Study Measurements   Right 2D Measurements   +------------------------------------+----------+---------------+----------+   ! Location                            ! Visualized! Compressibility! Thrombosis! +------------------------------------+----------+---------------+----------+   ! Common Femoral                      ! Yes       ! Yes            !None      !   +------------------------------------+----------+---------------+----------+   ! Prox Femoral                        ! Yes       ! Yes            !None      !   +------------------------------------+----------+---------------+----------+       Assessment      1. Pseudoaneurysm ([facility]    2. PVD (peripheral vascular disease) ([facility]          Plan      We will plan to follow-up as needed. I instructed her to call our office if she noticed any skin disruption over the hematoma and increased swelling redness or significant pain in her right groin or leg. She verbalized understanding      Please note that parts of the chart were generated using Dragon dictation software.  Although every effort was made to ensure the accuracy of this automated transcription, some errors in transcription may have occurred.

## 2022-01-28 ENCOUNTER — CARE COORDINATION (OUTPATIENT)
Dept: CASE MANAGEMENT | Age: 77
End: 2022-01-28

## 2022-01-28 NOTE — CARE COORDINATION
[facility] Transitions Follow Up Call    2022    Patient: Benigno Hamm  Patient : 1945   MRN: [id number]    Discharge Date: 22 RARS: Readmission Risk Score: 16.6 ( )         Spoke with: N/A    Care Transitions Subsequent and Final Call    Subsequent and Final Calls  Are you currently active with any services?: Home Health  Care Transitions Interventions  Other Interventions:         Attempted to make contact with patient/caregiver for a routine follow up call without success. Unable to leave a HIPAA compliant message regarding intent of call and call back information. Call went to an unidentifiable voice messaging sytem (mobile voice mail or telephone answering machine). As patient is with a non [facility] provider, will sign of at this time.       Follow Up  Future Appointments   Date Time Provider Madhu Shepard   2022  1:15 PM JEREMY [place] [facility]   2022 11:00 AM MD FLAKITO Piña [facility]       Kayode Cleaning RN

## 2022-02-08 ENCOUNTER — OFFICE VISIT (OUTPATIENT)
Dept: CARDIOLOGY CLINIC | Age: 77
End: 2022-02-08
Payer: MEDICARE

## 2022-02-08 VITALS
WEIGHT: 75 LBS | BODY MASS INDEX: 12.8 KG/M2 | SYSTOLIC BLOOD PRESSURE: 124 MMHG | OXYGEN SATURATION: 97 % | DIASTOLIC BLOOD PRESSURE: 80 MMHG | HEIGHT: 64 IN | HEART RATE: 80 BPM

## 2022-02-08 DIAGNOSIS — Z98.890 S/P MVR (MITRAL VALVE REPAIR): ICD-10-CM

## 2022-02-08 DIAGNOSIS — I47.1 SVT (SUPRAVENTRICULAR TACHYCARDIA) (HCC): ICD-10-CM

## 2022-02-08 DIAGNOSIS — I10 ESSENTIAL HYPERTENSION: ICD-10-CM

## 2022-02-08 DIAGNOSIS — Z98.61 S/P PTCA (PERCUTANEOUS TRANSLUMINAL CORONARY ANGIOPLASTY): ICD-10-CM

## 2022-02-08 DIAGNOSIS — I25.10 CORONARY ARTERY DISEASE INVOLVING NATIVE CORONARY ARTERY OF NATIVE HEART WITHOUT ANGINA PECTORIS: Primary | ICD-10-CM

## 2022-02-08 PROCEDURE — 1111F DSCHRG MED/CURRENT MED MERGE: CPT | Performed by: NURSE PRACTITIONER

## 2022-02-08 PROCEDURE — G8400 PT W/DXA NO RESULTS DOC: HCPCS | Performed by: NURSE PRACTITIONER

## 2022-02-08 PROCEDURE — 1123F ACP DISCUSS/DSCN MKR DOCD: CPT | Performed by: NURSE PRACTITIONER

## 2022-02-08 PROCEDURE — 1090F PRES/ABSN URINE INCON ASSESS: CPT | Performed by: NURSE PRACTITIONER

## 2022-02-08 PROCEDURE — 99214 OFFICE O/P EST MOD 30 MIN: CPT | Performed by: NURSE PRACTITIONER

## 2022-02-08 PROCEDURE — 1036F TOBACCO NON-USER: CPT | Performed by: NURSE PRACTITIONER

## 2022-02-08 PROCEDURE — G8484 FLU IMMUNIZE NO ADMIN: HCPCS | Performed by: NURSE PRACTITIONER

## 2022-02-08 PROCEDURE — G8427 DOCREV CUR MEDS BY ELIG CLIN: HCPCS | Performed by: NURSE PRACTITIONER

## 2022-02-08 PROCEDURE — 4040F PNEUMOC VAC/ADMIN/RCVD: CPT | Performed by: NURSE PRACTITIONER

## 2022-02-08 PROCEDURE — G8419 CALC BMI OUT NRM PARAM NOF/U: HCPCS | Performed by: NURSE PRACTITIONER

## 2022-02-08 RX ORDER — METOPROLOL SUCCINATE 25 MG/1
12.5 TABLET, EXTENDED RELEASE ORAL DAILY
Qty: 30 TABLET | Refills: 5 | Status: SHIPPED | OUTPATIENT
Start: 2022-02-08

## 2022-02-08 NOTE — PATIENT INSTRUCTIONS
Coronary Artery Disease   (CAD; Coronary Atherosclerosis; Silent MI; Coronary Heart Disease; Ischemic Heart Disease; Atherosclerosis of the Coronary Arteries)     Definition   Coronary arteries bring oxygen rich blood to the heart muscle. Coronary artery disease (CAD) is blockage of these arteries. If the blockage is complete, areas of the heart muscle may be damaged. In severe case the heart muscle dies. This can lead to a heart attack, also known as a myocardial infarction (MI). Coronary artery disease is the most common form of heart disease. It is the leading cause of death worldwide. Causes include: Thickening of the walls of the arteries feeding the heart muscle   Accumulation of fatty plaques within the coronary arteries   Sudden spasm of a coronary artery   Narrowing of the coronary arteries   Inflammation within the coronary arteries   Development of a blood clot within the coronary arteries that blocks blood flow      Major risk factors include:   Sex: male (men have a greater risk of heart attack than women)   Age: 39 and older for men, 54 and older for women   Heredity: strong family history of heart disease   Obesity and being overweight   Smoking   High blood pressure   Sedentary lifestylePoor fitness can also increase your risk of CAD and premature death. High cholesterol (specifically, high LDL cholesterol, and low HDL cholesterol)   Diabetes   Metabolic syndrome (combination of high blood pressure, abdominal obesity, and insulin resistance)     Other risk factors may include:   Sleep Apnea  Stress   Excessive alcohol use   Depression   A diet that is high in saturated fat, trans fat, cholesterol, and/or caloriesDrinking sugary beverages on a regular basis may increase your risk of CAD. Symptoms   CAD may progress without any symptoms. Angina is chest pain that comes and goes. It often has a squeezing or pressure-like quality. It may radiate into the shoulder(s), arm(s), or jaw. Angina usually lasts for about 2-10 minutes. It is often relieved with rest. Angina can be triggered by:   Exercise or exertion   Emotional stress   Cold weather   A large meal   Chest pain may indicate more serious unstable angina or a heart attack if: It is unrelieved by rest or nitroglycerin   Severe angina   Angina that begins at rest (with no activity)   Angina that lasts more than 15 minutes   Accompanying symptoms may include:   Shortness of breath   Sweating   Nausea   Weakness   Immediate medical attention is needed for unstable angina. CAD in women may cause less classic chest pain. It is likely to start with shortness of breath and fatigue. Diagnosis   If you go to the emergency room with chest pain, some tests will be done right away. The tests will attempt to see if you are having angina or a heart attack. If you have a stable pattern of angina, other tests may be done to determine the severity of your disease. The doctor will ask about your symptoms and medical history. A physical exam will be done.    Tests may include:   Blood tests to look for certain substances in the blood called troponins which help the doctor determine if you are having a heart attack   Electrocardiogram (ECG, EKG) records the heart's activity by measuring electrical currents through the heart muscle, and can reveal evidence of past heart attacks, acute heart attacks, and heart rhythm problems   Echocardiogram uses high-frequency sound waves (ultrasound) to examine the size, shape, and motion of the heart, giving information about the structure and function of the heart   Exercise stress test records the heart's electrical activity during increased physical activity   Nuclear stress test the heart is observed while exercising and radioactive material highlights impaired blood flow to help locate problem areas   Coronary calcium scoring a type of x-ray called a CAT scan that uses a computer to look for the presence of calcium in the heart arteries   Coronary angiography x-rays taken after a dye is injected into the arteries to allows the doctor to look for abnormalities in the arteries   Treatment   Treatment may include:   Nitroglycerin   This medicine is usually given during an attack of angina. It can be given as a tablet that dissolves under the tongue or as a spray. Longer-lasting types can be used to prevent angina before an activity known to cause it. These may be given as pills or applied as patches or ointments. Blood-Thinning Medications   A small, daily dose of aspirin has been shown to decrease the risk of heart attack. Ask your doctor before taking aspirin daily. Warfarin (Coumadin)   Ticlopidine (Ticlid)   Clopidogrel (Plavix)   Beta-Blockers, Calcium-Channel Blockers, and ACE-Inhibitors   These may help prevent angina. In some cases, they may lower the risk of heart attack. Medications to Lower Cholesterol   Medicines, like statins, are often prescribed to people who have CAD. Statins (eg, atorvastatin [Lipitor]) lower cholesterol levels, which can help to prevent CAD events. Revascularization   Patients with severe blockages in their coronary arteries may benefit from procedures to immediately improve blood flow to the heart muscle:   Percutaneous coronary interventions (PCI)such as balloon angioplasty , in some cases, a wire mesh stent is placed to hold the artery open   Coronary artery bypass grafting (CABG) segments of vessels are taken from other areas of the body and are sewn into the heart arteries to reroute blood flow around blockages   Some studies have shown that CABG may be more effective than PCI. Lifestyle changes and intensive medicine may also be just as effective as PCI.    Options for Refractory Angina   For patients who are not candidates for revascularization procedures but have continued angina despite medicine, options include:   Enhanced external counterpulsation (EECP) large air bags are inflated around the legs in tune with the heart beat. The patient receives 5 one-hour treatments per week for seven weeks. This has been shown to reduce angina and may improve symptom-free exercise duration. Transmyocardial revascularization (TMR) surgical procedure done with laser to reduce chest pain. Researchers are also studying gene therapy as a possible treatment. Prevention   To reduce your risk of getting coronary artery disease:   Maintain a healthy weight. Eat a heart healthy diet that is low in saturated fat , red meat and processed meats, and rich in whole grain , fruits, and vegetables . Begin a safe exercise program with the advice of your doctor. If you smoke, quit . Treat your high blood pressure and/or diabetes. Treat high cholesterol or triglycerides. Ask your doctor about taking a low-dose aspirin every day. In certain patients, taking rosuvastatin (Crestor) may be another option. Talk to your doctor. Hypertension  (High Blood Pressure)  Most people with high blood pressure (hypertension) have no symptoms. High blood pressure can be a dangerous problem. Hypertension is dangerous because you may have it and not know it. High blood pressure may mean that your heart needs to work harder to pump blood. Your blood pressure is measured with 2 numbers. The first number is when your heart flexes (contracts), and the second number is when your heart relaxes. The higher the numbers are, the more you are at risk for problems. Write down your blood pressure today. The best blood pressure for adults is 120/80 (mmHg) or lower. It is likely that your blood pressure was recorded at least 2 times today. It is important for you to give these numbers to your doctor. If you do not have a doctor, try to get follow-up care at a hospital or community clinic. You may need to start high blood pressure medicine. You may also need to adjust your medicines as told by your doctor.  Even mild high blood pressure increases long-term health risks. One high reading does not mean you have hypertension. · Your blood pressure should be taken when you are relaxed. It is also important to sit for about 10 minutes before being tested. · Things that can increase your blood pressure are:  Injury, Illness, Stress, Caffeine, and some medicines (like decongestants). · High blood pressure does not usually need emergency treatment. HOME CARE  · Lifestyle and medicine changes may be needed, including:   · Weight loss. · Exercise. · Limit the use of salt. · Stop smoking. · If using decongestants or birth control pills, talk to your doctor. These medicines might make blood pressure higher. · Do not use street drugs. · Females should not drink more than 1 alcoholic drink per day. Males should not drink more than 2 alcoholic drinks per day. · Take your blood pressure medicine. You will need to take it every day. If you do not get treated, there are risks, including:   · Heart disease. · Stroke. · Kidney failure. · See your doctor as told. GET HELP RIGHT AWAY IF:  · You get a very bad headache. · You get blurred or changing vision. · You feel confused. · You feel weak, numb, or faint. · You get chest or belly (abdominal) pain. · You throw up (vomit). · You cannot breathe very well. If you have a blood pressure reading with a top number of 180 or higher, you need to see your doctor right away. This is especially true if you are having any of the problems listed above.

## 2022-02-08 NOTE — Clinical Note
I have put in referral for cardiac rehab. She would like to to have this done at [facility] due to location.

## 2022-02-08 NOTE — PROGRESS NOTES
Dear Dr. Samuel De Los Santos MD,    Thank you for allowing me to participate in the care of Ms. Jewels Gage. She presents today at the [address] [place] [address] in the [facility]. As you know, Ms. Jose Garcia is a 64 y.o. female with history of hypertension, hypothyroidism, migraines, myasthenia gravis, TIA,and s/p Mitral valve repair (2006) who presents with the chief complaint of follow-up post hospitalization. She is a patient of Dr. Damián Aceves. She presented to the ER via EMS on 1/8. Due to worsening chest pain with dizziness and a fall. EKG changes noted with elevated troponin in the ER. He was also noted to be positive for COVID-19. She had a heart catheterization which showed obstructive CAD requiring 2 stents. She had a right groin hematoma following the procedure. Hours after the heart cath she became bradycardic with heart rate in the 20s and hypotensive. She was given atropine and transferred to CCU. CT of abdomen and pelvis showed significant extension of hematoma with intramuscular hemorrhage and abdominis rectus and right groin. She was discharged on 1/12. She states that she feels great. She is having no chest pain or shortness of breath. She denies any fast or slow heart rhythms. She follow-up with vascular on 126 regarding hematoma and was released to follow-up as needed. He is ready to start cardiac rehab.she is sleeping on 1 pillow at night. she is not waking gasping for air. she denies any swelling. she has been compliant with her medications. her BP has been controlled. PCP follows labs and lipids. She otherwise denies chest pain, PND, orthopnea, syncope or near syncope. She has no other complaints. Review of Systems   Constitutional: Negative for fever, chills, diaphoresis, activity change, appetite change, fatigue and unexpected weight change. Eyes: Negative for photophobia, pain, redness and visual disturbance.    Respiratory:  Negative for apnea, cough, chest tightness, shortness of breath, wheezing and stridor. Cardiovascular: Negative for chest pain, palpitations, and leg swelling. Gastrointestinal: Negative for abdominal distention. Genitourinary: Negative for dysuria, urgency and frequency. Musculoskeletal: Negative for myalgias, arthralgias and gait problem. Skin: Negative for color change, pallor, rash and wound. Neurological: Negative for dizziness, tremors, speech difficulty, weakness and numbness. Hematological: Does not bruise/bleed easily. Psychiatric/Behavioral: Negative.         Past Medical History:   Diagnosis Date    Dizziness     H/O thyroidectomy 2015    2 non-malginant masses    History of mitral valve repair     Hypothyroidism     [facility] [address]     Migraine     Myasthenia gravis ([facility] [address] [facility]     Palliative care patient 01/10/2022    PONV (postoperative nausea and vomiting)     TIA (transient ischemic attack) 2006       Past Surgical History:   Procedure Laterality Date    BACK SURGERY      CARDIAC CATHETERIZATION  2006    MVP    CORONARY ARTERY BYPASS GRAFT  2006    HAND TENDON SURGERY Right 3/29/2019    MCP JOINT RIGHT HAND performed by Izaiah Howard DO at [address] [place] [address] THYROIDECTOMY  2014    TONSILLECTOMY         Family History   Problem Relation Age of Onset    Diabetes Sister         passed away due to Diabetes    Heart Disease Brother     High Cholesterol Brother     High Blood Pressure Brother     High Blood Pressure Sister     High Blood Pressure Sister     High Blood Pressure Brother     High Cholesterol Brother     High Blood Pressure Brother     High Cholesterol Brother     Cancer Brother     High Blood Pressure Brother     High Cholesterol Brother     Cancer Brother     High Blood Pressure Brother     High Cholesterol Brother        Social History     Socioeconomic History    Marital status:      Spouse name: Not on file    Number of children: Not on file    Years of education: Not on file    Highest education level: Not on file   Occupational History    Not on file   Tobacco Use    Smoking status: Former Smoker     Packs/day: 0.00     Years: 0.00     Pack years: 0.00     Types: Cigarettes    Smokeless tobacco: Never Used   Vaping Use    Vaping Use: Never used   Substance and Sexual Activity    Alcohol use: Not Currently    Drug use: Never    Sexual activity: Not on file   Other Topics Concern    Not on file   Social History Narrative    Not on file     Social Determinants of Health     Financial Resource Strain:     Difficulty of Paying Living Expenses: Not on file   Food Insecurity:     Worried About Running Out of Food in the Last Year: Not on file    Briseida of Food in the Last Year: Not on file   Transportation Needs:     Lack of Transportation (Medical): Not on file    Lack of Transportation (Non-Medical):  Not on file   Physical Activity:     Days of Exercise per Week: Not on file    Minutes of Exercise per Session: Not on file   Stress:     Feeling of Stress : Not on file   Social Connections:     Frequency of Communication with Friends and Family: Not on file    Frequency of Social Gatherings with Friends and Family: Not on file    Attends Yazidi Services: Not on file    Active Member of [address] [place] [address] [employer] or Organizations: Not on file    Attends Club or Organization Meetings: Not on file    Marital Status: Not on file   Intimate Partner Violence:     Fear of Current or Ex-Partner: Not on file    Emotionally Abused: Not on file    Physically Abused: Not on file    Sexually Abused: Not on file   Housing Stability:     Unable to Pay for Housing in the Last Year: Not on file    Number of [place] in the Last Year: Not on file    Unstable Housing in the Last Year: Not on file       Allergies   Allergen Reactions    Lisinopril Anaphylaxis     Tongue swelling    Cellcept [Mycophenolate]     Hydrochlorothiazide     Hydroxychloroquine Sulfate     Maxalt [Rizatriptan] Hives    Statins [Statins]          Current Outpatient Medications:     aspirin 81 MG chewable tablet, Take 1 tablet by mouth daily, Disp: 90 tablet, Rfl: 3    metoprolol succinate (TOPROL XL) 25 MG extended release tablet, Take 0.5 tablets by mouth daily, Disp: 30 tablet, Rfl: 0    predniSONE (DELTASONE) 10 MG tablet, Take 1 tablet by mouth daily, Disp: 30 tablet, Rfl: 0    clopidogrel (PLAVIX) 75 MG tablet, Take 1 tablet by mouth daily, Disp: 90 tablet, Rfl: 3    Vitamin D (CHOLECALCIFEROL) 50 MCG (2000 UT) TABS tablet, Take 1 tablet by mouth daily, Disp: 90 tablet, Rfl: 0    SYNTHROID 25 MCG tablet, 100 mcg , Disp: , Rfl:     melatonin 10 MG CAPS capsule, Take 1 capsule by mouth nightly (Patient not taking: Reported on 2/8/2022), Disp: 30 capsule, Rfl: 0    PE:  Vitals:    02/08/22 1322   BP: 124/80   Pulse: 80   SpO2: 97%       Estimated body mass index is 12.87 kg/m² as calculated from the following:    Height as of this encounter: 5' 4\" (1.626 m). Weight as of this encounter: 75 lb (34 kg). Constitutional: She is oriented to person, place, and time. She appears well-developed and well-nourished in no acute distress. Head: Normocephalic and atraumatic. Neck:  Neck supple without JVD present. Cardiovascular: Normal rate, Normal rhythm, normal heart sounds. No murmur ascultated. No gallop and no friction rub. No carotid bruits. No peripheral edema. Pulmonary/Chest:  Lungs clear to auscultation bilaterally without evidence of respiratory distress. She without wheezes. She without rales or ronchi. Musculoskeletal: Normal range of motion. Gait is normal.   Neurological: She is alert and oriented to person, place, and time. Skin: Skin is warm and dry without rash or pallor. Psychiatric: She has a normal mood and affect.  Her behavior is normal. Thought content normal.     Lab Results   Component Value Date    CREATININE 0.5 01/12/2022    CREATININE 0.6 01/11/2022 CREATININE 0.8 01/10/2022    HGB 10.8 01/12/2022    HGB 9.4 01/11/2022    HGB 9.5 01/11/2022    PROBNP 3,339 01/08/2022      Cath- 1/9/22   Patient tolerated the procedure well. Successful PCI / Drug Eluting Stent of the proximal Right Coronary Artery. Successful PCI / Drug Eluting Stent of the proximal Posterior Descending   Coronary Artery. Recommendations      Aggressive risk factor management. Aggressive medical therapy for coronary artery disease. TTE- 1/8/22  There has been a mitral valve repair. Mitral valve leaflets are mildly thickened with preserved leaflet   mobility. Mild mitral regurgitation is present. Mean transmitral gradient (Doppler) 3 mmHg . Mildly thickened aortic valve leaflets with preserved leaflet mobility. Aortic valve appears to be tricuspid. No evidence of aortic stenosis;   Mild aortic regurgitation is noted. Tricuspid valve is structurally normal.   Trivial tricuspid regurgitation. Trace pulmonic regurgitation present. Mildly dilated left atrium. Normal left ventricular size with preserved LV function and an estimated   ejection fraction of approximately 66%. No evidence of left ventricular mass or thrombus noted. Mild concentric left ventricular hypertrophy. Normal right atrial dimension with no evidence of thrombus or mass noted. Normal right ventricular size with preserved RV function. Aortic root dimension within normal limits. Dilated IVC with <50% insp collapse   No evidence of significant pericardial effusion is noted. No evidence of pleural effusion. Assessment, Recommendations, & Plan:  64 y.o. female with CAD,/P PTCA, HTN, S/P MVR,  & SVT    CAD s/p PTCA-controlled on ASA, Plavix, & Toprol. She is allergic to all statins. She has been instructed that she cannot miss a dose of ASA or Plavix for any reason 1 year post stent placement. She is agreeable to cardiac rehab.  We will [facility] to [facility].    HTN- Controlled on Toprol. Continue current regimen    S/P MVR-repair is functioning appropriately. Monitor    SVT- Controlled on Toprol. Continue current regimen    Disposition - RTC in 3 months with Dr. Fernanda Smith or sooner if needed      Please do not hesitate to contact me for any questions or concerns.     Sincerely yours,    JEREMY Hernandez

## 2022-04-25 ENCOUNTER — TELEPHONE (OUTPATIENT)
Dept: CARDIOLOGY CLINIC | Age: 77
End: 2022-04-25

## 2022-04-25 NOTE — TELEPHONE ENCOUNTER
Called and was unable to leave VM for patient to reschedule due to [place] out phone was busy, BW 4/25

## 2022-09-04 NOTE — PROGRESS NOTES
Aranza Kay is a 66 y.o. female evaluated via telephone on 11/10/2020. Consent:  She and/or health care decision maker is aware that that she may receive a bill for this telephone service, depending on her insurance coverage, and has provided verbal consent to proceed: Yes      Documentation:  I communicated with the patient and/or health care decision maker about hypertension and blood pressure medication adjustment. Details of this discussion including any medical advice provided: Thank you for allowing me to participate in the care of Ms. Tati Dorado. She presents today at the [address] [place] [address] in the [facility]. As you know, Ms. Jimbo Alvarez is a 66 y.o. female with history of hypertension, hypothyroidism, migraines, myasthenia gravis, TIA,and s/p Mitral valve repair (2006) who presents with the chief complaint of follow-up for BP check and medication adjustment. She is a patient of Dr. Margarita Perales. At last visit her clock hydrochlorothiazide was stopped due to allergic reaction. She was started on Lasix every other day. She tried taking the Cardizem 120 mg daily however was unable to swallow due to the capsule size. She has been on the Cardizem 60 mg twice a day. She has been checking her blood pressure at home and had been controlled. This morning it was 124/73. She has had some mild orthostatic symptoms however has regulated this in relation to when she takes her losartan in the morning. She denies any chest pain or shortness of breath. She denies any fast or slow heart rhythms. she is sleeping on 1 pillow at night. she is not waking gasping for air. she denies any swelling. she has been compliant with her medications. her BP has been controlled. PCP follows labs and lipids. Ms. Jimbo Alvarez was present at her home in [place], [place] during the phone visit.   I was present at the [facility] office in [place], [place] during the phone Arrives via squad from [facility] and complains of alcohol withdrawal. He was medically cleared at [facility] and admitted to [facility]. While at [facility], his symptoms became more than what could be handled there.   His last drink of alcohol was yesterday afternoon 1400.   He says he had drunk one liter of vodka during the prior 24 hours.   He had been drinking about one liter of vodka daily for several months.   He had also been abusing anabolic steroids. His last use was about two weeks ago.   Denies other drugs.   Denies suicidal thoughts.   Noted to be jaundiced in the triage room.    visit. Miguel Sebastian, medical assistant,  assisted me with a phone visit. Review of Systems  Constitutional: Negative for fever, chills, diaphoresis, activity change, appetite change, fatigue and unexpected weight change. Eyes: Negative for photophobia, pain, redness and visual disturbance. Respiratory: Negative for apnea, cough, chest tightness, shortness of breath, wheezing and stridor. Cardiovascular: Negative for chest pain, palpitations and leg swelling. Gastrointestinal: Negative for abdominal distention. Genitourinary: Negative for dysuria, urgency and frequency. Musculoskeletal: Negative for myalgias, arthralgias and gait problem. Skin: Negative for color change, pallor, rash and wound. Neurological: Negative for dizziness, tremors, speech difficulty, weakness and numbness. Hematological: Does not bruise/bleed easily. Psychiatric/Behavioral: Negative. Naheed Valle is a 66 y.o. female with the following history as recorded in EpicCare:  Patient Active Problem List    Diagnosis Date Noted    Traumatic rupture of volar plate of finger 89/80/3676    Shortness of breath 10/22/2014    Fatigue 10/22/2014    History of mitral valve repair     Mitral valve prolapse      Current Outpatient Medications   Medication Sig Dispense Refill    dilTIAZem (CARDIZEM) 60 MG tablet Take 60 mg by mouth 2 times daily      furosemide (LASIX) 20 MG tablet Take 1 tablet by mouth every other day 45 tablet 3    losartan (COZAAR) 50 MG tablet Take 0.5 tablets by mouth daily (Patient taking differently: Take 25 mg by mouth 2 times daily ) 30 tablet 5    SYNTHROID 25 MCG tablet TAKE ONE TABLET EVERY DAY      predniSONE (DELTASONE) 10 MG tablet Take 10 mg every other day; 15 mg days in between      brimonidine (ALPHAGAN) 0.2 % ophthalmic solution Place 1 drop into the right eye daily       SUMAtriptan (IMITREX) 100 MG tablet Take 100 mg by mouth once as needed for Migraine.       multivitamin [facility]) per tablet Take 1 tablet by mouth daily. No current facility-administered medications for this visit. Allergies: Lisinopril; Cellcept [mycophenolate]; Hydrochlorothiazide; Hydroxychloroquine sulfate; Maxalt [rizatriptan]; and Statins [statins]  Past Medical History:   Diagnosis Date    Dizziness     H/O thyroidectomy 2015    2 non-malginant masses    History of mitral valve repair     Hypothyroidism     Lupus ([address]     Migraine     Myasthenia gravis ([address]     PONV (postoperative nausea and vomiting)     TIA (transient ischemic attack) 2006     Past Surgical History:   Procedure Laterality Date    BACK SURGERY      CARDIAC CATHETERIZATION  2006    MVP    CORONARY ARTERY BYPASS GRAFT  2006    HAND TENDON SURGERY Right 3/29/2019    MCP JOINT RIGHT HAND performed by Arlen Moss DO at [facility] THYROIDECTOMY  2014    TONSILLECTOMY       Family History   Problem Relation Age of Onset    Diabetes Sister         passed away due to Diabetes    Heart Disease Brother     High Cholesterol Brother     High Blood Pressure Brother     High Blood Pressure Sister     High Blood Pressure Sister     High Blood Pressure Brother     High Cholesterol Brother     High Blood Pressure Brother     High Cholesterol Brother     Cancer Brother     High Blood Pressure Brother     High Cholesterol Brother     Cancer Brother     High Blood Pressure Brother     High Cholesterol Brother      Social History     Tobacco Use    Smoking status: Former Smoker     Packs/day: 0.00     Years: 0.00     Pack years: 0.00     Types: Cigarettes    Smokeless tobacco: Never Used   Substance Use Topics    Alcohol use: Not Currently     Frequency: Monthly or less        Assessment/ Plan:  HTN, SVT, & s/p MVR    HTN- Controlled at home, No changes    SVT- Controlled on Cardizem, No changes    S/p MVR-last TTE done in June 2020.   Repeat TTE in June 2021    Disposition: Return in about 6 months (around 5/10/2021) for HTN, with Dr. Avelina Hernandez. I affirm this is a Patient Initiated Episode with an Established Patient who has not had a related appointment within my department in the past 7 days or scheduled within the next 24 hours.     Patient identification was verified at the start of the visit: Yes    Total Time: minutes: 5-10 minutes    Note: not billable if this call serves to triage the patient into an appointment for the relevant concern      Alina Carreon

## 2022-11-08 ENCOUNTER — HOSPITAL ENCOUNTER (INPATIENT)
Age: 77
LOS: 2 days | Discharge: HOME HEALTH | DRG: 302 | End: 2022-11-10
Attending: EMERGENCY MEDICINE | Admitting: HOSPITALIST
Payer: MEDICARE

## 2022-11-08 ENCOUNTER — APPOINTMENT (OUTPATIENT)
Dept: GENERAL RADIOLOGY | Age: 77
DRG: 302 | End: 2022-11-08
Payer: MEDICARE

## 2022-11-08 DIAGNOSIS — J90 PLEURAL EFFUSION ON RIGHT: ICD-10-CM

## 2022-11-08 DIAGNOSIS — R07.89 OTHER CHEST PAIN: Primary | ICD-10-CM

## 2022-11-08 DIAGNOSIS — R19.7 DIARRHEA, UNSPECIFIED TYPE: ICD-10-CM

## 2022-11-08 DIAGNOSIS — I48.91 ATRIAL FIBRILLATION WITH RVR (HCC): ICD-10-CM

## 2022-11-08 PROBLEM — G70.00 MYASTHENIA GRAVIS (HCC): Status: ACTIVE | Noted: 2022-11-08

## 2022-11-08 LAB
ALBUMIN SERPL-MCNC: 3.9 G/DL (ref 3.5–5.2)
ALP BLD-CCNC: 90 U/L (ref 35–104)
ALT SERPL-CCNC: 15 U/L (ref 5–33)
ANION GAP SERPL CALCULATED.3IONS-SCNC: 12 MMOL/L (ref 7–19)
AST SERPL-CCNC: 27 U/L (ref 5–32)
BASOPHILS ABSOLUTE: 0 K/UL (ref 0–0.2)
BASOPHILS RELATIVE PERCENT: 0.4 % (ref 0–1)
BILIRUB SERPL-MCNC: 0.6 MG/DL (ref 0.2–1.2)
BILIRUBIN URINE: NEGATIVE
BLOOD, URINE: NEGATIVE
BUN BLDV-MCNC: 14 MG/DL (ref 8–23)
CALCIUM SERPL-MCNC: 9 MG/DL (ref 8.8–10.2)
CHLORIDE BLD-SCNC: 94 MMOL/L (ref 98–111)
CLARITY: CLEAR
CO2: 28 MMOL/L (ref 22–29)
COLOR: YELLOW
CREAT SERPL-MCNC: 0.6 MG/DL (ref 0.5–0.9)
EOSINOPHILS ABSOLUTE: 0.1 K/UL (ref 0–0.6)
EOSINOPHILS RELATIVE PERCENT: 1 % (ref 0–5)
GFR SERPL CREATININE-BSD FRML MDRD: >60 ML/MIN/{1.73_M2}
GLUCOSE BLD-MCNC: 123 MG/DL (ref 74–109)
GLUCOSE URINE: NEGATIVE MG/DL
HCT VFR BLD CALC: 36.5 % (ref 37–47)
HEMOGLOBIN: 12.1 G/DL (ref 12–16)
IMMATURE GRANULOCYTES #: 0 K/UL
KETONES, URINE: ABNORMAL MG/DL
LACTIC ACID: 2.6 MMOL/L (ref 0.5–1.9)
LEUKOCYTE ESTERASE, URINE: NEGATIVE
LYMPHOCYTES ABSOLUTE: 1.3 K/UL (ref 1.1–4.5)
LYMPHOCYTES RELATIVE PERCENT: 13.5 % (ref 20–40)
MAGNESIUM: 1.4 MG/DL (ref 1.6–2.4)
MCH RBC QN AUTO: 32.6 PG (ref 27–31)
MCHC RBC AUTO-ENTMCNC: 33.2 G/DL (ref 33–37)
MCV RBC AUTO: 98.4 FL (ref 81–99)
MONOCYTES ABSOLUTE: 0.6 K/UL (ref 0–0.9)
MONOCYTES RELATIVE PERCENT: 5.6 % (ref 0–10)
NEUTROPHILS ABSOLUTE: 7.9 K/UL (ref 1.5–7.5)
NEUTROPHILS RELATIVE PERCENT: 79.1 % (ref 50–65)
NITRITE, URINE: NEGATIVE
PDW BLD-RTO: 13.8 % (ref 11.5–14.5)
PH UA: 6.5 (ref 5–8)
PLATELET # BLD: 307 K/UL (ref 130–400)
PMV BLD AUTO: 9.7 FL (ref 9.4–12.3)
POTASSIUM SERPL-SCNC: 3.1 MMOL/L (ref 3.5–5)
PRO-BNP: 450 PG/ML (ref 0–1800)
PROTEIN UA: NEGATIVE MG/DL
RBC # BLD: 3.71 M/UL (ref 4.2–5.4)
SARS-COV-2, NAAT: NOT DETECTED
SODIUM BLD-SCNC: 134 MMOL/L (ref 136–145)
SPECIFIC GRAVITY UA: 1.01 (ref 1–1.03)
TOTAL PROTEIN: 6.6 G/DL (ref 6.6–8.7)
TROPONIN: 0.02 NG/ML (ref 0–0.03)
TROPONIN: 0.06 NG/ML (ref 0–0.03)
UROBILINOGEN, URINE: 0.2 E.U./DL
WBC # BLD: 9.9 K/UL (ref 4.8–10.8)

## 2022-11-08 PROCEDURE — 6370000000 HC RX 637 (ALT 250 FOR IP): Performed by: NURSE PRACTITIONER

## 2022-11-08 PROCEDURE — 2500000003 HC RX 250 WO HCPCS: Performed by: EMERGENCY MEDICINE

## 2022-11-08 PROCEDURE — 85025 COMPLETE CBC W/AUTO DIFF WBC: CPT

## 2022-11-08 PROCEDURE — 2580000003 HC RX 258: Performed by: NURSE PRACTITIONER

## 2022-11-08 PROCEDURE — 99285 EMERGENCY DEPT VISIT HI MDM: CPT

## 2022-11-08 PROCEDURE — 6360000002 HC RX W HCPCS: Performed by: EMERGENCY MEDICINE

## 2022-11-08 PROCEDURE — 84484 ASSAY OF TROPONIN QUANT: CPT

## 2022-11-08 PROCEDURE — 80053 COMPREHEN METABOLIC PANEL: CPT

## 2022-11-08 PROCEDURE — 87635 SARS-COV-2 COVID-19 AMP PRB: CPT

## 2022-11-08 PROCEDURE — 36415 COLL VENOUS BLD VENIPUNCTURE: CPT

## 2022-11-08 PROCEDURE — 1210000000 HC MED SURG R&B

## 2022-11-08 PROCEDURE — 96374 THER/PROPH/DIAG INJ IV PUSH: CPT

## 2022-11-08 PROCEDURE — 96361 HYDRATE IV INFUSION ADD-ON: CPT

## 2022-11-08 PROCEDURE — 87040 BLOOD CULTURE FOR BACTERIA: CPT

## 2022-11-08 PROCEDURE — 83605 ASSAY OF LACTIC ACID: CPT

## 2022-11-08 PROCEDURE — 6360000002 HC RX W HCPCS: Performed by: NURSE PRACTITIONER

## 2022-11-08 PROCEDURE — 83735 ASSAY OF MAGNESIUM: CPT

## 2022-11-08 PROCEDURE — 81003 URINALYSIS AUTO W/O SCOPE: CPT

## 2022-11-08 PROCEDURE — 83880 ASSAY OF NATRIURETIC PEPTIDE: CPT

## 2022-11-08 PROCEDURE — 2580000003 HC RX 258: Performed by: EMERGENCY MEDICINE

## 2022-11-08 PROCEDURE — 71045 X-RAY EXAM CHEST 1 VIEW: CPT

## 2022-11-08 RX ORDER — METOCLOPRAMIDE HYDROCHLORIDE 5 MG/ML
5 INJECTION INTRAMUSCULAR; INTRAVENOUS ONCE
Status: COMPLETED | OUTPATIENT
Start: 2022-11-08 | End: 2022-11-08

## 2022-11-08 RX ORDER — DILTIAZEM HYDROCHLORIDE 5 MG/ML
10 INJECTION INTRAVENOUS ONCE
Status: COMPLETED | OUTPATIENT
Start: 2022-11-08 | End: 2022-11-08

## 2022-11-08 RX ORDER — POTASSIUM CHLORIDE 20 MEQ/1
40 TABLET, EXTENDED RELEASE ORAL PRN
Status: DISCONTINUED | OUTPATIENT
Start: 2022-11-08 | End: 2022-11-10 | Stop reason: HOSPADM

## 2022-11-08 RX ORDER — ACETAMINOPHEN 650 MG/1
650 SUPPOSITORY RECTAL EVERY 6 HOURS PRN
Status: DISCONTINUED | OUTPATIENT
Start: 2022-11-08 | End: 2022-11-10 | Stop reason: HOSPADM

## 2022-11-08 RX ORDER — SODIUM CHLORIDE 0.9 % (FLUSH) 0.9 %
5-40 SYRINGE (ML) INJECTION EVERY 12 HOURS SCHEDULED
Status: DISCONTINUED | OUTPATIENT
Start: 2022-11-08 | End: 2022-11-10 | Stop reason: HOSPADM

## 2022-11-08 RX ORDER — POTASSIUM CHLORIDE 7.45 MG/ML
10 INJECTION INTRAVENOUS PRN
Status: DISCONTINUED | OUTPATIENT
Start: 2022-11-08 | End: 2022-11-10 | Stop reason: HOSPADM

## 2022-11-08 RX ORDER — 0.9 % SODIUM CHLORIDE 0.9 %
1000 INTRAVENOUS SOLUTION INTRAVENOUS ONCE
Status: COMPLETED | OUTPATIENT
Start: 2022-11-08 | End: 2022-11-08

## 2022-11-08 RX ORDER — ASPIRIN 81 MG/1
81 TABLET, CHEWABLE ORAL DAILY
Status: DISCONTINUED | OUTPATIENT
Start: 2022-11-09 | End: 2022-11-09 | Stop reason: SDUPTHER

## 2022-11-08 RX ORDER — ACETAMINOPHEN 325 MG/1
650 TABLET ORAL EVERY 6 HOURS PRN
Status: DISCONTINUED | OUTPATIENT
Start: 2022-11-08 | End: 2022-11-10 | Stop reason: HOSPADM

## 2022-11-08 RX ORDER — POLYETHYLENE GLYCOL 3350 17 G/17G
17 POWDER, FOR SOLUTION ORAL DAILY PRN
Status: DISCONTINUED | OUTPATIENT
Start: 2022-11-08 | End: 2022-11-10 | Stop reason: HOSPADM

## 2022-11-08 RX ORDER — PREDNISONE 10 MG/1
10 TABLET ORAL DAILY
Status: DISCONTINUED | OUTPATIENT
Start: 2022-11-08 | End: 2022-11-10 | Stop reason: HOSPADM

## 2022-11-08 RX ORDER — MAGNESIUM SULFATE 1 G/100ML
1000 INJECTION INTRAVENOUS PRN
Status: DISCONTINUED | OUTPATIENT
Start: 2022-11-08 | End: 2022-11-10 | Stop reason: HOSPADM

## 2022-11-08 RX ORDER — PREDNISONE 10 MG/1
10 TABLET ORAL DAILY
COMMUNITY

## 2022-11-08 RX ORDER — SODIUM CHLORIDE 0.9 % (FLUSH) 0.9 %
5-40 SYRINGE (ML) INJECTION PRN
Status: DISCONTINUED | OUTPATIENT
Start: 2022-11-08 | End: 2022-11-10 | Stop reason: HOSPADM

## 2022-11-08 RX ORDER — LEVOTHYROXINE SODIUM 0.1 MG/1
100 TABLET ORAL DAILY
Status: DISCONTINUED | OUTPATIENT
Start: 2022-11-08 | End: 2022-11-10 | Stop reason: HOSPADM

## 2022-11-08 RX ORDER — SODIUM CHLORIDE 9 MG/ML
INJECTION, SOLUTION INTRAVENOUS PRN
Status: DISCONTINUED | OUTPATIENT
Start: 2022-11-08 | End: 2022-11-10 | Stop reason: HOSPADM

## 2022-11-08 RX ORDER — ONDANSETRON 2 MG/ML
4 INJECTION INTRAMUSCULAR; INTRAVENOUS EVERY 6 HOURS PRN
Status: DISCONTINUED | OUTPATIENT
Start: 2022-11-08 | End: 2022-11-10 | Stop reason: HOSPADM

## 2022-11-08 RX ORDER — METOPROLOL SUCCINATE 25 MG/1
12.5 TABLET, EXTENDED RELEASE ORAL DAILY
Status: DISCONTINUED | OUTPATIENT
Start: 2022-11-09 | End: 2022-11-10 | Stop reason: HOSPADM

## 2022-11-08 RX ORDER — ASPIRIN 81 MG/1
81 TABLET, CHEWABLE ORAL DAILY
Status: DISCONTINUED | OUTPATIENT
Start: 2022-11-09 | End: 2022-11-10 | Stop reason: HOSPADM

## 2022-11-08 RX ORDER — ONDANSETRON 4 MG/1
4 TABLET, ORALLY DISINTEGRATING ORAL EVERY 8 HOURS PRN
Status: DISCONTINUED | OUTPATIENT
Start: 2022-11-08 | End: 2022-11-10 | Stop reason: HOSPADM

## 2022-11-08 RX ADMIN — MAGNESIUM SULFATE HEPTAHYDRATE 1000 MG: 1 INJECTION, SOLUTION INTRAVENOUS at 21:49

## 2022-11-08 RX ADMIN — SODIUM CHLORIDE 1000 ML: 9 INJECTION, SOLUTION INTRAVENOUS at 13:10

## 2022-11-08 RX ADMIN — TICAGRELOR 90 MG: 90 TABLET ORAL at 20:19

## 2022-11-08 RX ADMIN — SODIUM CHLORIDE, PRESERVATIVE FREE 10 ML: 5 INJECTION INTRAVENOUS at 20:20

## 2022-11-08 RX ADMIN — MAGNESIUM SULFATE HEPTAHYDRATE 1000 MG: 1 INJECTION, SOLUTION INTRAVENOUS at 19:46

## 2022-11-08 RX ADMIN — POTASSIUM CHLORIDE 40 MEQ: 1500 TABLET, EXTENDED RELEASE ORAL at 19:40

## 2022-11-08 RX ADMIN — METOCLOPRAMIDE 5 MG: 5 INJECTION, SOLUTION INTRAMUSCULAR; INTRAVENOUS at 16:05

## 2022-11-08 RX ADMIN — DILTIAZEM HYDROCHLORIDE 10 MG: 5 INJECTION, SOLUTION INTRAVENOUS at 13:09

## 2022-11-08 RX ADMIN — PREDNISONE 10 MG: 10 TABLET ORAL at 19:40

## 2022-11-08 ASSESSMENT — PAIN - FUNCTIONAL ASSESSMENT: PAIN_FUNCTIONAL_ASSESSMENT: NONE - DENIES PAIN

## 2022-11-08 ASSESSMENT — ENCOUNTER SYMPTOMS
SHORTNESS OF BREATH: 0
RHINORRHEA: 0
ABDOMINAL PAIN: 0
DIARRHEA: 1
SINUS PRESSURE: 0
NAUSEA: 0
SORE THROAT: 0
VOMITING: 0

## 2022-11-08 NOTE — ED PROVIDER NOTES
[facility]  eMERGENCY dEPARTMENT eNCOUnter      Pt Name: Caitlin Morin  MRN: [id number]  Innagfjessie 1945  Date of evaluation: 11/8/2022  Provider: Domitila Diop MD    CHIEF COMPLAINT       Chief Complaint   Patient presents with    Chest Pain     Pt arrives to ED via [address] with c/o chest pain. Pt states chest pain started this morning along with dizziness and nausea approx 0410. Pt took one nitro at home and four 81 mg aspirin with airevac. Pt states she had  heart attack in [facility] 2022    Hypotension     Per EMS pt was hypotensive. Hypotension resolved with 600ml fluid bolus. HISTORY OF PRESENT ILLNESS   (Location/Symptom, Timing/Onset,Context/Setting, Quality, Duration, Modifying Factors, Severity)  Note limiting factors. Caitlin Morin is a 76 y.o. female who presents to the emergency department chest pain and syncope, briefly     HPI    Patient is a 68-year-old white female with a history of CAG and  MI status post 2 stents in January 2022; hypertension; myasthenia gravis; TIA; mitral valve repair; hypothyroidism; who presents with an episode of chest pain and lightheadedness beginning about 410 this morning. Associated with nausea and lightheadedness and a brief episode of syncope while on the commode. She reports she had voluminous diarrhea yesterday. The pain lasted for several hours but gradually worsened to 8 out of 10. Associated with diaphoresis. Radiated to both arms and throughout her chest.  Became hypotensive but nonetheless took 3 nitroglycerin which did resolve her chest pain. When EMS arrived she was hypotensive but improved rapidly with fluid and was found to be in A. fib with RVR which is a new diagnosis for her. She is not anticoagulated. She currently is pain-free in the emergency department. NursingNotes were reviewed.     REVIEW OF SYSTEMS    (2-9 systems for level 4, 10 or more for level 5)     Review of Systems   Constitutional:  Negative for chills, diaphoresis, fatigue and fever. HENT:  Negative for rhinorrhea, sinus pressure and sore throat. Eyes:  Negative for visual disturbance. Respiratory:  Negative for shortness of breath. Cardiovascular:  Positive for chest pain. Gastrointestinal:  Positive for diarrhea. Negative for abdominal pain, nausea and vomiting. Genitourinary:  Negative for difficulty urinating and dysuria. Musculoskeletal:  Negative for arthralgias and myalgias. Skin:  Negative for rash. Neurological:  Positive for syncope. Negative for weakness. Psychiatric/Behavioral:  Negative for confusion. All other systems reviewed and are negative.          PAST MEDICALHISTORY     Past Medical History:   Diagnosis Date    Dizziness     H/O thyroidectomy 2015    2 non-malginant masses    History of mitral valve repair     Hypothyroidism     Lupus ([facility]     MI (myocardial infarction) ([facility] 01/2022    Migraine     Myasthenia gravis ([facility]     Palliative care patient 01/10/2022    PONV (postoperative nausea and vomiting)     TIA (transient ischemic attack) 2006         SURGICAL HISTORY       Past Surgical History:   Procedure Laterality Date    BACK SURGERY      CARDIAC CATHETERIZATION  2006    MVP    CORONARY ARTERY BYPASS GRAFT  2006    HAND TENDON SURGERY Right 3/29/2019    MCP JOINT RIGHT HAND performed by Bethany Balderas DO at [address]  2014    TONSILLECTOMY           CURRENT MEDICATIONS     Current Discharge Medication List        CONTINUE these medications which have NOT CHANGED    Details   predniSONE (DELTASONE) 10 MG tablet Take 10 mg by mouth daily      ticagrelor (BRILINTA) 90 MG TABS tablet Take 1 tablet by mouth 2 times daily  Qty: 60 tablet, Refills: 5      metoprolol succinate (TOPROL XL) 25 MG extended release tablet Take 0.5 tablets by mouth daily  Qty: 30 tablet, Refills: 5      aspirin 81 MG chewable tablet Take 1 tablet by mouth daily  Qty: 90 tablet, Refills: 3      Vitamin D (CHOLECALCIFEROL) 50 MCG (2000 UT) TABS tablet Take 1 tablet by mouth daily  Qty: 90 tablet, Refills: 0    Comments: Labeling may look different. 25 mcg=1000 Units. Please double check dosages. SYNTHROID 25 MCG tablet 100 mcg              ALLERGIES     Lisinopril, Cellcept [mycophenolate], Hydrochlorothiazide, Hydroxychloroquine sulfate, Maxalt [rizatriptan], and Statins [statins]    FAMILY HISTORY       Family History   Problem Relation Age of Onset    Diabetes Sister         passed away due to Diabetes    Heart Disease Brother     High Cholesterol Brother     High Blood Pressure Brother     High Blood Pressure Sister     High Blood Pressure Sister     High Blood Pressure Brother     High Cholesterol Brother     High Blood Pressure Brother     High Cholesterol Brother     Cancer Brother     High Blood Pressure Brother     High Cholesterol Brother     Cancer Brother     High Blood Pressure Brother     High Cholesterol Brother           SOCIAL HISTORY       Social History     Socioeconomic History    Marital status:      Spouse name: None    Number of children: None    Years of education: None    Highest education level: None   Tobacco Use    Smoking status: Former     Packs/day: 0.00     Years: 0.00     Pack years: 0.00     Types: Cigarettes    Smokeless tobacco: Never   Vaping Use    Vaping Use: Never used   Substance and Sexual Activity    Alcohol use: Not Currently    Drug use: Never       SCREENINGS    [place] Coma Scale  Eye Opening: Spontaneous  Best Verbal Response: Oriented  Best Motor Response: Obeys commands  [place] Coma Scale Score: 15        PHYSICAL EXAM    (up to 7 for level 4, 8 or more for level 5)     ED Triage Vitals [11/08/22 1231]   BP Temp Temp Source Heart Rate Resp SpO2 Height Weight   (!) 135/94 97.5 °F (36.4 °C) Oral (!) 139 20 94 % 5' 4\" (1.626 m) 81 lb (36.7 kg)       Physical Exam  Vitals and nursing note reviewed. Constitutional:       Appearance: She is well-developed.  She is not diaphoretic. Comments: Appears thin and frail but in no distress   HENT:      Head: Normocephalic and atraumatic. Nose: Nose normal.      Mouth/Throat:      Mouth: Mucous membranes are moist.   Eyes:      General:         Right eye: No discharge. Left eye: No discharge. Conjunctiva/sclera: Conjunctivae normal.      Pupils: Pupils are equal, round, and reactive to light. Cardiovascular:      Rate and Rhythm: Tachycardia present. Rhythm irregular. Heart sounds: Normal heart sounds. Pulmonary:      Effort: Pulmonary effort is normal. No respiratory distress. Breath sounds: Normal breath sounds. No wheezing or rales. Abdominal:      General: Bowel sounds are normal.      Palpations: Abdomen is soft. There is no mass. Tenderness: There is no abdominal tenderness. There is no guarding or rebound. Musculoskeletal:         General: No tenderness. Normal range of motion. Cervical back: Normal range of motion and neck supple. Skin:     General: Skin is warm and dry. Capillary Refill: Capillary refill takes less than 2 seconds. Neurological:      Mental Status: She is alert and oriented to person, place, and time. Psychiatric:         Behavior: Behavior normal.         Thought Content: Thought content normal.         Judgment: Judgment normal.       DIAGNOSTIC RESULTS     EKG: All EKG's areinterpreted by the Emergency Department Physician who either signs or Co-signs this chart in the absence of a cardiologist.  EKG shows atrial fibrillation with RVR rate of 149 left anterior fascicular block left ventricular hypertrophy anterior infarct old nonspecific T wave abnormalities      RADIOLOGY:  Non-plain film images such as CT, Ultrasound and MRI are read by the radiologist. Plain radiographic images are visualized and preliminarily interpreted bythe emergency physician with the below findings:          XR CHEST PORTABLE   Final Result   Right pleural effusion. Postsurgical changes of the thoracolumbar spine. Prosthetic cardiac valve. Recommendation: Follow up as clinically indicated.     Electronically Signed by Carolin Bundy MD at 08-Nov-2022 03:07:46 PM EST                       LABS:  Labs Reviewed   CBC WITH AUTO DIFFERENTIAL - Abnormal; Notable for the following components:       Result Value    RBC 3.71 (*)     Hematocrit 36.5 (*)     MCH 32.6 (*)     Neutrophils % 79.1 (*)     Lymphocytes % 13.5 (*)     Neutrophils Absolute 7.9 (*)     All other components within normal limits   COMPREHENSIVE METABOLIC PANEL - Abnormal; Notable for the following components:    Sodium 134 (*)     Potassium 3.1 (*)     Chloride 94 (*)     Glucose 123 (*)     All other components within normal limits   MAGNESIUM - Abnormal; Notable for the following components:    Magnesium 1.4 (*)     All other components within normal limits   LACTIC ACID - Abnormal; Notable for the following components:    Lactic Acid 2.6 (*)     All other components within normal limits    Narrative:     CALL  [facility] tel. ,  Chemistry results called to and read back by Nirali Calvin, 11/08/2022  13:50, by Nitza Alfaro   URINALYSIS WITH REFLEX TO CULTURE - Abnormal; Notable for the following components:    Ketones, Urine TRACE (*)     All other components within normal limits   TROPONIN - Abnormal; Notable for the following components:    Troponin 0.06 (*)     All other components within normal limits   CBC - Abnormal; Notable for the following components:    RBC 3.68 (*)     Hemoglobin 11.7 (*)     Hematocrit 35.9 (*)     MCH 31.8 (*)     MCHC 32.6 (*)     All other components within normal limits   LIPID PANEL - Abnormal; Notable for the following components:    Cholesterol, Total 228 (*)     All other components within normal limits   COMPREHENSIVE METABOLIC PANEL W/ REFLEX TO MG FOR LOW K - Abnormal; Notable for the following components:    Sodium 135 (*)     Creatinine 0.4 (*)     Calcium 8.7 (*) Total Protein 6.0 (*)     All other components within normal limits   TROPONIN - Abnormal; Notable for the following components:    Troponin 0.06 (*)     All other components within normal limits   POCT GLUCOSE - Abnormal; Notable for the following components:    POC Glucose 48 (*)     All other components within normal limits   POCT GLUCOSE - Abnormal; Notable for the following components:    POC Glucose 52 (*)     All other components within normal limits   COVID-19, RAPID   CULTURE, BLOOD 1   CULTURE, BLOOD 2   GASTROINTESTINAL PANEL, MOLECULAR   BRAIN NATRIURETIC PEPTIDE   TROPONIN   URINALYSIS WITH REFLEX TO CULTURE   BLOOD OCCULT STOOL SCREEN #1   ROTAVIRUS ANTIGEN, STOOL       All other labs were within normal range or not returned as of this dictation. EMERGENCY DEPARTMENT COURSE and DIFFERENTIAL DIAGNOSIS/MDM:   Vitals:    Vitals:    11/09/22 0213 11/09/22 0621 11/09/22 0744 11/09/22 1235   BP:   (!) 178/67 (!) 178/67   Pulse: 66  71 71   Resp:   20    Temp:   97.2 °F (36.2 °C)    TempSrc:   Temporal    SpO2:   100%    Weight:       Height:  5' 4\" (1.626 m)         MDM    Presented in rapid A. fib and chest pain had resolved prior to admission. Found to have a right-sided pleural effusion and ultimately converted with IV fluids and 10 mg of dill some diltiazem IV and hours in sinus rhythm. Blood pressure improved dramatically but still has a mildly elevated lactic acidosis and urine analysis is pending. Patient is comfortable currently in the ED. Admitted for further inpatient treatment and evaluation. Reassessment      CONSULTS:  IP CONSULT TO CARDIOLOGY  PALLIATIVE CARE EVAL    PROCEDURES:  Unless otherwise noted below, none     Procedures    FINAL IMPRESSION      1. Other chest pain    2. Atrial fibrillation with RVR ([address]    3. Diarrhea, unspecified type    4.  Pleural effusion on right          DISPOSITION/PLAN   DISPOSITION Admitted 11/08/2022 03:41:12 PM      PATIENT REFERRED TO:  No follow-up provider specified.     DISCHARGE MEDICATIONS:  Current Discharge Medication List             (Please note that portions of this note were completed with a voice recognition program.  Efforts were made to edit thedictations but occasionally words are mis-transcribed.)    Jelly Hill MD (electronically signed)  Attending Emergency Physician          Jelly Hill MD  11/09/22 [phone]

## 2022-11-08 NOTE — CARE COORDINATION
Patient Contact Information:    [address] [place] [phone] (home)   Telephone Information:   Mobile [phone]     Above information verified? [x]   Yes  []   No      Emergency Contacts:    Extended Emergency Contact Information  Primary Emergency Contact: Perfecto Soumya [facility] [address]  Phone: [phone]  Relation: Spouse  Secondary Emergency Contact: Bridget France   [address] Phone: [phone]  Mobile Phone: [phone]  Relation: Child      Have you been vaccinated for COVID-19 (SARS-CoV-2)? []   Yes  [x]   No                   If so, when? Which :         []   Pfizer-BioNTech  []   Moderna  []   Rolando Cheadle  []   Other:         Pharmacy:    [address] [facility], [address] [facility] [phone] [place] [phone]  [address] [facility] [address]  Phone: [phone] Fax: [address], [address] [address] [phone] [employer] [phone]  [address]  [address] [place] [address]  Phone: [phone] Fax: [phone]          Patient Deficits:    []   Yes   [x]   No    If yes:    []   Confusion/Memory  []   Visual  []   Motor/Sensory         []   Right arm         []   Right leg         []   Left arm         []   Left leg  []   Language/Speech         []   Aphasia         []   Dysarthria         []   Swallow         [place] Coma Scale  Eye Opening: Spontaneous  Best Verbal Response: Oriented  Best Motor Response: Obeys commands  [place] Coma Scale Score: 15    Patient Deficit Notes:   SW met with pt and familly at bedside.  Pt would like some assistance with a migraine medication       11/08/22 3322   Service Assessment   Patient Orientation Alert and Oriented   Cognition Alert   History Provided By Patient   Primary Caregiver Self   Accompanied By/Relationship Daughter x2 and son in law   [address] Members   Patient's [address] is: Legal Next of Marck 69  (Daughter Rayray Cuellar)   PCP Verified by CM Yes   Last Visit to PCP Within last 3 months   Prior Functional Level Independent in ADLs/IADLs   Current Functional Level Independent in ADLs/IADLs;Mobility   Can patient return to prior living arrangement Yes   Ability to make needs known: Good   Family able to assist with home care needs: Yes   Would you like for me to discuss the discharge plan with any other family members/significant others, and if so, who? Yes   Financial Resources [facility] Resources   (Denied Needs)   CM/SW Referral   (Denied Needs)   Social/Functional History   Lives With Daughter;Spouse   Type of [address] One level   Home Access Ramped entrance   [address] unit; Walk-in shower; Shower chair with back   Bathroom Toilet Handicap height   Bathroom Equipment Grab bars in shower   [address] Wheelchair-manual;Rollator;Walker, [address] Responsibilities Yes   Ambulation Assistance Independent   Transfer Assistance Independent   Active  No   Patient's  Info Family   Mode of Transportation Car   Occupation Retired   Discharge Planning   Type of [address] Prior To Admission None   Potential Assistance Needed N/A   DME Ordered?  No   Potential Assistance Purchasing Medications Yes  (MIgraine Medication is expensive [place])   Type of Home Care Services None   Patient expects to be discharged to: House   One/Two Story Residence One story   Services At/After Discharge   Transition of Care Consult (CM Consult) [address] Discharge None   Mode of Transport at Discharge Other (see comment)   Confirm Follow Up Transport Family

## 2022-11-08 NOTE — H&P
[place], [place], [address]    DEPARTMENT OF HOSPITALIST MEDICINE        HISTORY & PHYSICAL:          REASON FOR ADMISSION:  Chief Complaint   Patient presents with    Chest Pain     Pt arrives to ED via [address] with c/o chest pain. Pt states chest pain started this morning along with dizziness and nausea approx 0410. Pt took one nitro at home and four 81 mg aspirin with airevac. Pt states she had  heart attack in [facility] 2022    Hypotension     Per EMS pt was hypotensive. Hypotension resolved with 600ml fluid bolus. HISTORY OF PRESENT ILLNESS:  Linda Montanez is an 76 y.o. female with PMH of hypothyroid, lupus, MI in !/22 with stent placement, hs a fib with RVR, pallative care pt. Pt presented to OSH with chest pain and dyspnea and was flown here,. Had MI in Jan with 2 stents. SHe had A fib with RVR ,she coverted with cardizem bolus. She has been pain free since. She has had some diarrhea but none since arrival She is admitted to hospitalist and cardiology consulted.       PAST MEDICAL HISTORY:  Past Medical History:   Diagnosis Date    Dizziness     H/O thyroidectomy 2015    2 non-malginant masses    History of mitral valve repair     Hypothyroidism     Lupus ([address]     MI (myocardial infarction) ([address] 01/2022    Migraine     Myasthenia gravis ([facility]     Palliative care patient 01/10/2022    PONV (postoperative nausea and vomiting)     TIA (transient ischemic attack) 2006         PAST SURGICAL HISTORY:  Past Surgical History:   Procedure Laterality Date    BACK SURGERY      CARDIAC CATHETERIZATION  2006    MVP    CORONARY ARTERY BYPASS GRAFT  2006    HAND TENDON SURGERY Right 3/29/2019    MCP JOINT RIGHT HAND performed by Claudia Alfaro DO at [address]   2014    TONSILLECTOMY          SOCIAL HISTORY:  Social History     Socioeconomic History    Marital status:      Spouse name: None    Number of children: None    Years of education: None    Highest education level: None Tobacco Use    Smoking status: Former     Packs/day: 0.00     Years: 0.00     Pack years: 0.00     Types: Cigarettes    Smokeless tobacco: Never   Vaping Use    Vaping Use: Never used   Substance and Sexual Activity    Alcohol use: Not Currently    Drug use: Never        FAMILY HISTORY:  Family History   Problem Relation Age of Onset    Diabetes Sister         passed away due to Diabetes    Heart Disease Brother     High Cholesterol Brother     High Blood Pressure Brother     High Blood Pressure Sister     High Blood Pressure Sister     High Blood Pressure Brother     High Cholesterol Brother     High Blood Pressure Brother     High Cholesterol Brother     Cancer Brother     High Blood Pressure Brother     High Cholesterol Brother     Cancer Brother     High Blood Pressure Brother     High Cholesterol Brother          ALLERGIES:  Allergies   Allergen Reactions    Lisinopril Anaphylaxis     Tongue swelling    Cellcept [Mycophenolate]     Hydrochlorothiazide     Hydroxychloroquine Sulfate     Maxalt [Rizatriptan] Hives    Statins [Statins]         PRIOR TO ADMISSION MEDS:  Medications Prior to Admission: ticagrelor (BRILINTA) 90 MG TABS tablet, Take 1 tablet by mouth 2 times daily  metoprolol succinate (TOPROL XL) 25 MG extended release tablet, Take 0.5 tablets by mouth daily  aspirin 81 MG chewable tablet, Take 1 tablet by mouth daily  Vitamin D (CHOLECALCIFEROL) 50 MCG (2000 UT) TABS tablet, Take 1 tablet by mouth daily (Patient not taking: Reported on 11/8/2022)  SYNTHROID 25 MCG tablet, 100 mcg      REVIEW OF SYSTEMS:  Constitutional:  No fevers, chills, nausea, vomiting, + tiredness & fatigue   Head:  No head injury, facial trauma   Eyes:  No acute visual changes, exudate, trauma   Ears:  No acute hearing loss, earaches   Nose: No nasal discharge, epistaxis   Neck: No new hoarseness, voice change, or new masses   Lungs:   No hemoptysis, pleurisy   Heart:  No chest pressure with exertion, palpitations, Abdomen:   No new masses, no bright red blood per rectum   Extremities: No acute pain while ambulating, no new lesions   Skin: No new changes in skin color, no rashes or lesions   Neurologic: No new motor or sensory changes     14 point review of systems addressed with patient which is essentially negative except as specifically addressed above:    PHYSICAL EXAM:  BP (!) 177/75   Pulse 75   Temp 98.7 °F (37.1 °C) (Temporal)   Resp 18   Ht 5' 4\" (1.626 m)   Wt 74 lb 9.6 oz (33.8 kg)   SpO2 91%   BMI 12.81 kg/m²   I/O this shift:  In: 1000.7 [IV Piggyback:1000.7]  Out: -       PHYSICAL EXAMINATION:    Vital Signs: Please see the chart   MAGDALENO:  Awake, alert, oriented x 3, patient appears tired and fatigued   Head/Eyes:  Normocephalic, atraumatic, EOMI and PERRLA bilaterally   ENT: Moist mucous membranes, nasal passages clear   Neck: Supple, full range of motion, no carotid bruit, trachea midline   Respiratory:   Bilateral fair air entry in both lung fields, mild B/L crackles, symmetric expansion of chest   Cardiovascular:  Regular rate and rhythm, S1+S2+0, no murmurs/rubs   Urology: No bilateral CVA tenderness, no suprapubic tenderness   Abdomen:   Soft, non-tender, bowel sounds +ve, no organomegaly   Muscle/Joints: Moves all, full range of motion, no muscle spasms   Extremities: No clubbing, no cyanosis, no calf tenderness, no edema   Pulses: 2+ bilaterally, symmetrical   Skin: Warm, dry, no pallor/cyanosis/jaundice, no rashes/lesions   Neurologic: Awake, alert, oriented x 3, cranial nerves II-XII intact, no focal neurological deficits, sensory system intact   Psychiatric: Normal mood, non-suicidal         LABORATORY DATA:    CBC:  Recent Labs     11/08/22  1241   WBC 9.9   HGB 12.1   HCT 36.5*        BMP:  Recent Labs     11/08/22  1241   *   K 3.1*   CL 94*   CO2 28   BUN 14   CREATININE 0.6   CALCIUM 9.0     Recent Labs     11/08/22  1241   AST 27   ALT 15   BILITOT 0.6   ALKPHOS 90     Coag Panel: No results for input(s): INR, PROTIME, APTT in the last 72 hours. Cardiac Enzymes:   Recent Labs     11/08/22  1241   TROPONINI 0.02     ABGs:No results found for: PHART, PO2ART, GOC2BWS  Urinalysis:  Lab Results   Component Value Date/Time    NITRU Negative 11/08/2022 03:33 PM    BLOODU Negative 11/08/2022 03:33 PM    SPECGRAV 1.010 11/08/2022 03:33 PM    GLUCOSEU Negative 11/08/2022 03:33 PM     A1C: No results for input(s): LABA1C in the last 72 hours. ABG:No results for input(s): PHART, KHO3OKQ, PO2ART, JKN5KJK, BEART, HGBAE, T7CELDSG, CARBOXHGBART in the last 72 hours. EKG:   Please see chart      IMAGING:  XR CHEST PORTABLE    Result Date: 11/8/2022  Right pleural effusion. Postsurgical changes of the thoracolumbar spine. Prosthetic cardiac valve. Recommendation: Follow up as clinically indicated. Electronically Signed by Princess Markham MD at [id number] 03:07:46 PM EST                 Assessment and Plan: Active Problems:    Chest pain   Tele   Cardio consult   Troponin   Ekg with chest pain    History of mitral valve repair   Noted  Myasthenia gravis   Prednisone 10 mg daily  Hx A fib   Cont metoprolol  Hypothyroid   Cont synthroid       Lupus (systemic lupus erythematosus) ([facility]   noted    Palliative care patient   noted  Resolved Problems:    * No resolved hospital problems. *       Patient  is on DVT prophylaxis  Current medications reviewed  Lab work reviewed  Radiology/Chest x-ray films reviewed  Discussed with the nurse and addressed all questions/concerns  Discussed with Patient and/or Family at the bedside in detail . .. they verbalize understanding and agree with the management plan. Attestation:  Inpatient status is used for patients with an expected LOS extending past two midnights due to medical therapy and/or critical care needs  . .. all other patients are placed under OBServation status.       JEREMY Robertson CNP  5:39 PM 11/8/2022      DISCLAIMER: This note was created with electronic voice recognition which does have occasional errors. If you have any questions regarding the content within the note please do not hesitate to contact me. .. Thanks.

## 2022-11-09 LAB
ALBUMIN SERPL-MCNC: 3.5 G/DL (ref 3.5–5.2)
ALP BLD-CCNC: 83 U/L (ref 35–104)
ALT SERPL-CCNC: 13 U/L (ref 5–33)
ANION GAP SERPL CALCULATED.3IONS-SCNC: 9 MMOL/L (ref 7–19)
AST SERPL-CCNC: 26 U/L (ref 5–32)
BILIRUB SERPL-MCNC: 0.7 MG/DL (ref 0.2–1.2)
BUN BLDV-MCNC: 11 MG/DL (ref 8–23)
CALCIUM SERPL-MCNC: 8.7 MG/DL (ref 8.8–10.2)
CHLORIDE BLD-SCNC: 98 MMOL/L (ref 98–111)
CHOLESTEROL, TOTAL: 228 MG/DL (ref 160–199)
CO2: 28 MMOL/L (ref 22–29)
CREAT SERPL-MCNC: 0.4 MG/DL (ref 0.5–0.9)
D DIMER: 1.06 UG/ML FEU (ref 0–0.48)
GFR SERPL CREATININE-BSD FRML MDRD: >60 ML/MIN/{1.73_M2}
GLUCOSE BLD-MCNC: 104 MG/DL (ref 74–109)
GLUCOSE BLD-MCNC: 150 MG/DL (ref 70–99)
GLUCOSE BLD-MCNC: 48 MG/DL (ref 70–99)
GLUCOSE BLD-MCNC: 52 MG/DL (ref 70–99)
HCT VFR BLD CALC: 35.9 % (ref 37–47)
HDLC SERPL-MCNC: 76 MG/DL (ref 65–121)
HEMOGLOBIN: 11.7 G/DL (ref 12–16)
LDL CHOLESTEROL CALCULATED: 142 MG/DL
MCH RBC QN AUTO: 31.8 PG (ref 27–31)
MCHC RBC AUTO-ENTMCNC: 32.6 G/DL (ref 33–37)
MCV RBC AUTO: 97.6 FL (ref 81–99)
PDW BLD-RTO: 13.6 % (ref 11.5–14.5)
PERFORMED ON: ABNORMAL
PLATELET # BLD: 283 K/UL (ref 130–400)
PMV BLD AUTO: 9.4 FL (ref 9.4–12.3)
POTASSIUM REFLEX MAGNESIUM: 4.2 MMOL/L (ref 3.5–5)
RBC # BLD: 3.68 M/UL (ref 4.2–5.4)
SODIUM BLD-SCNC: 135 MMOL/L (ref 136–145)
TOTAL PROTEIN: 6 G/DL (ref 6.6–8.7)
TRIGL SERPL-MCNC: 48 MG/DL (ref 0–149)
TROPONIN: 0.06 NG/ML (ref 0–0.03)
WBC # BLD: 6.9 K/UL (ref 4.8–10.8)

## 2022-11-09 PROCEDURE — 82947 ASSAY GLUCOSE BLOOD QUANT: CPT

## 2022-11-09 PROCEDURE — 80061 LIPID PANEL: CPT

## 2022-11-09 PROCEDURE — 6370000000 HC RX 637 (ALT 250 FOR IP): Performed by: NURSE PRACTITIONER

## 2022-11-09 PROCEDURE — 80053 COMPREHEN METABOLIC PANEL: CPT

## 2022-11-09 PROCEDURE — 99223 1ST HOSP IP/OBS HIGH 75: CPT | Performed by: INTERNAL MEDICINE

## 2022-11-09 PROCEDURE — 36415 COLL VENOUS BLD VENIPUNCTURE: CPT

## 2022-11-09 PROCEDURE — 2580000003 HC RX 258: Performed by: NURSE PRACTITIONER

## 2022-11-09 PROCEDURE — 85027 COMPLETE CBC AUTOMATED: CPT

## 2022-11-09 PROCEDURE — 84484 ASSAY OF TROPONIN QUANT: CPT

## 2022-11-09 PROCEDURE — 85379 FIBRIN DEGRADATION QUANT: CPT

## 2022-11-09 PROCEDURE — 1210000000 HC MED SURG R&B

## 2022-11-09 RX ORDER — DEXTROSE, SODIUM CHLORIDE, SODIUM LACTATE, POTASSIUM CHLORIDE, AND CALCIUM CHLORIDE 5; .6; .31; .03; .02 G/100ML; G/100ML; G/100ML; G/100ML; G/100ML
INJECTION, SOLUTION INTRAVENOUS CONTINUOUS
Status: DISCONTINUED | OUTPATIENT
Start: 2022-11-09 | End: 2022-11-10 | Stop reason: HOSPADM

## 2022-11-09 RX ORDER — DEXTROSE MONOHYDRATE 100 MG/ML
INJECTION, SOLUTION INTRAVENOUS CONTINUOUS PRN
Status: DISCONTINUED | OUTPATIENT
Start: 2022-11-09 | End: 2022-11-10 | Stop reason: HOSPADM

## 2022-11-09 RX ADMIN — DEXTROSE MONOHYDRATE 125 ML: 10 INJECTION, SOLUTION INTRAVENOUS at 13:07

## 2022-11-09 RX ADMIN — METOPROLOL SUCCINATE 12.5 MG: 25 TABLET, EXTENDED RELEASE ORAL at 12:35

## 2022-11-09 RX ADMIN — SODIUM CHLORIDE, SODIUM LACTATE, POTASSIUM CHLORIDE, CALCIUM CHLORIDE AND DEXTROSE MONOHYDRATE: 5; 600; 310; 30; 20 INJECTION, SOLUTION INTRAVENOUS at 13:28

## 2022-11-09 RX ADMIN — PREDNISONE 10 MG: 10 TABLET ORAL at 12:35

## 2022-11-09 RX ADMIN — TICAGRELOR 90 MG: 90 TABLET ORAL at 12:35

## 2022-11-09 RX ADMIN — SODIUM CHLORIDE, PRESERVATIVE FREE 10 ML: 5 INJECTION INTRAVENOUS at 12:44

## 2022-11-09 RX ADMIN — SODIUM CHLORIDE, SODIUM LACTATE, POTASSIUM CHLORIDE, CALCIUM CHLORIDE AND DEXTROSE MONOHYDRATE: 5; 600; 310; 30; 20 INJECTION, SOLUTION INTRAVENOUS at 23:32

## 2022-11-09 RX ADMIN — LEVOTHYROXINE SODIUM 100 MCG: 100 TABLET ORAL at 06:03

## 2022-11-09 RX ADMIN — TICAGRELOR 90 MG: 90 TABLET ORAL at 20:01

## 2022-11-09 RX ADMIN — ASPIRIN 81 MG 81 MG: 81 TABLET ORAL at 12:35

## 2022-11-09 ASSESSMENT — ENCOUNTER SYMPTOMS
SHORTNESS OF BREATH: 0
CHEST TIGHTNESS: 0
DIARRHEA: 0
CONSTIPATION: 0
RESPIRATORY NEGATIVE: 1
GASTROINTESTINAL NEGATIVE: 1
TROUBLE SWALLOWING: 0
RECTAL PAIN: 0
SORE THROAT: 0
ABDOMINAL PAIN: 0
EYES NEGATIVE: 1
NAUSEA: 0
VOMITING: 0
BACK PAIN: 0
COLOR CHANGE: 0

## 2022-11-09 NOTE — PROGRESS NOTES
Notified Dr. Greg Mosquera of patients increased troponin level. Acknowledged by Dr. Greg Mosquera.  Electronically signed by Ngoc Kilpatrick RN on 11/8/2022 at 7:56 PM

## 2022-11-09 NOTE — PROGRESS NOTES
[id number] [facility]      Patient:  Torri Vidal  YOB: 1945  Date of Service: 11/9/2022  MRN: [id number]   Acct: [de-identified]   Primary Care Physician: Rohith Carreno MD  Advance Directive: Full Code  Admit Date: 11/8/2022       Hospital Day: 1  Portions of this note have been copied forward, however, changed to reflect the most current clinical status of this patient. CHIEF COMPLAINT chest pain    SUBJECTIVE: Patient denies no further chest pain since admission. Patient denies shortness of breath and palpitations. Patient denies nausea, vomiting, and diarrhea overnight. CUMULATIVE HOSPITAL COURSE:  The patient is a 76-year-old female with a past medical history of hypothyroidism, lupus, myasthenia gravis, atrial fibrillation, and MI in early 2022 requiring stent placement who presented [facility] ED via air [facility] with complaints of chest pain and dyspnea. Patient reports MI in January requiring 2 stents. She reports taking Brilinta twice a day since then. Patient reported having nausea associated with her chest pain and took nitro at home. Patient reports pain was relieved with nitroglycerin. In route patient was noted to be hypotensive with and received fluid bolus. Patient was also found to be A. fib RVR and was given Cardizem bolus which converted her back to sinus rhythm. Patient was admitted to the hospitalist service with cardiology consult for further evaluation. Troponin was trended 0.02, 0.06, and 0.06. Patient was evaluated by cardiology and discussed further testing, Amada Hernandez planned for the a.m. Patient noted to have hypoglycemia while n.p.o. and placed on D5 fluids we will continue monitoring glucose. Review of Systems:   Review of Systems   Constitutional:  Negative for appetite change, chills and fatigue. HENT:  Negative for sore throat and trouble swallowing. Respiratory:  Negative for chest tightness and shortness of breath.     Cardiovascular:  Negative for chest pain, palpitations and leg swelling. Gastrointestinal:  Negative for abdominal pain, constipation, diarrhea, nausea, rectal pain and vomiting. Genitourinary:  Negative for difficulty urinating, frequency and urgency. Musculoskeletal:  Negative for back pain and neck pain. Skin:  Negative for color change and wound. Neurological:  Negative for dizziness, tremors and headaches. Psychiatric/Behavioral:  Negative for agitation and confusion. 14 point review of systems is negative except as specifically addressed above. Objective:   VITALS:  BP (!) 178/67   Pulse 71   Temp 97.2 °F (36.2 °C) (Temporal)   Resp 20   Ht 5' 4\" (1.626 m)   Wt 74 lb 9.6 oz (33.8 kg)   SpO2 100%   BMI 12.81 kg/m²   24HR INTAKE/OUTPUT:    Intake/Output Summary (Last 24 hours) at 11/9/2022 1459  Last data filed at 11/9/2022 0835  Gross per 24 hour   Intake 1000.71 ml   Output --   Net 1000.71 ml       Physical Exam  Vitals reviewed. Constitutional:       Comments: Very thin and frail   HENT:      Mouth/Throat:      Mouth: Mucous membranes are dry. Pharynx: Oropharynx is clear. Eyes:      Pupils: Pupils are equal, round, and reactive to light. Cardiovascular:      Rate and Rhythm: Normal rate and regular rhythm. Pulses: Normal pulses. Heart sounds: Normal heart sounds. Pulmonary:      Effort: Pulmonary effort is normal.      Breath sounds: Normal breath sounds. Abdominal:      General: Abdomen is flat. Bowel sounds are normal. There is no distension. Palpations: Abdomen is soft. Tenderness: There is no abdominal tenderness. There is no guarding. Musculoskeletal:         General: Normal range of motion. Right lower leg: No edema. Left lower leg: No edema. Skin:     General: Skin is warm and dry. Capillary Refill: Capillary refill takes less than 2 seconds. Neurological:      General: No focal deficit present.       Mental Status: She is alert and oriented to person, place, and time. Medications:      dextrose      dextrose 5% in lactated ringers 100 mL/hr at 11/09/22 1328    sodium chloride        aspirin  81 mg Oral Daily    [Held by provider] metoprolol succinate  12.5 mg Oral Daily    levothyroxine  100 mcg Oral Daily    ticagrelor  90 mg Oral BID    sodium chloride flush  5-40 mL IntraVENous 2 times per day    predniSONE  10 mg Oral Daily     glucose, dextrose bolus **OR** dextrose bolus, glucagon (rDNA), dextrose, sodium chloride flush, sodium chloride, ondansetron **OR** ondansetron, acetaminophen **OR** acetaminophen, polyethylene glycol, magnesium sulfate, potassium chloride **OR** potassium alternative oral replacement **OR** potassium chloride  Diet NPO  ADULT DIET; Regular; Low Fat/Low Chol/High Fiber/2 gm Na; No Caffeine     Lab and other Data:     Recent Labs     11/08/22  1241 11/09/22  0325   WBC 9.9 6.9   HGB 12.1 11.7*    283     Recent Labs     11/08/22  1241 11/09/22  0325   * 135*   K 3.1* 4.2   CL 94* 98   CO2 28 28   BUN 14 11   CREATININE 0.6 0.4*   GLUCOSE 123* 104     Recent Labs     11/08/22  1241 11/09/22  0325   AST 27 26   ALT 15 13   BILITOT 0.6 0.7   ALKPHOS 90 83     Troponin T:   Recent Labs     11/08/22  1241 11/08/22  1751 11/09/22  0853   TROPONINI 0.02 0.06* 0.06*     Pro-BNP: No results for input(s): BNP in the last 72 hours. INR: No results for input(s): INR in the last 72 hours. UA:  Recent Labs     11/08/22  1533   COLORU YELLOW   PHUR 6.5   CLARITYU Clear   SPECGRAV 1.010   LEUKOCYTESUR Negative   UROBILINOGEN 0.2   BILIRUBINUR Negative   BLOODU Negative   GLUCOSEU Negative     A1C: No results for input(s): LABA1C in the last 72 hours. ABG:No results for input(s): PHART, GIF7ZAC, PO2ART, GNT2VQX, BEART, HGBAE, D1KNGMVT, CARBOXHGBART in the last 72 hours. RAD:   XR CHEST PORTABLE    Result Date: 11/8/2022  Right pleural effusion. Postsurgical changes of the thoracolumbar spine. Prosthetic cardiac valve. Recommendation: Follow up as clinically indicated. Electronically Signed by Mirna Orona MD at 08-Nov-2022 03:07:46 PM EST                  Assessment/Plan   Principal Problem:    Chest pain / s/p stent   -cardiology on board   -NPO after MN for Lexiscan   -troponin 0.02, 0.06, 0.06   -monitor on tele, Zio patch at discharge   -d.  Dimer   -continue Brilinta   -hold metoprolol for Lexiscan    Active Problems:  Hypoglycemia   -Monitor Accu-Cheks while n.p.o.   -Hypoglycemia protocol in place   -D5 LR while n.p.o.      Myasthenia gravis ([address]   -noted      History of mitral valve repair   -noted      Depression   -noted      Severe malnutrition ([address]   -dietician consult      Lupus (systemic lupus erythematosus) ([facility] [address]   -continue home prednisone      DVT Prophylaxis:JEREMY Fernandes - CNP, 11/9/2022 2:59 PM

## 2022-11-09 NOTE — PLAN OF CARE
Problem: Nutrition Deficit:  Goal: Optimize nutritional status  Outcome: Not Progressing   Nutrition Problem #1: Severe malnutrition  Intervention: Food and/or Nutrient Delivery: Continue NPO

## 2022-11-09 NOTE — PROGRESS NOTES
Nutrition Assessment     Type and Reason for Visit: Initial, Positive Nutrition Screen    Nutrition Recommendations/Plan:   Await diet advancement. Malnutrition Assessment:  Malnutrition Status: Severe malnutrition    Nutrition Assessment:  +NS for wt loss and decreased appetite. Pt with existing dx severe malnutrition. Pt at risk for further nutritional compromise r/t NPO status. Will follow for diet advancement and implement intervention as appropriate. Estimated Daily Nutrient Needs:  Energy (kcal):  6071-6452 Weight Used for Energy Requirements: Current     Protein (g):  51-68 Weight Used for Protein Requirements: Current        Fluid (ml/day):  9478-5289 Method Used for Fluid Requirements: 1 ml/kcal    Nutrition Related Findings:     Wound Type: None    Current Nutrition Therapies:    Diet NPO    Anthropometric Measures:  Height: 5' 4\" (162.6 cm)  Current Body Wt: 74 lb 9.6 oz (33.8 kg)   BMI: 12.8    Nutrition Diagnosis:   Severe malnutrition related to inadequate protein-energy intake as evidenced by poor intake prior to admission, severe muscle loss, severe loss of subcutaneous fat    Nutrition Interventions:   Food and/or Nutrient Delivery: Continue NPO  Nutrition Education/Counseling: No recommendation at this time  Coordination of Nutrition Care: Continue to monitor while inpatient       Goals:     Goals: PO intake 50% or greater       Nutrition Monitoring and Evaluation:   Behavioral-Environmental Outcomes: None Identified  Food/Nutrient Intake Outcomes: Food and Nutrient Intake, Diet Advancement/Tolerance  Physical Signs/Symptoms Outcomes: Biochemical Data, Weight, Nutrition Focused Physical Findings    Discharge Planning:     Too soon to determine     Anaid Gurrola, [address],   Contact: [phone]

## 2022-11-09 NOTE — ACP (ADVANCE CARE PLANNING)
Advance Care Planning     Advance Care Planning Activator (Inpatient)  Conversation Note      Date of ACP Conversation: 11/9/2022     Conversation Conducted with: Pt: Lynn Khan    ACP Activator: Cristi Kolb RN    Health Care Decision Maker:     Current Designated Health Care Decision Maker:     Primary Decision Maker: Francisco J Ghotra [facility] - [phone]  Care Preferences    Ventilation: \"If you were in your present state of health and suddenly became very ill and were unable to breathe on your own, what would your preference be about the use of a ventilator (breathing machine) if it were available to you? \"      Would the patient desire the use of ventilator (breathing machine)?:    YES        Resuscitation  \"CPR works best to restart the heart when there is a sudden event, like a heart attack, in someone who is otherwise healthy. Unfortunately, CPR does not typically restart the heart for people who have serious health conditions or who are very sick. \"    \"In the event your heart stopped as a result of an underlying serious health condition, would you want attempts to be made to restart your heart (answer \"yes\" for attempt to resuscitate) or would you prefer a natural death (answer \"no\" for do not attempt to resuscitate)? \"     YES    Conversation Outcomes:  [x] ACP discussion completed  [x] Existing advance directive reviewed with patient.

## 2022-11-09 NOTE — CONSULTS
[facility]  Cardiology Consult      Requesting MD:  Fadi Arriaza MD   Admit Status:         History obtained from:   [] Patient  [] Other (specify):     Patient:  Lisy Browning  [id number]     Chief Complaint:   Chief Complaint   Patient presents with    Chest Pain     Pt arrives to ED via [address] with c/o chest pain. Pt states chest pain started this morning along with dizziness and nausea approx 0410. Pt took one nitro at home and four 81 mg aspirin with airevac. Pt states she had  heart attack in unary 2022    Hypotension     Per EMS pt was hypotensive. Hypotension resolved with 600ml fluid bolus. HPI: Ms. Mina Sidhu is a 76 y.o. female with a history of mitral valve replacement 2006 recent cardiac catheterization January 2022 with stent placement x2 proximal PDA and proximal right coronary artery in her usual state of health until last evening complained of headache nausea jaw pain and later chest discomfort stabbing pain at times aching pain at times lasted about half hour took 3 nitroglycerin which seemed to help. She is also had atrial fibrillation earlier in the year following her stent procedure and continues to complain of intermittent episodes of dizziness with associated palpitations maybe once per week no recent cardiac monitoring extended has been performed. Since admission first troponin -2 troponins thereafter were 0.06 and 0.06.  proBNP 450. Review of Systems:  Review of Systems   Constitutional: Negative. Negative for chills, fever and unexpected weight change. HENT: Negative. Eyes: Negative. Respiratory: Negative. Negative for shortness of breath. Cardiovascular: Negative. Negative for chest pain. Gastrointestinal: Negative. Negative for diarrhea, nausea and vomiting. Endocrine: Negative. Genitourinary: Negative. Musculoskeletal: Negative. Skin: Negative. Neurological: Negative.     All other systems reviewed and are negative.     Cardiac Specific Data:  Specialty Problems          Cardiology Problems    Symptomatic bradycardia        Mitral valve prolapse        Chest pain due to coronary artery disease (HCC)        Chest pain        NSTEMI (non-ST elevation myocardial infarction) [facility])           Past Medical History:  Past Medical History:   Diagnosis Date    Dizziness     H/O thyroidectomy 2015    2 non-malginant masses    History of mitral valve repair     Hypothyroidism     Lupus ([facility] [address]     MI (myocardial infarction) ([facility] [address] 01/2022    Migraine     Myasthenia gravis ([facility]     Palliative care patient 01/10/2022    PONV (postoperative nausea and vomiting)     TIA (transient ischemic attack) 2006        Past Surgical History:  Past Surgical History:   Procedure Laterality Date    BACK SURGERY      CARDIAC CATHETERIZATION  2006    MVP    CORONARY ARTERY BYPASS GRAFT  2006    HAND TENDON SURGERY Right 3/29/2019    MCP JOINT RIGHT HAND performed by Shannon Stark DO at [address]  2014    TONSILLECTOMY         Past Family History:  Family History   Problem Relation Age of Onset    Diabetes Sister         passed away due to Diabetes    Heart Disease Brother     High Cholesterol Brother     High Blood Pressure Brother     High Blood Pressure Sister     High Blood Pressure Sister     High Blood Pressure Brother     High Cholesterol Brother     High Blood Pressure Brother     High Cholesterol Brother     Cancer Brother     High Blood Pressure Brother     High Cholesterol Brother     Cancer Brother     High Blood Pressure Brother     High Cholesterol Brother        Past Social History:  Social History     Socioeconomic History    Marital status:      Spouse name: Not on file    Number of children: Not on file    Years of education: Not on file    Highest education level: Not on file   Occupational History    Not on file   Tobacco Use    Smoking status: Former     Packs/day: 0.00     Years: 0.00     Pack years: 0.00 Types: Cigarettes    Smokeless tobacco: Never   Vaping Use    Vaping Use: Never used   Substance and Sexual Activity    Alcohol use: Not Currently    Drug use: Never    Sexual activity: Not on file   Other Topics Concern    Not on file   Social History Narrative    Not on file     Social Determinants of Health     Financial Resource Strain: Not on file   Food Insecurity: Not on file   Transportation Needs: Not on file   Physical Activity: Not on file   Stress: Not on file   Social Connections: Not on file   Intimate Partner Violence: Not on file   Housing Stability: Not on file       Allergies: Allergies   Allergen Reactions    Lisinopril Anaphylaxis     Tongue swelling    Cellcept [Mycophenolate]     Hydrochlorothiazide     Hydroxychloroquine Sulfate     Maxalt [Rizatriptan] Hives    Statins [Statins]        Home Meds:  Prior to Admission medications    Medication Sig Start Date End Date Taking?  Authorizing Provider   predniSONE (DELTASONE) 10 MG tablet Take 10 mg by mouth daily   Yes Historical Provider, MD   ticagrelor (BRILINTA) 90 MG TABS tablet Take 1 tablet by mouth 2 times daily 5/13/22   JEREMY Mccarty   metoprolol succinate (TOPROL XL) 25 MG extended release tablet Take 0.5 tablets by mouth daily 2/8/22   JEREMY Marshall   aspirin 81 MG chewable tablet Take 1 tablet by mouth daily 1/12/22   Lidia Bajwa MD   Vitamin D (CHOLECALCIFEROL) 50 MCG (2000 UT) TABS tablet Take 1 tablet by mouth daily  Patient not taking: Reported on 11/8/2022 1/12/22   Lidia Bajwa MD   SYNTHROID 25 MCG tablet 100 mcg  7/29/20   Historical Provider, MD       Current Meds:   aspirin  81 mg Oral Daily    [Held by provider] metoprolol succinate  12.5 mg Oral Daily    levothyroxine  100 mcg Oral Daily    ticagrelor  90 mg Oral BID    sodium chloride flush  5-40 mL IntraVENous 2 times per day    predniSONE  10 mg Oral Daily       Current Infused Meds:   dextrose      dextrose 5% in lactated ringers 100 mL/hr at 11/09/22 1328    sodium chloride         Physical Exam:  Vitals:    11/09/22 1235   BP: (!) 178/67   Pulse: 71   Resp:    Temp:    SpO2:        Intake/Output Summary (Last 24 hours) at 11/9/2022 1445  Last data filed at 11/9/2022 9382  Gross per 24 hour   Intake 1000.71 ml   Output --   Net 1000.71 ml     Estimated body mass index is 12.81 kg/m² as calculated from the following:    Height as of this encounter: 5' 4\" (1.626 m). Weight as of this encounter: 74 lb 9.6 oz (33.8 kg). Physical Exam  Vitals reviewed. Constitutional:       General: She is not in acute distress. Appearance: Normal appearance. She is well-developed and normal weight. She is not ill-appearing, toxic-appearing or diaphoretic. HENT:      Head: Normocephalic and atraumatic. Nose: Nose normal.      Mouth/Throat:      Mouth: Mucous membranes are moist.   Eyes:      General: No scleral icterus. Extraocular Movements: Extraocular movements intact. Pupils: Pupils are equal, round, and reactive to light. Neck:      Vascular: No carotid bruit or JVD. Cardiovascular:      Rate and Rhythm: Normal rate and regular rhythm. Heart sounds: Normal heart sounds. No murmur heard. No friction rub. No gallop. Pulmonary:      Effort: Pulmonary effort is normal. No respiratory distress. Breath sounds: No stridor. No wheezing, rhonchi or rales. Chest:      Chest wall: No tenderness. Abdominal:      General: Abdomen is flat. Bowel sounds are normal. There is no distension. Palpations: Abdomen is soft. There is no mass. Tenderness: There is no abdominal tenderness. There is no right CVA tenderness, left CVA tenderness, guarding or rebound. Hernia: No hernia is present. Musculoskeletal:         General: No swelling, tenderness, deformity or signs of injury. Cervical back: Normal range of motion and neck supple. No rigidity or tenderness. Right lower leg: No edema.       Left lower leg: No edema.   Lymphadenopathy:      Cervical: No cervical adenopathy. Skin:     General: Skin is warm and dry. Neurological:      General: No focal deficit present. Mental Status: She is alert and oriented to person, place, and time. Mental status is at baseline. Cranial Nerves: No cranial nerve deficit. Sensory: No sensory deficit. Motor: No weakness. Coordination: Coordination normal.   Psychiatric:         Mood and Affect: Mood normal.         Behavior: Behavior normal.         Thought Content: Thought content normal.         Judgment: Judgment normal.       Labs:  Recent Labs     11/08/22  1241 11/09/22  0325   WBC 9.9 6.9   HGB 12.1 11.7*    283       Recent Labs     11/08/22  1241 11/09/22  0325   * 135*   K 3.1* 4.2   CL 94* 98   CO2 28 28   BUN 14 11   CREATININE 0.6 0.4*   LABGLOM >60 >60   MG 1.4*  --    CALCIUM 9.0 8.7*       CK, CKMB, Troponin: @LABRCNT (CKTOTAL:3, CKMB:3, TROPONINI:3)@    Last 3 BNP:  No results for input(s): BNP in the last 72 hours. IMAGING:  XR CHEST PORTABLE    Result Date: 11/8/2022  NO PRIOR REPORT AVAILABLE Exam: X-RAY OF [facility] Clinical data:Chest pain. Technique:Single view of the chest. Prior studies: Radiographs of the chest dated 01/08/2022 report. Findings: Crawford rods traverse the thoracolumbar spine. Cage devices overlie the lower thoracic and upper lumbar spine. Aorta is calcified and tortuous. The heart is enlarged. Curvilinear density overlies the projection of the heart suggestive of a prostatic cardiac valve. There is a right pleural effusion. Right pleural effusion. Postsurgical changes of the thoracolumbar spine. Prosthetic cardiac valve. Recommendation: Follow up as clinically indicated.  Electronically Signed by Kirit Kay MD at 08-Nov-2022 03:07:46 PM EST               Assessment:  Onset yesterday headache nausea jaw pain and chest pain relieved with nitroglycerin troponin -2 troponins 0.06 and 0.06 unstable angina not excluded  Coronary artery disease  Myasthenia gravis  History of mitral valve replacement 2006  Depression  Malnutrition  Lupus  Bradycardia  Cardiac catheterization 1/9/2022 stent placement x2 proximal right coronary artery and proximal PDA  Echocardiogram 1/8/2022 mild MR previous mitral valve repair mean gradient 3 mild AR trivial TR LV ejection fraction normal 66% mild concentric LVH dilated inferior vena cava  History of paroxysmal atrial fibrillation with recurrent episodes of dizziness and palpitations occurring approximately once a week recently. Recommendations:  Discussed various options with the patient including cardiac catheterization and possible stress test after discussion she would prefer stress test we will arrange Freya Nascimento in the a.m. Further comments to follow thereafter.   Telemetry monitoring would benefit from a Zio patch upon discharge

## 2022-11-09 NOTE — CONSULTS
Palliative Care:    Known to Palliative Care.    77 y/o lady  who presents with chest pain. Hx of MI with heart cath in January and stents x2. Cardiology consulted and arrives to assess pt. Plan for Jacob De Anda in the am.  Pt has 2 sons present who live [de-identified] and has a daughter who is near her. Pt says her  has Parkinson's and she is his caregiver. Past Medical History:        Past Medical History:   Diagnosis Date    Dizziness     H/O thyroidectomy 2015    2 non-malginant masses    History of mitral valve repair     Hypothyroidism     Lupus (HCC)     MI (myocardial infarction) ([facility] [address] 01/2022    Migraine     Myasthenia gravis ([facility] [address]     Palliative care patient 01/10/2022    PONV (postoperative nausea and vomiting)     TIA (transient ischemic attack) 2006       Advance Directives:   Confirm full code status. Pt has an EMS DNR on file, but she rescinds this and elects resuscitation. She voiced wishes for no prolonged ventilation or life support. Her daughter Joi Morales is her primary decision maker. ACP completed. Patient/family discussion r/t goals:  Pt's goal is to return home. We did talk about potential need for some rehab or home health care and she voices understanding. Review of any needed services:  TBD    Provided encouragement and support.   Will follow POC        Electronically signed by Kaz Melgar RN on 11/9/2022 at 3:21 PM

## 2022-11-10 ENCOUNTER — APPOINTMENT (OUTPATIENT)
Dept: NUCLEAR MEDICINE | Age: 77
DRG: 302 | End: 2022-11-10
Payer: MEDICARE

## 2022-11-10 VITALS
BODY MASS INDEX: 12.74 KG/M2 | WEIGHT: 74.6 LBS | DIASTOLIC BLOOD PRESSURE: 73 MMHG | RESPIRATION RATE: 20 BRPM | HEIGHT: 64 IN | SYSTOLIC BLOOD PRESSURE: 125 MMHG | OXYGEN SATURATION: 100 % | HEART RATE: 65 BPM | TEMPERATURE: 97.5 F

## 2022-11-10 LAB
ALBUMIN SERPL-MCNC: 3.3 G/DL (ref 3.5–5.2)
ALP BLD-CCNC: 75 U/L (ref 35–104)
ALT SERPL-CCNC: 11 U/L (ref 5–33)
ANION GAP SERPL CALCULATED.3IONS-SCNC: 9 MMOL/L (ref 7–19)
AST SERPL-CCNC: 21 U/L (ref 5–32)
BILIRUB SERPL-MCNC: 0.8 MG/DL (ref 0.2–1.2)
BUN BLDV-MCNC: 13 MG/DL (ref 8–23)
CALCIUM SERPL-MCNC: 9.1 MG/DL (ref 8.8–10.2)
CHLORIDE BLD-SCNC: 98 MMOL/L (ref 98–111)
CO2: 26 MMOL/L (ref 22–29)
CREAT SERPL-MCNC: 0.4 MG/DL (ref 0.5–0.9)
GFR SERPL CREATININE-BSD FRML MDRD: >60 ML/MIN/{1.73_M2}
GLUCOSE BLD-MCNC: 163 MG/DL (ref 74–109)
HCT VFR BLD CALC: 33.2 % (ref 37–47)
HEMOGLOBIN: 11 G/DL (ref 12–16)
MCH RBC QN AUTO: 32.3 PG (ref 27–31)
MCHC RBC AUTO-ENTMCNC: 33.1 G/DL (ref 33–37)
MCV RBC AUTO: 97.4 FL (ref 81–99)
PDW BLD-RTO: 13.5 % (ref 11.5–14.5)
PLATELET # BLD: 276 K/UL (ref 130–400)
PMV BLD AUTO: 9.5 FL (ref 9.4–12.3)
POTASSIUM REFLEX MAGNESIUM: 3.9 MMOL/L (ref 3.5–5)
RBC # BLD: 3.41 M/UL (ref 4.2–5.4)
SODIUM BLD-SCNC: 133 MMOL/L (ref 136–145)
TOTAL PROTEIN: 5.3 G/DL (ref 6.6–8.7)
WBC # BLD: 7.8 K/UL (ref 4.8–10.8)

## 2022-11-10 PROCEDURE — 93016 CV STRESS TEST SUPVJ ONLY: CPT | Performed by: INTERNAL MEDICINE

## 2022-11-10 PROCEDURE — 2580000003 HC RX 258: Performed by: NURSE PRACTITIONER

## 2022-11-10 PROCEDURE — 80053 COMPREHEN METABOLIC PANEL: CPT

## 2022-11-10 PROCEDURE — 6370000000 HC RX 637 (ALT 250 FOR IP): Performed by: NURSE PRACTITIONER

## 2022-11-10 PROCEDURE — 3430000000 HC RX DIAGNOSTIC RADIOPHARMACEUTICAL: Performed by: INTERNAL MEDICINE

## 2022-11-10 PROCEDURE — 85027 COMPLETE CBC AUTOMATED: CPT

## 2022-11-10 PROCEDURE — 6360000002 HC RX W HCPCS: Performed by: INTERNAL MEDICINE

## 2022-11-10 PROCEDURE — 93017 CV STRESS TEST TRACING ONLY: CPT

## 2022-11-10 PROCEDURE — 36415 COLL VENOUS BLD VENIPUNCTURE: CPT

## 2022-11-10 PROCEDURE — 93018 CV STRESS TEST I&R ONLY: CPT | Performed by: INTERNAL MEDICINE

## 2022-11-10 PROCEDURE — A9502 TC99M TETROFOSMIN: HCPCS | Performed by: INTERNAL MEDICINE

## 2022-11-10 PROCEDURE — 93246 EXT ECG>7D<15D RECORDING: CPT

## 2022-11-10 RX ADMIN — ASPIRIN 81 MG 81 MG: 81 TABLET ORAL at 10:08

## 2022-11-10 RX ADMIN — TETROFOSMIN 8 MILLICURIE: 1.38 INJECTION, POWDER, LYOPHILIZED, FOR SOLUTION INTRAVENOUS at 07:00

## 2022-11-10 RX ADMIN — TICAGRELOR 90 MG: 90 TABLET ORAL at 10:08

## 2022-11-10 RX ADMIN — SODIUM CHLORIDE, SODIUM LACTATE, POTASSIUM CHLORIDE, CALCIUM CHLORIDE AND DEXTROSE MONOHYDRATE: 5; 600; 310; 30; 20 INJECTION, SOLUTION INTRAVENOUS at 11:21

## 2022-11-10 RX ADMIN — LEVOTHYROXINE SODIUM 100 MCG: 100 TABLET ORAL at 10:08

## 2022-11-10 RX ADMIN — REGADENOSON 0.4 MG: 0.08 INJECTION, SOLUTION INTRAVENOUS at 08:30

## 2022-11-10 RX ADMIN — PREDNISONE 10 MG: 10 TABLET ORAL at 10:08

## 2022-11-10 RX ADMIN — SODIUM CHLORIDE, PRESERVATIVE FREE 10 ML: 5 INJECTION INTRAVENOUS at 10:09

## 2022-11-10 RX ADMIN — TETROFOSMIN 24 MILLICURIE: 1.38 INJECTION, POWDER, LYOPHILIZED, FOR SOLUTION INTRAVENOUS at 09:00

## 2022-11-10 ASSESSMENT — ENCOUNTER SYMPTOMS
DIARRHEA: 0
BACK PAIN: 0
SORE THROAT: 0
CHEST TIGHTNESS: 0
VOMITING: 0
ABDOMINAL PAIN: 0
NAUSEA: 0
COLOR CHANGE: 0
TROUBLE SWALLOWING: 0
RECTAL PAIN: 0
CONSTIPATION: 0
SHORTNESS OF BREATH: 0

## 2022-11-10 NOTE — PROGRESS NOTES
[facility]      Patient:  Emir Hilario  YOB: 1945  Date of Service: 11/10/2022  MRN: [id number]   Acct: [de-identified]   Primary Care Physician: Shirlene Bonilla MD  Advance Directive: Full Code  Admit Date: 11/8/2022       Hospital Day: 2  Portions of this note have been copied forward, however, changed to reflect the most current clinical status of this patient. CHIEF COMPLAINT chest pain    SUBJECTIVE: No further chest pain. Reports nausea pending Lexiscan. Denies shortness of breath and palpitations. CUMULATIVE HOSPITAL COURSE:  The patient is a 64-year-old female with a past medical history of hypothyroidism, lupus, myasthenia gravis, atrial fibrillation, and MI in early 2022 requiring stent placement who presented to [facility] ED via air EMS with complaints of chest pain and dyspnea. Patient reports MI in January requiring 2 stents. She reports taking Brilinta twice a day since then. Patient reported having nausea associated with her chest pain and took nitro at home. Patient reports pain was relieved with nitroglycerin. In route patient was noted to be hypotensive with and received fluid bolus. Patient was also found to be A. fib RVR and was given Cardizem bolus which converted her back to sinus rhythm. Patient was admitted to the hospitalist service with cardiology consult for further evaluation. Troponin was trended 0.02, 0.06, and 0.06. Patient was evaluated by cardiology and discussed further testing. Lexiscan this AM, results pending. Cardiology also recommends ddimer, which was elevated at 1.06, CTA chest pending. Will need Zio patch at [place]. Hypoglycemia resolved once diet resumed, hypoglycemia protocol in place. Review of Systems:   Review of Systems   Constitutional:  Negative for appetite change, chills and fatigue. HENT:  Negative for sore throat and trouble swallowing. Respiratory:  Negative for chest tightness and shortness of breath.     Cardiovascular: Negative for chest pain, palpitations and leg swelling. Gastrointestinal:  Negative for abdominal pain, constipation, diarrhea, nausea, rectal pain and vomiting. Genitourinary:  Negative for difficulty urinating, frequency and urgency. Musculoskeletal:  Negative for back pain and neck pain. Skin:  Negative for color change and wound. Neurological:  Negative for dizziness, tremors and headaches. Psychiatric/Behavioral:  Negative for agitation and confusion. 14 point review of systems is negative except as specifically addressed above. Objective:   VITALS:  /73 Comment: manual bp  Pulse 65   Temp 97.5 °F (36.4 °C) (Temporal)   Resp 20   Ht 5' 4\" (1.626 m)   Wt 74 lb 9.6 oz (33.8 kg)   SpO2 100%   BMI 12.81 kg/m²   24HR INTAKE/OUTPUT:    Intake/Output Summary (Last 24 hours) at 11/10/2022 1522  Last data filed at 11/10/2022 [id number]  Gross per 24 hour   Intake 0 ml   Output --   Net 0 ml         Physical Exam  Vitals reviewed. Constitutional:       Comments: Very thin and frail   HENT:      Mouth/Throat:      Mouth: Mucous membranes are dry. Pharynx: Oropharynx is clear. Eyes:      Pupils: Pupils are equal, round, and reactive to light. Cardiovascular:      Rate and Rhythm: Normal rate and regular rhythm. Pulses: Normal pulses. Heart sounds: Normal heart sounds. Pulmonary:      Effort: Pulmonary effort is normal.      Breath sounds: Normal breath sounds. Abdominal:      General: Abdomen is flat. Bowel sounds are normal. There is no distension. Palpations: Abdomen is soft. Tenderness: There is no abdominal tenderness. There is no guarding. Musculoskeletal:         General: Normal range of motion. Right lower leg: No edema. Left lower leg: No edema. Skin:     General: Skin is warm and dry. Capillary Refill: Capillary refill takes less than 2 seconds. Neurological:      General: No focal deficit present.       Mental Status: She is alert and oriented to person, place, and time. Medications:      dextrose      dextrose 5% in lactated ringers 100 mL/hr at 11/10/22 1121    sodium chloride        aspirin  81 mg Oral Daily    metoprolol succinate  12.5 mg Oral Daily    levothyroxine  100 mcg Oral Daily    ticagrelor  90 mg Oral BID    sodium chloride flush  5-40 mL IntraVENous 2 times per day    predniSONE  10 mg Oral Daily     glucose, dextrose bolus **OR** dextrose bolus, glucagon (rDNA), dextrose, sodium chloride flush, sodium chloride, ondansetron **OR** ondansetron, acetaminophen **OR** acetaminophen, polyethylene glycol, magnesium sulfate, potassium chloride **OR** potassium alternative oral replacement **OR** potassium chloride  ADULT DIET; Easy to Chew; Low Fat/Low Chol/High Fiber/2 gm Na     Lab and other Data:     Recent Labs     11/08/22  1241 11/09/22  0325 11/10/22  0313   WBC 9.9 6.9 7.8   HGB 12.1 11.7* 11.0*    283 276       Recent Labs     11/08/22  1241 11/09/22  0325 11/10/22  0313   * 135* 133*   K 3.1* 4.2 3.9   CL 94* 98 98   CO2 28 28 26   BUN 14 11 13   CREATININE 0.6 0.4* 0.4*   GLUCOSE 123* 104 163*       Recent Labs     11/08/22  1241 11/09/22  0325 11/10/22  0313   AST 27 26 21   ALT 15 13 11   BILITOT 0.6 0.7 0.8   ALKPHOS 90 83 75       Troponin T:   Recent Labs     11/08/22  1241 11/08/22  1751 11/09/22  0853   TROPONINI 0.02 0.06* 0.06*       Pro-BNP: No results for input(s): BNP in the last 72 hours. INR: No results for input(s): INR in the last 72 hours. UA:  Recent Labs     11/08/22  1533   COLORU YELLOW   PHUR 6.5   CLARITYU Clear   SPECGRAV 1.010   LEUKOCYTESUR Negative   UROBILINOGEN 0.2   BILIRUBINUR Negative   BLOODU Negative   GLUCOSEU Negative       A1C: No results for input(s): LABA1C in the last 72 hours. ABG:No results for input(s): PHART, ICB9JDZ, PO2ART, PVZ1EWU, BEART, HGBAE, D6CDCYHF, CARBOXHGBART in the last 72 hours.     RAD:   XR CHEST PORTABLE    Result Date: 11/8/2022  Right pleural effusion. Postsurgical changes of the thoracolumbar spine. Prosthetic cardiac valve. Recommendation: Follow up as clinically indicated. Electronically Signed by Juhi Alejandro MD at 08-Nov-2022 03:07:46 PM EST                  Assessment/Plan   Principal Problem:    Chest pain / s/p stent   -cardiology on board   -[facility] today, results pending   -troponin 0.02, 0.06, 0.06   -monitor on tele, Zio patch at discharge   -d.  Dimer 1.06, CTA chest per cardiology recommendations   -continue Brilinta   -resume metoprolol   -home health at [place]    Active Problems:  Hypoglycemia   -Monitor Accu-Cheks while n.p.o.   -Hypoglycemia protocol in place   -D5 LR while n.p.o.      Myasthenia gravis ([facility] [address]   -noted      History of mitral valve repair   -noted      Depression   -noted      Severe malnutrition ([address]   -dietician consult      Lupus (systemic lupus erythematosus) ([address]   -continue home prednisone      DVT Prophylaxis:JEREMY Roman - CNP, 11/10/2022 3:22 PM

## 2022-11-10 NOTE — DISCHARGE SUMMARY
Rose Livingston  :  1945  MRN:  [id number]    Admit date:  2022  Discharge date:  11/10/2022    Discharging Physician:  Dr. Teresa Vee Directive: Full Code    Consults: Laurie KEYES CONSULT TO HOME CARE NEEDS     Primary Care Physician:  Ella Taylor MD    Discharge Diagnoses:  Principal Problem:    Chest pain  Active Problems:    Myasthenia gravis ([facility]    History of mitral valve repair    Depression    Severe malnutrition (HCC)    Lupus (systemic lupus erythematosus) ([facility]    Palliative care patient  Resolved Problems:    * No resolved hospital problems. *      Portions of this note have been copied forward, however, changed to reflect the most current clinical status of this patient. Hospital Course: The patient is a 24-year-old female with a past medical history of hypothyroidism, lupus, myasthenia gravis, atrial fibrillation, and MI in early  requiring stent placement who presented to [facility] ED via [facility] with complaints of chest pain and dyspnea. Patient reported MI in January requiring 2 stents,  taking Brilinta twice a day since then. Patient reported having nausea associated with her chest pain and took nitro at home. Patient reported pain was relieved with nitroglycerin. In route patient was noted to be hypotensive with and received fluid bolus. Patient was also found to be A. fib RVR and was given Cardizem bolus which converted her back to sinus rhythm. Patient was admitted to the hospitalist service with cardiology consult for further evaluation. Troponin was trended 0.02, 0.06, and 0.06. Patient was evaluated by cardiology and discussed further testing. Lexiscan this AM, per cardiology, Dr. Ria Hernandez unremarkable, however final read still pending. Cardiology also recommended ddimer, which was elevated at 1.06. Zio patch applied at DC. Has remained sinus since admission. Hypoglycemia resolved once diet resumed.   Patient needs to follow-up with PCP within 1 week and her cardiologist in approximately 2 weeks after completion of 14-day Zio patch for further recommendations. Patient would also benefit from home health care at discharge and patient agreeable. Patient is now medically stable to be discharged home with home health. Significant Diagnostic Studies:   XR CHEST PORTABLE    Result Date: 11/8/2022  NO PRIOR REPORT AVAILABLE [facility] Clinical data:Chest pain. Technique:Single view of the chest. Prior studies: Radiographs of the chest dated 01/08/2022 report. Findings: Crawford rods traverse the thoracolumbar spine. Cage devices overlie the lower thoracic and upper lumbar spine. Aorta is calcified and tortuous. The heart is enlarged. Curvilinear density overlies the projection of the heart suggestive of a prostatic cardiac valve. There is a right pleural effusion. Right pleural effusion. Postsurgical changes of the thoracolumbar spine. Prosthetic cardiac valve. Recommendation: Follow up as clinically indicated. Electronically Signed by Yamila Marie MD at [id number] 03:07:46 PM EST               Pertinent Labs:   CBC:   Recent Labs     11/08/22  1241 11/09/22  0325 11/10/22  0313   WBC 9.9 6.9 7.8   HGB 12.1 11.7* 11.0*    283 276     BMP:    Recent Labs     11/08/22  1241 11/09/22  0325 11/10/22  0313   * 135* 133*   K 3.1* 4.2 3.9   CL 94* 98 98   CO2 28 28 26   BUN 14 11 13   CREATININE 0.6 0.4* 0.4*   GLUCOSE 123* 104 163*     INR: No results for input(s): INR in the last 72 hours. Physical Exam:  Vital Signs: /73 Comment: manual bp  Pulse 65   Temp 97.5 °F (36.4 °C) (Temporal)   Resp 20   Ht 5' 4\" (1.626 m)   Wt 74 lb 9.6 oz (33.8 kg)   SpO2 100%   BMI 12.81 kg/m²   Physical Exam  Vitals reviewed. Constitutional:       Comments: Very thin and frail   HENT:      Mouth/Throat:      Mouth: Mucous membranes are dry. Pharynx: Oropharynx is clear.    Eyes: Pupils: Pupils are equal, round, and reactive to light. Cardiovascular:      Rate and Rhythm: Normal rate and regular rhythm. Pulses: Normal pulses. Heart sounds: Normal heart sounds. Pulmonary:      Effort: Pulmonary effort is normal.      Breath sounds: Normal breath sounds. Abdominal:      General: Abdomen is flat. Bowel sounds are normal. There is no distension. Palpations: Abdomen is soft. Tenderness: There is no abdominal tenderness. There is no guarding. Musculoskeletal:         General: Normal range of motion. Right lower leg: No edema. Left lower leg: No edema. Skin:     General: Skin is warm and dry. Capillary Refill: Capillary refill takes less than 2 seconds. Neurological:      General: No focal deficit present. Mental Status: She is alert and oriented to person, place, and time. Discharge Medications:         Medication List        CHANGE how you take these medications      aspirin 81 MG chewable tablet  Take 1 tablet by mouth daily  What changed: additional instructions            CONTINUE taking these medications      metoprolol succinate 25 MG extended release tablet  Commonly known as: TOPROL XL  Take 0.5 tablets by mouth daily     predniSONE 10 MG tablet  Commonly known as: DELTASONE     Synthroid 25 MCG tablet  Generic drug: levothyroxine     ticagrelor 90 MG Tabs tablet  Commonly known as: Brilinta  Take 1 tablet by mouth 2 times daily            STOP taking these medications      vitamin D 50 MCG (2000 UT) Tabs tablet  Commonly known as: CHOLECALCIFEROL              Discharge Instructions: Follow up with Lalo Arellano MD in 3-5 days. Take medications as directed. Resume activity as tolerated. Diet: ADULT DIET; Easy to Chew; Low Fat/Low Chol/High Fiber/2 gm Na     Disposition: Patient is Stableand will be discharged to Home with [address] [place] [address].     Time spent on discharge 31 minutes spent in assessing patient, reviewing medications, discussion with nursing, confirming safe discharge plan and preparation of discharge summary.     Signed:  JEREMY Mera - CNP, 11/10/2022 4:03 PM

## 2022-11-10 NOTE — CARE COORDINATION
Spoke with patient regarding MD orders for [facility] services. Pt agreeable and chose [facility] . Spoke with [facility]. Referral accepted and faxed. Please notify [facility] when patient discharges and Fax DC Summary,  DC med list and any new [facility] orders. P. [phone]  F. [phone]  The Patient and/or patient representative was provided with a choice of provider and agrees   with the discharge plan. [x] Yes [] No    Freedom of choice list was provided with basic dialogue that supports the patient's individualized plan of care/goals, treatment preferences and shares the quality data associated with the providers.  [x] Yes [] No  Electronically signed by Debbie Lee on 11/10/22 at 3:55 PM CST

## 2022-11-10 NOTE — PLAN OF CARE
Problem: Safety - Adult  Goal: Free from fall injury  Outcome: Completed     Problem: ABCDS Injury Assessment  Goal: Absence of physical injury  Outcome: Completed     Problem: Nutrition Deficit:  Goal: Optimize nutritional status  Outcome: Completed

## 2022-11-11 LAB
LV EF: 59 %
LVEF MODALITY: NORMAL

## 2022-11-11 PROCEDURE — 78452 HT MUSCLE IMAGE SPECT MULT: CPT | Performed by: INTERNAL MEDICINE

## 2022-11-13 LAB
BLOOD CULTURE, ROUTINE: NORMAL
CULTURE, BLOOD 2: NORMAL

## 2022-11-21 NOTE — PROGRESS NOTES
Physician Progress Note      Shiela Daniel  [facility] #:                  [id number]  :                       1945  ADMIT DATE:       2022 12:29 PM  [address] DATE:        11/10/2022 9:40 AM  RESPONDING  PROVIDER #:        Jailyn Escalona          QUERY TEXT:    Pt admitted with chest pain. ER Provider notes reflect A. Fib and pleural   effusion. Cardiology consult notes, \"Onset yesterday headache nausea jaw pain   and chest pain relieved with nitroglycerin troponin -2 troponins 0.06 and 0.06   unstable angina not excluded. \" If possible, please document in progress notes   and discharge summary if you are evaluating and/or treating any of the   following: The medical record reflects the following:  Risk Factors: CAD, PAF, Pleural effusion  Clinical Indicators: CXR right pleural effusion. ER EKG atrial fib with RVR   rate of 149. Headache, nausea, jaw pain, and chest pain, unstable angina not   excluded. Troponin 0.02 0.06 0.06. Lexiscan, Normal ejection fraction 59%   normal perfusion study without evidence of ischemia or prior infarction. Treatment: EKG, CXR, Lexiscan, Cardiology profile, Cardiology consult. Cardizem bolus 10 mg IV. Telemetry. Options provided:  -- Chest pain due to CAD with unstable angina  -- Chest pain due to CAD  -- Chest pain due to pleural effusion  -- Chest pain due to PAF  -- Other - I will add my own diagnosis  -- Disagree - Not applicable / Not valid  -- Disagree - Clinically unable to determine / Unknown  -- Refer to Clinical Documentation Reviewer    PROVIDER RESPONSE TEXT:    This patient has chest pain due to CAD.     Query created by: Araceil Schofield on 2022 12:36 PM      Electronically signed by:  Jailyn Escalona 2022 5:54 PM

## 2022-12-30 NOTE — PROCEDURES
Cardiac event monitor report    Dates of recording 11/10/2022 through 11/24/2022    Summary impressions:    Sinus rhythm minimal heart rate 53/78 maximal 157    2. 298 episode supraventricular tachycardia fastest interval lasting 7 minutes 3 seconds maximum rate 214 average 178 also the longest    3. No episodes of ventricular tachycardia were recorded    4. No pauses 3.0 seconds or longer were recorded    5. No episodes of complete or Mobitz 2 atrioventricular block were recorded    6. No episodes of atrial fibrillation were recorded    7. 1 triggered events to diary events rhythm during those recording supraventricular tachycardia    8.  Frequent PACs isolated 9.6% couplets 3.0% and triplets 1.0% otherwise rare ectopy

## (undated) DEVICE — SURGICAL PROCEDURE PACK LOWER EXTREMITY LOURDES HOSP

## (undated) DEVICE — SUTURE VCRL SZ 3-0 L27IN ABSRB UD L26MM SH 1/2 CIR J416H

## (undated) DEVICE — DRESSING PETRO W3XL3IN OIL EMUL N ADH GZ KNIT IMPREG CELOS

## (undated) DEVICE — SUTURE ETHLN SZ 3-0 L18IN NONABSORBABLE BLK FS-1 L24MM 3/8 663H

## (undated) DEVICE — IMMOBILIZER SLING: Brand: DEROYAL

## (undated) DEVICE — GLOVE SURG SZ 75 L12IN FNGR THK13MIL BRN LTX SYN POLYMER W

## (undated) DEVICE — ST. TRACTION CORD: Brand: DEROYAL

## (undated) DEVICE — SOLUTION IV IRRIG POUR BRL 0.9% SODIUM CHL 2F7124

## (undated) DEVICE — ZIMMER® STERILE DISPOSABLE TOURNIQUET CUFF WITH PLC, DUAL PORT, SINGLE BLADDER, 18 IN. (46 CM)

## (undated) DEVICE — BANDAGE ACE 4X5YDNS

## (undated) DEVICE — VESSEL LOOPS,MINI, WHITE: Brand: DEVON

## (undated) DEVICE — GLOVE SURG SZ 8 CRM LTX FREE POLYISOPRENE POLYMER BEAD ANTI

## (undated) DEVICE — C-ARM: Brand: UNBRANDED

## (undated) DEVICE — NEEDLE ELECTRODE MONO 2.4FR WL 260MM  FOR CYSTOSCOPY, ELECTRODE SHAPE: NEEDLE, PACK=1 PC, REUSABLE